# Patient Record
Sex: MALE | Race: BLACK OR AFRICAN AMERICAN | Employment: OTHER | URBAN - METROPOLITAN AREA
[De-identification: names, ages, dates, MRNs, and addresses within clinical notes are randomized per-mention and may not be internally consistent; named-entity substitution may affect disease eponyms.]

---

## 2019-04-23 ENCOUNTER — APPOINTMENT (OUTPATIENT)
Dept: GENERAL RADIOLOGY | Age: 59
End: 2019-04-23
Payer: COMMERCIAL

## 2019-04-23 ENCOUNTER — HOSPITAL ENCOUNTER (EMERGENCY)
Age: 59
Discharge: HOME | End: 2019-04-23
Payer: COMMERCIAL

## 2019-04-23 VITALS
TEMPERATURE: 99.1 F | HEART RATE: 76 BPM | SYSTOLIC BLOOD PRESSURE: 151 MMHG | OXYGEN SATURATION: 99 % | RESPIRATION RATE: 16 BRPM | DIASTOLIC BLOOD PRESSURE: 89 MMHG

## 2019-04-23 DIAGNOSIS — S93.402A SPRAIN OF LEFT ANKLE, UNSPECIFIED LIGAMENT, INITIAL ENCOUNTER: ICD-10-CM

## 2019-04-23 DIAGNOSIS — S92.415A CLOSED NONDISPLACED FRACTURE OF PROXIMAL PHALANX OF LEFT GREAT TOE, INITIAL ENCOUNTER: Primary | ICD-10-CM

## 2019-04-23 PROCEDURE — 99283 EMERGENCY DEPT VISIT LOW MDM: CPT

## 2019-04-23 PROCEDURE — 6370000000 HC RX 637 (ALT 250 FOR IP): Performed by: PHYSICIAN ASSISTANT

## 2019-04-23 PROCEDURE — 73610 X-RAY EXAM OF ANKLE: CPT

## 2019-04-23 PROCEDURE — 73630 X-RAY EXAM OF FOOT: CPT

## 2019-04-23 RX ORDER — OXYCODONE HYDROCHLORIDE AND ACETAMINOPHEN 5; 325 MG/1; MG/1
1 TABLET ORAL ONCE
Status: COMPLETED | OUTPATIENT
Start: 2019-04-23 | End: 2019-04-23

## 2019-04-23 RX ORDER — NAPROXEN 500 MG/1
500 TABLET ORAL 2 TIMES DAILY WITH MEALS
Qty: 20 TABLET | Refills: 0 | Status: SHIPPED | OUTPATIENT
Start: 2019-04-23 | End: 2020-06-04

## 2019-04-23 RX ORDER — HYDROCODONE BITARTRATE AND ACETAMINOPHEN 5; 325 MG/1; MG/1
1 TABLET ORAL EVERY 6 HOURS PRN
Qty: 10 TABLET | Refills: 0 | Status: SHIPPED | OUTPATIENT
Start: 2019-04-23 | End: 2019-04-26

## 2019-04-23 RX ADMIN — OXYCODONE AND ACETAMINOPHEN 1 TABLET: 5; 325 TABLET ORAL at 15:27

## 2019-04-23 SDOH — HEALTH STABILITY: MENTAL HEALTH: HOW OFTEN DO YOU HAVE A DRINK CONTAINING ALCOHOL?: NEVER

## 2019-04-23 ASSESSMENT — PAIN SCALES - GENERAL
PAINLEVEL_OUTOF10: 8
PAINLEVEL_OUTOF10: 8

## 2019-04-23 ASSESSMENT — ENCOUNTER SYMPTOMS
EYE REDNESS: 0
EYE DISCHARGE: 0
APNEA: 0
NAUSEA: 0
ABDOMINAL PAIN: 0
CHOKING: 0
FACIAL SWELLING: 0
SHORTNESS OF BREATH: 0
BACK PAIN: 0
VOMITING: 0

## 2019-04-23 ASSESSMENT — PAIN DESCRIPTION - PROGRESSION: CLINICAL_PROGRESSION: GRADUALLY WORSENING

## 2019-04-23 ASSESSMENT — PAIN DESCRIPTION - DESCRIPTORS: DESCRIPTORS: THROBBING

## 2019-04-23 ASSESSMENT — PAIN DESCRIPTION - LOCATION: LOCATION: TOE (COMMENT WHICH ONE)

## 2019-04-23 ASSESSMENT — PAIN DESCRIPTION - ORIENTATION: ORIENTATION: LEFT

## 2019-04-23 ASSESSMENT — PAIN DESCRIPTION - PAIN TYPE: TYPE: ACUTE PAIN

## 2019-04-23 ASSESSMENT — PAIN DESCRIPTION - FREQUENCY: FREQUENCY: CONTINUOUS

## 2019-04-23 ASSESSMENT — PAIN - FUNCTIONAL ASSESSMENT: PAIN_FUNCTIONAL_ASSESSMENT: PREVENTS OR INTERFERES WITH MANY ACTIVE NOT PASSIVE ACTIVITIES

## 2019-04-23 ASSESSMENT — PAIN DESCRIPTION - ONSET: ONSET: SUDDEN

## 2019-04-23 NOTE — LETTER
[facility] Emergency Department  [address] [place] [id number]  Phone: [phone]             April 23, 2019    Patient: Tamika Lloyd Sr. YOB: 1960   Date of Visit: 4/23/2019       To Whom It May Concern:    Tamika Lloyd was seen and treated in our emergency department on 4/23/2019. He may return to work on 4/29/19.       Sincerely,             Signature:__________________________________

## 2019-04-23 NOTE — ED PROVIDER NOTES
**EVALUATED BY ADVANCED PRACTICE PROVIDER**        [facility]  eMERGENCY dEPARTMENT eNCOUnter      Pt Name: Hector Graham Sr. XJI:[id number]  Birthdate 1960  Date of evaluation: 4/23/2019  Provider: Ronit Wynn PA-C      Chief Complaint:    Chief Complaint   Patient presents with    Foot Injury     pt reports he fell on the steps on sunday chasing the grandkids. denies head injury. left foot and toes are bruised and swollen. Nursing Notes, Past Medical Hx, Past Surgical Hx, Social Hx, Allergies, and Family Hx were all reviewed and agreed with or any disagreements were addressed in the HPI.    HPI:  (Location, Duration, Timing, Severity,Quality, Assoc Sx, Context, Modifying factors)  This is a  61 y.o. male who complain of injury to his left ankle and foot. Patient stated on Sunday he fell down steps and hit his left foot on the step. Discussed swelling noted around the great toe and the lateral side of the left foot and ankle. Denies numbness or tingling. Can barely put any weight on it has been walking on his heel. Denies knee pain, no hip pain, no other extremity injury. No loss of consciousness. Denies chest pain, no abdominal pain. No other complaints. PastMedical/Surgical History:  History reviewed. No pertinent past medical history. History reviewed. No pertinent surgical history. Medications:  Previous Medications    No medications on file         Review of Systems:  Review of Systems   Constitutional: Negative for chills and fever. HENT: Negative for congestion, facial swelling and sneezing. Eyes: Negative for discharge and redness. Respiratory: Negative for apnea, choking and shortness of breath. Cardiovascular: Negative for chest pain. Gastrointestinal: Negative for abdominal pain, nausea and vomiting. Genitourinary: Negative for dysuria. Musculoskeletal: Negative for back pain, neck pain and neck stiffness. Neurological: Negative for dizziness, tremors, seizures and headaches. All other systems reviewed and are negative. Positives and Pertinent negatives as per HPI. Except as noted above in the ROS, problem specific ROS was completed and is negative. Physical Exam:  Physical Exam   Constitutional: He is oriented to person, place, and time. He appears well-developed and well-nourished. HENT:   Head: Normocephalic and atraumatic. Nose: Nose normal.   Eyes: Right eye exhibits no discharge. Left eye exhibits no discharge. Neck: Normal range of motion. Neck supple. No spinous process tenderness and no muscular tenderness present. Cardiovascular: Normal rate, regular rhythm and normal heart sounds. Exam reveals no gallop and no friction rub. No murmur heard. Pulmonary/Chest: Effort normal and breath sounds normal. No respiratory distress. He has no wheezes. He has no rales. He exhibits no tenderness. Musculoskeletal:        Left ankle: He exhibits decreased range of motion and swelling. He exhibits no deformity. Tenderness. Lateral malleolus, medial malleolus and head of 5th metatarsal tenderness found. No proximal fibula tenderness found. Achilles tendon normal.        Feet:    Neurological: He is alert and oriented to person, place, and time. Skin: Skin is warm and dry. He is not diaphoretic. Psychiatric: He has a normal mood and affect. His behavior is normal.   Nursing note and vitals reviewed. MEDICAL DECISION MAKING    Vitals:    Vitals:    04/23/19 1259   BP: (!) 151/89   Pulse: 76   Resp: 16   Temp: 99.1 °F (37.3 °C)   SpO2: 99%       LABS:Labs Reviewed - No data to display     Remainder of labs reviewed and werenegative at this time or not returned at the time of this note.     RADIOLOGY:   Non-plain film images such as CT, Ultrasound and MRI are read by the radiologist. Bruno Haile PA-C have directly visualized the radiologic plain film image(s) with the below findings:        Interpretation per the Radiologist below, if available at the time of thisnote:    XR FOOT LEFT (MIN 3 VIEWS)   Final Result   On the AP view, there is a subtle lucency through the shaft of the proximal   phalanx of the great toe concerning for nondisplaced fracture. 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 There is associated soft tissue swelling. The degenerative changes involve   the joint somewhat more pronounced laterally. If there are clinical signs   and/or symptoms of infection, a septic joint with bony destruction is not   entirely excluded and should be considered. In that event, MRI could be   performed for further evaluation. XR ANKLE LEFT (MIN 3 VIEWS)   Final Result   On the AP view, there is a subtle lucency through the shaft of the proximal   phalanx of the great toe concerning for nondisplaced fracture. 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 There is associated soft tissue swelling. The degenerative changes involve   the joint somewhat more pronounced laterally. If there are clinical signs   and/or symptoms of infection, a septic joint with bony destruction is not   entirely excluded and should be considered. In that event, MRI could be   performed for further evaluation. No results found. MEDICAL DECISION MAKING / ED COURSE:      PROCEDURES:   Procedures    None    Patient was given:     Medications   oxyCODONE-acetaminophen (PERCOCET) 5-325 MG per tablet 1 tablet (has no administration in time range)         Emergency room course: Patient on exam shows mild tenderness along the lateral aspect of the fifth metatarsal particular clear the base. Mild tenderness to the ankle medial lateral side with no obvious deformities slight swelling noted. Swelling and tenderness noted over the great toe to clean the head of the first metatarsal extending through the toe.  Pedal pulse, 2+ capillary refill less than 2 seconds all digits. Plantar surface of foot show no tenderness Achilles so no tenderness. Pedal pulse good at 2+      X-ray of the left ankle left foot shows lucency through the shaft of the proximal phalanx of the great toe. Concern for nondisplaced fracture. At this time I did discuss patient discharge plan with him. I'll give him Percocet 5 mg by mouth here in emergency room. Put him on an anti-inflammatory and a few Norco for home. He is to ice elevate. Follow orthopedics and return if worsens. He understood discharge plan he'll be discharged stable condition. The patient tolerated their visit well. I evaluated the patient. The physician was available for consultation as needed. The patient and / or the family were informed of the results of anytests, a time was given to answer questions, a plan was proposed and they agreed with plan. CLINICAL IMPRESSION:  1. Closed nondisplaced fracture of proximal phalanx of left great toe, initial encounter    2. Sprain of left ankle, unspecified ligament, initial encounter        DISPOSITION  DISPOSITION Decision To Discharge 04/23/2019 03:14:41 PM          PATIENT REFERRED TO:  Anne Celis [address], [address]  [phone]    Call in 1 week        DISCHARGE MEDICATIONS:  New Prescriptions    HYDROCODONE-ACETAMINOPHEN (NORCO) 5-325 MG PER TABLET    Take 1 tablet by mouth every 6 hours as needed for Pain for up to 3 days.     NAPROXEN (NAPROSYN) 500 MG TABLET    Take 1 tablet by mouth 2 times daily (with meals)       DISCONTINUED MEDICATIONS:  Discontinued Medications    No medications on file              (Please note the MDM and HPI sections of this note were completed with a voice recognition program.  Efforts weremade to edit the dictations but occasionally words are mis-transcribed.)    Electronically signed, Jackson Goldberg, PA-C,          Jackson Goldberg, PA-C  04/29/19 0658

## 2020-06-04 ENCOUNTER — APPOINTMENT (OUTPATIENT)
Dept: GENERAL RADIOLOGY | Age: 60
DRG: 313 | End: 2020-06-04
Payer: COMMERCIAL

## 2020-06-04 ENCOUNTER — HOSPITAL ENCOUNTER (INPATIENT)
Age: 60
LOS: 1 days | Discharge: HOME | DRG: 313 | End: 2020-06-05
Attending: EMERGENCY MEDICINE | Admitting: FAMILY MEDICINE
Payer: COMMERCIAL

## 2020-06-04 ENCOUNTER — APPOINTMENT (OUTPATIENT)
Dept: CT IMAGING | Age: 60
DRG: 313 | End: 2020-06-04
Payer: COMMERCIAL

## 2020-06-04 PROBLEM — E04.9 GOITER: Status: ACTIVE | Noted: 2020-06-04

## 2020-06-04 LAB
ANION GAP SERPL CALCULATED.3IONS-SCNC: 13 MMOL/L (ref 3–16)
APTT: 33.4 SEC (ref 24.2–36.2)
BASOPHILS ABSOLUTE: 0 K/UL (ref 0–0.2)
BASOPHILS RELATIVE PERCENT: 0 %
BUN BLDV-MCNC: 7 MG/DL (ref 7–20)
CALCIUM SERPL-MCNC: 8.6 MG/DL (ref 8.3–10.6)
CHLORIDE BLD-SCNC: 104 MMOL/L (ref 99–110)
CO2: 22 MMOL/L (ref 21–32)
CREAT SERPL-MCNC: 0.8 MG/DL (ref 0.8–1.3)
EKG ATRIAL RATE: 67 BPM
EKG ATRIAL RATE: 74 BPM
EKG DIAGNOSIS: NORMAL
EKG DIAGNOSIS: NORMAL
EKG P AXIS: 71 DEGREES
EKG P AXIS: 74 DEGREES
EKG P-R INTERVAL: 132 MS
EKG P-R INTERVAL: 134 MS
EKG Q-T INTERVAL: 416 MS
EKG Q-T INTERVAL: 422 MS
EKG QRS DURATION: 82 MS
EKG QRS DURATION: 98 MS
EKG QTC CALCULATION (BAZETT): 445 MS
EKG QTC CALCULATION (BAZETT): 461 MS
EKG R AXIS: 23 DEGREES
EKG R AXIS: 5 DEGREES
EKG T AXIS: 62 DEGREES
EKG T AXIS: 76 DEGREES
EKG VENTRICULAR RATE: 67 BPM
EKG VENTRICULAR RATE: 74 BPM
EOSINOPHILS ABSOLUTE: 0.3 K/UL (ref 0–0.6)
EOSINOPHILS RELATIVE PERCENT: 10 %
GFR AFRICAN AMERICAN: >60
GFR NON-AFRICAN AMERICAN: >60
GLUCOSE BLD-MCNC: 112 MG/DL (ref 70–99)
HCT VFR BLD CALC: 39.3 % (ref 40.5–52.5)
HEMOGLOBIN: 13.5 G/DL (ref 13.5–17.5)
INR BLD: 0.95 (ref 0.86–1.14)
LYMPHOCYTES ABSOLUTE: 1.3 K/UL (ref 1–5.1)
LYMPHOCYTES RELATIVE PERCENT: 39 %
MACROCYTES: ABNORMAL
MAGNESIUM: 1.9 MG/DL (ref 1.8–2.4)
MCH RBC QN AUTO: 34.5 PG (ref 26–34)
MCHC RBC AUTO-ENTMCNC: 34.3 G/DL (ref 31–36)
MCV RBC AUTO: 100.4 FL (ref 80–100)
MONOCYTES ABSOLUTE: 0.3 K/UL (ref 0–1.3)
MONOCYTES RELATIVE PERCENT: 10 %
NEUTROPHILS ABSOLUTE: 1.4 K/UL (ref 1.7–7.7)
NEUTROPHILS RELATIVE PERCENT: 41 %
PDW BLD-RTO: 14.4 % (ref 12.4–15.4)
PLATELET # BLD: 244 K/UL (ref 135–450)
PLATELET SLIDE REVIEW: ADEQUATE
PMV BLD AUTO: 7.5 FL (ref 5–10.5)
POTASSIUM REFLEX MAGNESIUM: 3.5 MMOL/L (ref 3.5–5.1)
PRO-BNP: 9 PG/ML (ref 0–124)
PROTHROMBIN TIME: 11 SEC (ref 10–13.2)
RBC # BLD: 3.91 M/UL (ref 4.2–5.9)
SLIDE REVIEW: ABNORMAL
SODIUM BLD-SCNC: 139 MMOL/L (ref 136–145)
TOTAL CK: 500 U/L (ref 39–308)
TROPONIN: 0.02 NG/ML
TROPONIN: <0.01 NG/ML
TSH REFLEX: 0.78 UIU/ML (ref 0.27–4.2)
WBC # BLD: 3.3 K/UL (ref 4–11)

## 2020-06-04 PROCEDURE — 99255 IP/OBS CONSLTJ NEW/EST HI 80: CPT | Performed by: INTERNAL MEDICINE

## 2020-06-04 PROCEDURE — 93010 ELECTROCARDIOGRAM REPORT: CPT | Performed by: INTERNAL MEDICINE

## 2020-06-04 PROCEDURE — 6370000000 HC RX 637 (ALT 250 FOR IP): Performed by: INTERNAL MEDICINE

## 2020-06-04 PROCEDURE — 85610 PROTHROMBIN TIME: CPT

## 2020-06-04 PROCEDURE — 99253 IP/OBS CNSLTJ NEW/EST LOW 45: CPT | Performed by: SURGERY

## 2020-06-04 PROCEDURE — 80048 BASIC METABOLIC PNL TOTAL CA: CPT

## 2020-06-04 PROCEDURE — 71045 X-RAY EXAM CHEST 1 VIEW: CPT

## 2020-06-04 PROCEDURE — 2580000003 HC RX 258: Performed by: FAMILY MEDICINE

## 2020-06-04 PROCEDURE — 36415 COLL VENOUS BLD VENIPUNCTURE: CPT

## 2020-06-04 PROCEDURE — 85025 COMPLETE CBC W/AUTO DIFF WBC: CPT

## 2020-06-04 PROCEDURE — 85730 THROMBOPLASTIN TIME PARTIAL: CPT

## 2020-06-04 PROCEDURE — 93005 ELECTROCARDIOGRAM TRACING: CPT | Performed by: NURSE PRACTITIONER

## 2020-06-04 PROCEDURE — 82550 ASSAY OF CK (CPK): CPT

## 2020-06-04 PROCEDURE — 73590 X-RAY EXAM OF LOWER LEG: CPT

## 2020-06-04 PROCEDURE — 93971 EXTREMITY STUDY: CPT

## 2020-06-04 PROCEDURE — 93005 ELECTROCARDIOGRAM TRACING: CPT | Performed by: EMERGENCY MEDICINE

## 2020-06-04 PROCEDURE — 99285 EMERGENCY DEPT VISIT HI MDM: CPT

## 2020-06-04 PROCEDURE — 83880 ASSAY OF NATRIURETIC PEPTIDE: CPT

## 2020-06-04 PROCEDURE — 6370000000 HC RX 637 (ALT 250 FOR IP): Performed by: FAMILY MEDICINE

## 2020-06-04 PROCEDURE — 83735 ASSAY OF MAGNESIUM: CPT

## 2020-06-04 PROCEDURE — 84443 ASSAY THYROID STIM HORMONE: CPT

## 2020-06-04 PROCEDURE — 71250 CT THORAX DX C-: CPT

## 2020-06-04 PROCEDURE — 2060000000 HC ICU INTERMEDIATE R&B

## 2020-06-04 PROCEDURE — 84484 ASSAY OF TROPONIN QUANT: CPT

## 2020-06-04 PROCEDURE — 73610 X-RAY EXAM OF ANKLE: CPT

## 2020-06-04 RX ORDER — POLYETHYLENE GLYCOL 3350 17 G/17G
17 POWDER, FOR SOLUTION ORAL DAILY PRN
Status: DISCONTINUED | OUTPATIENT
Start: 2020-06-04 | End: 2020-06-05 | Stop reason: HOSPADM

## 2020-06-04 RX ORDER — AMLODIPINE BESYLATE 5 MG/1
5 TABLET ORAL DAILY
Status: DISCONTINUED | OUTPATIENT
Start: 2020-06-04 | End: 2020-06-05

## 2020-06-04 RX ORDER — ACETAMINOPHEN 325 MG/1
650 TABLET ORAL EVERY 6 HOURS PRN
Status: DISCONTINUED | OUTPATIENT
Start: 2020-06-04 | End: 2020-06-05 | Stop reason: HOSPADM

## 2020-06-04 RX ORDER — SODIUM CHLORIDE 0.9 % (FLUSH) 0.9 %
10 SYRINGE (ML) INJECTION PRN
Status: DISCONTINUED | OUTPATIENT
Start: 2020-06-04 | End: 2020-06-05 | Stop reason: HOSPADM

## 2020-06-04 RX ORDER — ACETAMINOPHEN 650 MG/1
650 SUPPOSITORY RECTAL EVERY 6 HOURS PRN
Status: DISCONTINUED | OUTPATIENT
Start: 2020-06-04 | End: 2020-06-05 | Stop reason: HOSPADM

## 2020-06-04 RX ORDER — SODIUM CHLORIDE 0.9 % (FLUSH) 0.9 %
10 SYRINGE (ML) INJECTION EVERY 12 HOURS SCHEDULED
Status: DISCONTINUED | OUTPATIENT
Start: 2020-06-04 | End: 2020-06-05 | Stop reason: HOSPADM

## 2020-06-04 RX ORDER — ONDANSETRON 2 MG/ML
4 INJECTION INTRAMUSCULAR; INTRAVENOUS EVERY 6 HOURS PRN
Status: DISCONTINUED | OUTPATIENT
Start: 2020-06-04 | End: 2020-06-05 | Stop reason: HOSPADM

## 2020-06-04 RX ORDER — PROMETHAZINE HYDROCHLORIDE 25 MG/1
12.5 TABLET ORAL EVERY 6 HOURS PRN
Status: DISCONTINUED | OUTPATIENT
Start: 2020-06-04 | End: 2020-06-05 | Stop reason: HOSPADM

## 2020-06-04 RX ADMIN — SODIUM CHLORIDE, PRESERVATIVE FREE 10 ML: 5 INJECTION INTRAVENOUS at 20:10

## 2020-06-04 RX ADMIN — ACETAMINOPHEN 650 MG: 325 TABLET ORAL at 16:46

## 2020-06-04 RX ADMIN — AMLODIPINE BESYLATE 5 MG: 5 TABLET ORAL at 16:46

## 2020-06-04 ASSESSMENT — PAIN DESCRIPTION - FREQUENCY
FREQUENCY: CONTINUOUS
FREQUENCY: CONTINUOUS

## 2020-06-04 ASSESSMENT — ENCOUNTER SYMPTOMS
CHEST TIGHTNESS: 0
COUGH: 0
ABDOMINAL PAIN: 0
DIARRHEA: 0
SHORTNESS OF BREATH: 1
VOMITING: 0
NAUSEA: 0
COLOR CHANGE: 0

## 2020-06-04 ASSESSMENT — PAIN DESCRIPTION - DESCRIPTORS
DESCRIPTORS: THROBBING
DESCRIPTORS: THROBBING

## 2020-06-04 ASSESSMENT — PAIN SCALES - GENERAL
PAINLEVEL_OUTOF10: 5
PAINLEVEL_OUTOF10: 8
PAINLEVEL_OUTOF10: 0
PAINLEVEL_OUTOF10: 8

## 2020-06-04 ASSESSMENT — PAIN - FUNCTIONAL ASSESSMENT
PAIN_FUNCTIONAL_ASSESSMENT: ACTIVITIES ARE NOT PREVENTED
PAIN_FUNCTIONAL_ASSESSMENT: ACTIVITIES ARE NOT PREVENTED

## 2020-06-04 ASSESSMENT — PAIN DESCRIPTION - ONSET
ONSET: PROGRESSIVE
ONSET: PROGRESSIVE

## 2020-06-04 ASSESSMENT — PAIN DESCRIPTION - PROGRESSION
CLINICAL_PROGRESSION: GRADUALLY WORSENING
CLINICAL_PROGRESSION: GRADUALLY WORSENING

## 2020-06-04 ASSESSMENT — PAIN DESCRIPTION - LOCATION
LOCATION: LEG
LOCATION: LEG

## 2020-06-04 ASSESSMENT — PAIN DESCRIPTION - ORIENTATION
ORIENTATION: LEFT
ORIENTATION: LEFT

## 2020-06-04 ASSESSMENT — HEART SCORE: ECG: 0

## 2020-06-04 ASSESSMENT — PAIN DESCRIPTION - PAIN TYPE
TYPE: ACUTE PAIN
TYPE: ACUTE PAIN

## 2020-06-04 NOTE — ED TRIAGE NOTES
Pt arrived to ED via private vehicle with complaints of left leg swelling. On initial assessment, pt states it started 2 days ago associated with left calf pain that is throbbing. Pt denies injury. VS noted and stable. Patient A&Ox4. Respirations easy and unlabored. Skin warm and dry and appropriate for ethnicity. No acute distress noted at this time.

## 2020-06-04 NOTE — ED PROVIDER NOTES
dysuria. Musculoskeletal:        Left lower extremity pain     Skin: Negative. Negative for color change, pallor, rash and wound. Allergic/Immunologic: Negative for immunocompromised state. Neurological: Negative. Psychiatric/Behavioral: Negative. All other systems reviewed and are negative. Positives and Pertinent negatives as per HPI. Except as noted above in the ROS, all other systems were reviewed and negative. PAST MEDICAL HISTORY   History reviewed. No pertinent past medical history. SURGICAL HISTORY   History reviewed. No pertinent surgical history. CURRENTMEDICATIONS       Previous Medications    No medications on file         ALLERGIES     Patient has no known allergies. FAMILYHISTORY     History reviewed. No pertinent family history. SOCIAL HISTORY       Social History     Tobacco Use    Smoking status: Current Every Day Smoker     Packs/day: 0.50     Types: Cigarettes    Smokeless tobacco: Never Used   Substance Use Topics    Alcohol use: Never     Frequency: Never    Drug use: Never       SCREENINGS             PHYSICAL EXAM    (up to 7 for level 4, 8 or more for level 5)     ED Triage Vitals [06/04/20 0916]   BP Temp Temp Source Pulse Resp SpO2 Height Weight   (!) 157/75 97.8 °F (36.6 °C) Oral 85 15 97 % 5' 7\" (1.702 m) 153 lb 14.1 oz (69.8 kg)       Physical Exam  Vitals signs and nursing note reviewed. Constitutional:       General: He is not in acute distress. Appearance: Normal appearance. He is well-developed. He is not ill-appearing, toxic-appearing or diaphoretic. Interventions: He is not intubated. HENT:      Head: Normocephalic and atraumatic. Eyes:      General: Lids are normal.         Right eye: No discharge. Left eye: No discharge. Conjunctiva/sclera: Conjunctivae normal.   Neck:      Musculoskeletal: Full passive range of motion without pain, normal range of motion and neck supple. Vascular: No JVD. resulting in tracheal   displacement to the right. 3. Severe coronary artery disease. XR TIBIA FIBULA LEFT (2 VIEWS)   Final Result   No acute osseous abnormality of the left tibia/fibula or left ankle. XR CHEST PORTABLE   Final Result   1. No acute cardiopulmonary disease. 2. Tracheal deviation to the right. This is nonspecific, although, a   superior mediastinal mass/lymphadenopathy or substernal goiter are   considerations. Further evaluation with chest CT is recommended. XR ANKLE LEFT (MIN 3 VIEWS)   Final Result   No acute osseous abnormality of the left tibia/fibula or left ankle. Xr Tibia Fibula Left (2 Views)    Result Date: 6/4/2020  EXAMINATION: 2 XRAY VIEWS OF THE LEFT TIBIA AND FIBULA; THREE XRAY VIEWS OF THE LEFT ANKLE 6/4/2020 9:27 am COMPARISON: 04/23/2019 HISTORY: ORDERING SYSTEM PROVIDED HISTORY: pain TECHNOLOGIST PROVIDED HISTORY: Reason for exam:->pain Reason for Exam: leg swelling Acuity: Acute Type of Exam: Initial FINDINGS: No acute fracture or dislocation is demonstrated. The ankle mortise appears intact. There is soft tissue swelling of the left ankle. No acute osseous abnormality of the left tibia/fibula or left ankle. Xr Ankle Left (min 3 Views)    Result Date: 6/4/2020  EXAMINATION: 2 XRAY VIEWS OF THE LEFT TIBIA AND FIBULA; THREE XRAY VIEWS OF THE LEFT ANKLE 6/4/2020 9:27 am COMPARISON: 04/23/2019 HISTORY: ORDERING SYSTEM PROVIDED HISTORY: pain TECHNOLOGIST PROVIDED HISTORY: Reason for exam:->pain Reason for Exam: leg swelling Acuity: Acute Type of Exam: Initial FINDINGS: No acute fracture or dislocation is demonstrated. The ankle mortise appears intact. There is soft tissue swelling of the left ankle. No acute osseous abnormality of the left tibia/fibula or left ankle.      Ct Chest Wo Contrast    Result Date: 6/4/2020  EXAMINATION: CT OF THE CHEST WITHOUT CONTRAST 6/4/2020 10:09 am TECHNIQUE: CT of the chest was performed without display    MDM: Patient seen and evaluated per myself in conjunction with ED attending Dr. Romi Hooks. See HPI and above for full presentation and physical exam.  Patient is a very pleasant 80-year-old male that presents the ED today with not only left lower extremity edema distal to the left knee but also chest pain and shortness of breath with exertion. See HPI and above for full presentation physical exam.  He is afebrile and hemodynamically stable nontoxic in appearance. No acute respiratory distress. Differential diagnoses include dependent edema, lymphedema, compartment syndrome, musculoskeletal injury, ligamental injury, bony fracture, DVT, PE, ACS, arrhythmia, right heart strain, other    Patient's white blood cell count 3.3. No anemia. No severe electrolyte derangements or NICOLE. No coagulopathy. No ST changes on EKG, officially interpreted per my attending. Troponin is slightly elevated 0.02. BNP 9. Chest x-ray is read by the radiologist as above. Given the tracheal shift I did order a CT of the chest without IV contrast.  This is showing the tracheal shift due to a large goiter, TSH added on:  0.78. Ultrasound of the left lower extremity does not show any evidence of DVT, the dental finding of left groin enlarged lymph nodes. Given the patient's elevated troponin with his dyspnea and chest pain on exertion I do believe he needs to be admitted for further evaluation and treatment. Therefore consulted the hospitalist who agreed to admit patient and write orders for admission. FINAL IMPRESSION      1. Chest pain, unspecified type    2. Shortness of breath    3. Left leg swelling          DISPOSITION/PLAN   DISPOSITION Admitted 06/04/2020 12:15:30 PM      PATIENT REFERREDTO:  No follow-up provider specified.     DISCHARGE MEDICATIONS:  New Prescriptions    No medications on file       DISCONTINUED MEDICATIONS:  Discontinued Medications    NAPROXEN (NAPROSYN) 500 MG TABLET    Take 1 tablet by mouth 2 times daily (with meals)              (Please note that portions of this note were completed with a voice recognition program.  Efforts were made to edit the dictations but occasionally words are mis-transcribed.)    JAMMIE Hampton CNP (electronically signed)            JAMMIE Hampton CNP  06/04/20 8006

## 2020-06-04 NOTE — ED PROVIDER NOTES
Attending Supervising Physicians Attestation Statement  I was present with the Mid Level Provider during the history and exam. I discussed the findings and plans with the Mid Level Provider and agree as documented in her note     61 yr old presents with exertional SOB. Also, endorses left ankle swelling. EKG shows NSR NAD no stemi    Vitals:    06/04/20 0916 06/04/20 1001 06/04/20 1203 06/04/20 1316   BP: (!) 157/75 (!) 141/74 (!) 163/96 (!) 162/83   Pulse: 85   81   Resp: 15   18   Temp: 97.8 °F (36.6 °C)   97.6 °F (36.4 °C)   TempSrc: Oral      SpO2: 97%  98% 98%   Weight: 153 lb 14.1 oz (69.8 kg)      Height: 5' 7\" (1.702 m)        61 yr old with leg swelling. No DVT on US. Trop elevated. Will admit for further inpatient evaluation. Impression  Elevated troponin  SOB    Total Critical Care time was 38 minutes, excluding separately reportable procedures. There was a high probability of clinically significant/life threatening deterioration in the patient's condition which required my urgent intervention.           Electronically signed by Jaquan Sahu MD on 6/4/20 at 1:34 PM EDT            Jaquan Sahu MD  06/05/20 2618

## 2020-06-04 NOTE — CARE COORDINATION
DISCHARGE PLAN: Pt plans to return to [facility] upon d/c. Pt will drive himself there at d/c.  ________________________________________________    Met w/pt to address barriers to dc. HOME: pt reported that he has been staying at [facility] [address]. Pt stated that he and his significant other had a falling and therefore he has been staying at the [facility]. DME/O2: Pt reported that he has no DME at home and does not feel that he will need any DME at the time of d/c. ACTIVE SERVICES: Pt stated that he was working full time at [employer] and was independent with all self care. Pt denied the need for any assistance at the time of d/c. TRANSPORTATION: Pt reported that he is an active  and plans to drive home at the time of d/c. PHARMACY: Pt reported that he was not taking any prescribed medications PTA as he has not been to a PCP in 2 years. Pt stated that is he has to have medications upon d/c, he would prefer that they are sent to a pharmacy on [address]. Pt stated that he does not have a preference, that it [facility] be [facility], [facility] or [facility]. PCP: Loni Chicas [phone]  Pt stated that he has not been to the doctor in at least two years. SW requested to schedule a f/u appointment for pt. Pt declined. DEMOGRAPHICS: verified address/phone number as correct-Currently staying at [facility]. INSURANCE:  [employer]    HD/PD: No    THERAPY RECS  Not ordered    Discharge planning team will remain available for needs. Please consult for any specifics not addressed in this note.     Ronan Verduzco  [phone]  Electronically signed by Nima Etienne on 6/4/2020 at 4:24 PM

## 2020-06-04 NOTE — ED NOTES
Pharmacy Medication Reconciliation Note     List of medications patient is currently taking is complete. Source of information:   1. patient via nurse    Notes regarding home medications:   1.  Denies taking any prescription, OTC or herbal medications    Yessi Lara PharmD, BCPS  6/4/2020  11:24 AM

## 2020-06-05 ENCOUNTER — APPOINTMENT (OUTPATIENT)
Dept: ULTRASOUND IMAGING | Age: 60
DRG: 313 | End: 2020-06-05
Payer: COMMERCIAL

## 2020-06-05 VITALS
DIASTOLIC BLOOD PRESSURE: 99 MMHG | HEART RATE: 70 BPM | RESPIRATION RATE: 14 BRPM | BODY MASS INDEX: 23.01 KG/M2 | HEIGHT: 67 IN | WEIGHT: 146.61 LBS | OXYGEN SATURATION: 98 % | TEMPERATURE: 98.5 F | SYSTOLIC BLOOD PRESSURE: 154 MMHG

## 2020-06-05 LAB
ANION GAP SERPL CALCULATED.3IONS-SCNC: 11 MMOL/L (ref 3–16)
BASOPHILS ABSOLUTE: 0 K/UL (ref 0–0.2)
BASOPHILS RELATIVE PERCENT: 0.9 %
BUN BLDV-MCNC: 9 MG/DL (ref 7–20)
CALCIUM SERPL-MCNC: 8.7 MG/DL (ref 8.3–10.6)
CHLORIDE BLD-SCNC: 101 MMOL/L (ref 99–110)
CO2: 25 MMOL/L (ref 21–32)
CREAT SERPL-MCNC: 0.8 MG/DL (ref 0.8–1.3)
EKG ATRIAL RATE: 63 BPM
EKG DIAGNOSIS: NORMAL
EKG P AXIS: 78 DEGREES
EKG P-R INTERVAL: 136 MS
EKG Q-T INTERVAL: 434 MS
EKG QRS DURATION: 96 MS
EKG QTC CALCULATION (BAZETT): 444 MS
EKG R AXIS: 20 DEGREES
EKG T AXIS: 62 DEGREES
EKG VENTRICULAR RATE: 63 BPM
EOSINOPHILS ABSOLUTE: 0.2 K/UL (ref 0–0.6)
EOSINOPHILS RELATIVE PERCENT: 5.3 %
GFR AFRICAN AMERICAN: >60
GFR NON-AFRICAN AMERICAN: >60
GLUCOSE BLD-MCNC: 97 MG/DL (ref 70–99)
HCT VFR BLD CALC: 41 % (ref 40.5–52.5)
HEMOGLOBIN: 13.8 G/DL (ref 13.5–17.5)
LACTIC ACID: 0.9 MMOL/L (ref 0.4–2)
LV EF: 58 %
LV EF: 67 %
LVEF MODALITY: NORMAL
LVEF MODALITY: NORMAL
LYMPHOCYTES ABSOLUTE: 1.4 K/UL (ref 1–5.1)
LYMPHOCYTES RELATIVE PERCENT: 39.4 %
MACROCYTES: ABNORMAL
MCH RBC QN AUTO: 33.9 PG (ref 26–34)
MCHC RBC AUTO-ENTMCNC: 33.7 G/DL (ref 31–36)
MCV RBC AUTO: 100.5 FL (ref 80–100)
MONOCYTES ABSOLUTE: 0.7 K/UL (ref 0–1.3)
MONOCYTES RELATIVE PERCENT: 19.2 %
NEUTROPHILS ABSOLUTE: 1.2 K/UL (ref 1.7–7.7)
NEUTROPHILS RELATIVE PERCENT: 35.2 %
PDW BLD-RTO: 14.3 % (ref 12.4–15.4)
PLATELET # BLD: 239 K/UL (ref 135–450)
PLATELET SLIDE REVIEW: ADEQUATE
PMV BLD AUTO: 8 FL (ref 5–10.5)
POTASSIUM REFLEX MAGNESIUM: 3.7 MMOL/L (ref 3.5–5.1)
RBC # BLD: 4.08 M/UL (ref 4.2–5.9)
SODIUM BLD-SCNC: 137 MMOL/L (ref 136–145)
THYROID PEROXIDASE (TPO) ABS: 13 IU/ML
TOTAL CK: 275 U/L (ref 39–308)
TROPONIN: <0.01 NG/ML
TROPONIN: <0.01 NG/ML
WBC # BLD: 3.5 K/UL (ref 4–11)

## 2020-06-05 PROCEDURE — 2580000003 HC RX 258: Performed by: FAMILY MEDICINE

## 2020-06-05 PROCEDURE — 99233 SBSQ HOSP IP/OBS HIGH 50: CPT | Performed by: INTERNAL MEDICINE

## 2020-06-05 PROCEDURE — 93306 TTE W/DOPPLER COMPLETE: CPT

## 2020-06-05 PROCEDURE — 78452 HT MUSCLE IMAGE SPECT MULT: CPT

## 2020-06-05 PROCEDURE — A9502 TC99M TETROFOSMIN: HCPCS | Performed by: INTERNAL MEDICINE

## 2020-06-05 PROCEDURE — 83605 ASSAY OF LACTIC ACID: CPT

## 2020-06-05 PROCEDURE — 36415 COLL VENOUS BLD VENIPUNCTURE: CPT

## 2020-06-05 PROCEDURE — 6370000000 HC RX 637 (ALT 250 FOR IP): Performed by: FAMILY MEDICINE

## 2020-06-05 PROCEDURE — APPNB30 APP NON BILLABLE TIME 0-30 MINS: Performed by: NURSE PRACTITIONER

## 2020-06-05 PROCEDURE — 6360000002 HC RX W HCPCS: Performed by: INTERNAL MEDICINE

## 2020-06-05 PROCEDURE — 3430000000 HC RX DIAGNOSTIC RADIOPHARMACEUTICAL: Performed by: INTERNAL MEDICINE

## 2020-06-05 PROCEDURE — 76536 US EXAM OF HEAD AND NECK: CPT

## 2020-06-05 PROCEDURE — 80048 BASIC METABOLIC PNL TOTAL CA: CPT

## 2020-06-05 PROCEDURE — 94760 N-INVAS EAR/PLS OXIMETRY 1: CPT

## 2020-06-05 PROCEDURE — 82550 ASSAY OF CK (CPK): CPT

## 2020-06-05 PROCEDURE — 99233 SBSQ HOSP IP/OBS HIGH 50: CPT | Performed by: SURGERY

## 2020-06-05 PROCEDURE — 86376 MICROSOMAL ANTIBODY EACH: CPT

## 2020-06-05 PROCEDURE — 93005 ELECTROCARDIOGRAM TRACING: CPT | Performed by: INTERNAL MEDICINE

## 2020-06-05 PROCEDURE — 6370000000 HC RX 637 (ALT 250 FOR IP): Performed by: INTERNAL MEDICINE

## 2020-06-05 PROCEDURE — 93010 ELECTROCARDIOGRAM REPORT: CPT | Performed by: INTERNAL MEDICINE

## 2020-06-05 PROCEDURE — 84484 ASSAY OF TROPONIN QUANT: CPT

## 2020-06-05 PROCEDURE — 85025 COMPLETE CBC W/AUTO DIFF WBC: CPT

## 2020-06-05 PROCEDURE — 93017 CV STRESS TEST TRACING ONLY: CPT

## 2020-06-05 PROCEDURE — APPSS30 APP SPLIT SHARED TIME 16-30 MINUTES: Performed by: NURSE PRACTITIONER

## 2020-06-05 RX ORDER — ATORVASTATIN CALCIUM 20 MG/1
20 TABLET, FILM COATED ORAL NIGHTLY
Qty: 30 TABLET | Refills: 3 | Status: SHIPPED | OUTPATIENT
Start: 2020-06-05 | End: 2021-06-18 | Stop reason: SDUPTHER

## 2020-06-05 RX ORDER — ASPIRIN 81 MG/1
81 TABLET, CHEWABLE ORAL DAILY
Qty: 30 TABLET | Refills: 3 | Status: ON HOLD | OUTPATIENT
Start: 2020-06-06 | End: 2022-08-04 | Stop reason: SDUPTHER

## 2020-06-05 RX ORDER — AMLODIPINE BESYLATE 10 MG/1
10 TABLET ORAL DAILY
Qty: 30 TABLET | Refills: 3 | Status: SHIPPED | OUTPATIENT
Start: 2020-06-06 | End: 2021-06-18

## 2020-06-05 RX ORDER — ASPIRIN 81 MG/1
81 TABLET, CHEWABLE ORAL DAILY
Status: DISCONTINUED | OUTPATIENT
Start: 2020-06-05 | End: 2020-06-05 | Stop reason: HOSPADM

## 2020-06-05 RX ORDER — ATORVASTATIN CALCIUM 20 MG/1
20 TABLET, FILM COATED ORAL NIGHTLY
Status: DISCONTINUED | OUTPATIENT
Start: 2020-06-05 | End: 2020-06-05 | Stop reason: HOSPADM

## 2020-06-05 RX ORDER — AMLODIPINE BESYLATE 10 MG/1
10 TABLET ORAL DAILY
Status: DISCONTINUED | OUTPATIENT
Start: 2020-06-06 | End: 2020-06-05 | Stop reason: HOSPADM

## 2020-06-05 RX ORDER — HYDROCHLOROTHIAZIDE 25 MG/1
12.5 TABLET ORAL DAILY
Qty: 30 TABLET | Refills: 0 | Status: SHIPPED | OUTPATIENT
Start: 2020-06-06 | End: 2020-07-27

## 2020-06-05 RX ORDER — HYDROCHLOROTHIAZIDE 25 MG/1
25 TABLET ORAL DAILY
Status: DISCONTINUED | OUTPATIENT
Start: 2020-06-05 | End: 2020-06-05 | Stop reason: HOSPADM

## 2020-06-05 RX ADMIN — SODIUM CHLORIDE, PRESERVATIVE FREE 10 ML: 5 INJECTION INTRAVENOUS at 09:23

## 2020-06-05 RX ADMIN — REGADENOSON 0.4 MG: 0.08 INJECTION, SOLUTION INTRAVENOUS at 11:43

## 2020-06-05 RX ADMIN — AMLODIPINE BESYLATE 5 MG: 5 TABLET ORAL at 09:23

## 2020-06-05 RX ADMIN — ASPIRIN 81 MG 81 MG: 81 TABLET ORAL at 14:57

## 2020-06-05 RX ADMIN — TETROFOSMIN 10 MILLICURIE: 1.38 INJECTION, POWDER, LYOPHILIZED, FOR SOLUTION INTRAVENOUS at 09:45

## 2020-06-05 RX ADMIN — TETROFOSMIN 30 MILLICURIE: 1.38 INJECTION, POWDER, LYOPHILIZED, FOR SOLUTION INTRAVENOUS at 11:41

## 2020-06-05 RX ADMIN — HYDROCHLOROTHIAZIDE 25 MG: 25 TABLET ORAL at 14:27

## 2020-06-05 ASSESSMENT — PAIN SCALES - GENERAL
PAINLEVEL_OUTOF10: 0
PAINLEVEL_OUTOF10: 0

## 2020-06-05 NOTE — CONSULTS
continues to rise, the patient will be considered for  coronary angiography tomorrow. If troponin remains flat, we will first  proceed with chemical stress test to rule out significant ischemic heart  disease. 2.  We will obtain an echocardiogram in the morning. 3.  We will start the patient on Norvasc 5 mg daily for his  hypertension. 4.  We will make further recommendation after reviewing his  above-mentioned test.    I appreciate the opportunity to participate in the care of this pleasant  male.         Kristy Steel MD    D: 06/04/2020 16:23:56       T: 06/04/2020 21:01:27     [email or web address]  Job#: [id number]     Doc#: [id number]    CC:

## 2020-06-05 NOTE — PROGRESS NOTES
4 Eyes Skin Assessment     The patient is being assess for  Admission    I agree that 2 RN's have performed a thorough Head to Toe Skin Assessment on the patient. ALL assessment sites listed below have been assessed. Areas assessed by both nurses: Alexandrea Hui and Shabbir Hdz  [x]   Head, Face, and Ears   [x]   Shoulders, Back, and Chest  [x]   Arms, Elbows, and Hands   [x]   Coccyx, Sacrum, and IschIum  [x]   Legs, Feet, and Heels        Does the Patient have Skin Breakdown?   No         Kurtis Prevention initiated:  No   Wound Care Orders initiated:  No      C nurse consulted for Pressure Injury (Stage 3,4, Unstageable, DTI, NWPT, and Complex wounds), New and Established Ostomies:  No      Nurse 1 eSignature: Electronically signed by Jacinto Knapp RN on 6/4/20 at 2:49 PM EDT    **SHARE this note so that the co-signing nurse is able to place an eSignature**    Nurse 2 eSignature: Electronically signed by Erik Golden RN on 6/4/20 at 2:50 PM EDT
Data- discharge order received, pt verbalized agreement to discharge, disposition to previous residence, no needs for HHC/DME. Action- discharge instructions prepared and given to pt, pt verbalized understanding. Medication information packet given r/t NEW and/or CHANGED prescriptions emphasizing name/purpose/side effects, pt verbalized understanding. Discharge instruction summary: Diet- cardiac, Activity- return to normal, Primary Care Physician as follows: No primary care provider on file. None f/u appointment within 1 week, immunizations reviewed and complete, prescription medications filled HDmessaging. Response- Pt belongings gathered, IV removed. Disposition is home (no HHC/DME needs), transported with self, taken to lobby via independently with no complications.
[facility]        PATIENT NAME: Judy Limon Sr.     TODAY'S DATE: 6/5/2020    CHIEF COMPLAINT:  Left ankle pain    SUBJECTIVE:    NO acute events overnight    Pt resting in bed, left leg  And ankle pain better. Denies fever or chills    REVIEW OF SYSTEMS:  CONSTITUTIONAL:  negative  HEENT:  negative  RESPIRATORY:  negative  CARDIOVASCULAR:  negative  GASTROINTESTINAL:  negative   GENITOURINARY:  negative  HEMATOLOGIC/LYMPHATIC:  negative  NEUROLOGICAL:  negative  SKIN: negative except left ankle bruising, pain and left calf tenderness       OBJECTIVE:  VITALS:  BP (!) 159/76   Pulse 66   Temp 98.3 °F (36.8 °C) (Oral)   Resp 18   Ht 5' 7\" (1.702 m)   Wt 146 lb 9.7 oz (66.5 kg)   SpO2 98%   BMI 22.96 kg/m²     INTAKE/OUTPUT:    I/O last 3 completed shifts: In: 360 [P.O.:360]  Out: -   No intake/output data recorded. Drain/tube Output:           CONSTITUTIONAL:  awake and alert  EYES:  sclera clear  ENT:  Normocephalic, when talking bottom lip and tongue deviate to right side. Smile symmetrical, tongue movement normal, no slurred speech   NECK:  supple, symmetrical, trachea midline  RESP: no increased WOB  ABD: -  Soft, NT  VASCULAR:  pedal pulses normal both DP's and PT's  SKIN: bruising left ankle and calf, mild tender to palp  WOUND: none      Data:  CBC:   Recent Labs     06/04/20  0958 06/05/20  0439   WBC 3.3* 3.5*   HGB 13.5 13.8   HCT 39.3* 41.0    239     BMP:    Recent Labs     06/04/20  0958 06/05/20  0439    137   K 3.5 3.7    101   CO2 22 25   BUN 7 9   CREATININE 0.8 0.8   GLUCOSE 112* 97     Hepatic: No results for input(s): AST, ALT, ALB, BILITOT, ALKPHOS in the last 72 hours. Mag:      Recent Labs     06/04/20  0958   MG 1.90      Phos:   No results for input(s): PHOS in the last 72 hours.    INR:   Recent Labs     06/04/20  0958   INR 0.95     Lipase and amylase: No results for input(s): LIPASE, AMYLASE in the last 72
Pt admitted to  5230. Admission and medications complete. Pt alert and oriented to room and call light. Pt able to ambulate independently. Bedside table and call light within reach. Pt in bed and in the lowest position.
chloride flush, acetaminophen **OR** acetaminophen, polyethylene glycol, promethazine **OR** ondansetron    Lab Data:  CBC:   Recent Labs     06/04/20  0958 06/05/20  0439   WBC 3.3* 3.5*   HGB 13.5 13.8    239     BMP:    Recent Labs     06/04/20  0958 06/05/20  0439    137   K 3.5 3.7   CO2 22 25   BUN 7 9   CREATININE 0.8 0.8     LIVR: No results for input(s): AST, ALT in the last 72 hours. INR:    Recent Labs     06/04/20  0958   INR 0.95     APTT:   Recent Labs     06/04/20 0958   APTT 33.4     BNP:  No results for input(s): BNP in the last 72 hours. Imaging:Left ventricular cavity size is normal.   Normal left ventricular wall thickness. Ejection fraction is visually estimated to be 55-60%. Mitral valve leaflets appear mildly thickened. Trivial mitral regurgitation. Trivial tricuspid regurgitation. Nuclear stress test(6/5/20)     1. Technically a satisfactory study.    2. Normal pharmacological stress portion of the study.    3. No evidence of Ischemia by Myocardial Perfusion Imaging.    4. Gated Study shows normal LV size and Systolic function; EF is 67 %.             Assessment/Plan:  1) Troponin elevation  - Mildy elevated  - Normal stress/myoview  - CT scan showed extensive CAD    2) CAD  - extensive CAD by CT chest but has no reversible ischemia bu nuclear stress test  - will need ASA, statin to prevent future cardiac events  -     3) HTN  - BP is better after he was started on norvasc. Will sign off. . will need to be seen in the oofice in 2-3 months.       Electronically signed by Zaida Moraes MD on 6/5/2020 at 2:14 PM

## 2020-06-09 NOTE — DISCHARGE SUMMARY
MYOCARDIAL SPECT REST EXERCISE OR RX      Study location: [facility]      Indications: Chest pain and Dyspnea upon exertion.                     Hospital Status: Inpatient.     Height: 67 inches Weight: 146 pounds     Risk Factors      The patient risk factors include:physical activity, Current/Recent(w/in 1   year) tobacco use and (pack years: 10).     Conclusions      Summary   Pharmacological Stress/MPI Results:      1.  Technically a satisfactory study.   2. Normal pharmacological stress portion of the study.   3. No evidence of Ischemia by Myocardial Perfusion Imaging.   4. Gated Study shows normal LV size and Systolic function; EF is 67 %.     Stress Protocols      Resting ECG   Normal sinus rhythm with normal ST segment.      Resting HR:62 bpm             Resting BP:158/89 mmHg      Pre-stress physical exam: Rest Lexiscan Stress Test:   Findings on the brief pre-stress cardiopulmonary exam:   unremarkable heart and lungs.   Prior to test initiation, patient states he is asymptomatic.   Pre-Test SpO2 = 99% RA.     Stress Protocol:Pharmacologic - Lexiscan's     Peak HR:117 bpm                  HR response: Normal   Peak BP:181/84 mmHg              BP response: Normal   Predicted HR: 160 bpm            HR/BP product:30534   % of predicted HR: 73   Test duration: 1 min and 40 sec   Reason for termination:Completed      ECG Findings   No ischemic ST changes.      Symptoms   Developed symptoms likely related to Corazon Darden was stress induced shortness of breath,   lightheadedness and mild belly cramps/nausea.   Symptoms resolved with rest.   Post-Test SpO2 = 479 % RA.      Complications   Procedure complication was none.      Stress Interpretation   Negative pharmacological stress portion of the study.     Procedure Medications    - Lexiscan I.V. 0.4 mg.     Imaging Protocols      - One Day      Rest                       Stress      Isotope:Myoview            Isotope: Junction! Yes       ! Yes            !None      !  +------------------------+----------+---------------+----------+  ! GSV Thigh               ! Yes       ! Yes            !None      !  +------------------------+----------+---------------+----------+  ! Common Femoral          ! Yes       ! Yes            !None      !  +------------------------+----------+---------------+----------+  ! Prox Femoral            ! Yes       ! Yes            !None      !  +------------------------+----------+---------------+----------+  ! Mid Femoral             ! Yes       ! Yes            !None      !  +------------------------+----------+---------------+----------+  ! Dist Femoral            ! Yes       ! Yes            !None      !  +------------------------+----------+---------------+----------+  ! Deep Femoral            ! Yes       ! Yes            !None      !  +------------------------+----------+---------------+----------+  ! Popliteal               ! Yes       ! Yes            !None      !  +------------------------+----------+---------------+----------+  ! GSV Below Knee          ! Yes       ! Yes            !None      !  +------------------------+----------+---------------+----------+  ! Gastroc                 ! Yes       ! Yes            !None      !  +------------------------+----------+---------------+----------+  ! Soleal                  ! Yes       ! Yes            !None      !  +------------------------+----------+---------------+----------+  ! PTV                     ! Yes       ! Yes            !None      !  +------------------------+----------+---------------+----------+  ! ATV                     ! Yes       ! Yes            !None      !  +------------------------+----------+---------------+----------+  ! Peroneal                ! Yes       ! Yes            !None      !  +------------------------+----------+---------------+----------+  ! GSV Calf                ! Yes       ! Yes            !None      !  +------------------------+----------+---------------+----------+  ! SSV                     ! Yes       ! Yes            !None      !  +------------------------+----------+---------------+----------+    Left Doppler Measurements  +--------------+------+------+------------+  ! Location      !Signal!Reflux! Reflux (sec)! +--------------+------+------+------------+  ! Common Femoral!Phasic! No    !            !  +--------------+------+------+------------+  ! Popliteal     !Phasic! No    !            !  +--------------+------+------+------------+     CT CHEST WO CONTRAST [[id number]] Collected: 06/04/20 1019     Order Status: Completed Updated: 06/04/20 1300     Narrative:       EXAMINATION:  CT OF THE CHEST WITHOUT CONTRAST 6/4/2020 10:09 am    TECHNIQUE:  CT of the chest was performed without the administration of intravenous  contrast. Multiplanar reformatted images are provided for review. Dose  modulation, iterative reconstruction, and/or weight based adjustment of the  mA/kV was utilized to reduce the radiation dose to as low as reasonably  achievable. COMPARISON:  Chest x-ray of the same date    HISTORY:  ORDERING SYSTEM PROVIDED HISTORY: tracheal deviation  TECHNOLOGIST PROVIDED HISTORY:  Reason for exam:->tracheal deviation  Reason for Exam: tracheal diviation  Acuity: Acute  Type of Exam: Initial    Evaluate abnormal radiographic finding seen on recent chest x-ray.  Follow-up  study. FINDINGS:  Mediastinum: The heart is normal in size.  There is no pericardial effusion. Severe coronary artery calcifications are present.  The thoracic aorta is  atherosclerotic, but nonaneurysmal.  No mediastinal or hilar lymphadenopathy  is demonstrated. 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     Lungs/pleura: No focal airspace consolidation, pleural effusion or  pneumothorax is demonstrated.  There is emphysema at the right lung apex.  No  suspicious or

## 2020-06-22 ENCOUNTER — OFFICE VISIT (OUTPATIENT)
Dept: PRIMARY CARE CLINIC | Age: 60
End: 2020-06-22
Payer: COMMERCIAL

## 2020-06-22 VITALS — TEMPERATURE: 98.3 F | OXYGEN SATURATION: 98 % | HEART RATE: 90 BPM

## 2020-06-22 PROCEDURE — 99211 OFF/OP EST MAY X REQ PHY/QHP: CPT | Performed by: INTERNAL MEDICINE

## 2020-06-24 LAB
SARS-COV-2: NOT DETECTED
SOURCE: NORMAL

## 2020-07-11 ENCOUNTER — APPOINTMENT (OUTPATIENT)
Dept: GENERAL RADIOLOGY | Age: 60
End: 2020-07-11
Payer: COMMERCIAL

## 2020-07-11 ENCOUNTER — HOSPITAL ENCOUNTER (EMERGENCY)
Age: 60
Discharge: HOME | End: 2020-07-11
Attending: EMERGENCY MEDICINE
Payer: COMMERCIAL

## 2020-07-11 VITALS
BODY MASS INDEX: 24.15 KG/M2 | OXYGEN SATURATION: 99 % | TEMPERATURE: 98.6 F | SYSTOLIC BLOOD PRESSURE: 133 MMHG | HEIGHT: 67 IN | WEIGHT: 153.88 LBS | DIASTOLIC BLOOD PRESSURE: 74 MMHG | HEART RATE: 73 BPM | RESPIRATION RATE: 16 BRPM

## 2020-07-11 LAB
A/G RATIO: 1.4 (ref 1.1–2.2)
ALBUMIN SERPL-MCNC: 3.9 G/DL (ref 3.4–5)
ALP BLD-CCNC: 100 U/L (ref 40–129)
ALT SERPL-CCNC: 40 U/L (ref 10–40)
ANION GAP SERPL CALCULATED.3IONS-SCNC: 16 MMOL/L (ref 3–16)
AST SERPL-CCNC: 50 U/L (ref 15–37)
BASOPHILS ABSOLUTE: 0.1 K/UL (ref 0–0.2)
BASOPHILS RELATIVE PERCENT: 1.5 %
BILIRUB SERPL-MCNC: 0.4 MG/DL (ref 0–1)
BUN BLDV-MCNC: 7 MG/DL (ref 7–20)
CALCIUM SERPL-MCNC: 8.3 MG/DL (ref 8.3–10.6)
CHLORIDE BLD-SCNC: 102 MMOL/L (ref 99–110)
CO2: 21 MMOL/L (ref 21–32)
CREAT SERPL-MCNC: 0.8 MG/DL (ref 0.8–1.3)
EOSINOPHILS ABSOLUTE: 0.1 K/UL (ref 0–0.6)
EOSINOPHILS RELATIVE PERCENT: 2.8 %
GFR AFRICAN AMERICAN: >60
GFR NON-AFRICAN AMERICAN: >60
GLOBULIN: 2.7 G/DL
GLUCOSE BLD-MCNC: 112 MG/DL (ref 70–99)
HCT VFR BLD CALC: 36.6 % (ref 40.5–52.5)
HEMOGLOBIN: 12.6 G/DL (ref 13.5–17.5)
LYMPHOCYTES ABSOLUTE: 1 K/UL (ref 1–5.1)
LYMPHOCYTES RELATIVE PERCENT: 28.6 %
MCH RBC QN AUTO: 34.1 PG (ref 26–34)
MCHC RBC AUTO-ENTMCNC: 34.5 G/DL (ref 31–36)
MCV RBC AUTO: 99 FL (ref 80–100)
MONOCYTES ABSOLUTE: 0.5 K/UL (ref 0–1.3)
MONOCYTES RELATIVE PERCENT: 13.2 %
NEUTROPHILS ABSOLUTE: 2 K/UL (ref 1.7–7.7)
NEUTROPHILS RELATIVE PERCENT: 53.9 %
PDW BLD-RTO: 13.9 % (ref 12.4–15.4)
PLATELET # BLD: 265 K/UL (ref 135–450)
PMV BLD AUTO: 6.9 FL (ref 5–10.5)
POTASSIUM SERPL-SCNC: 3.8 MMOL/L (ref 3.5–5.1)
RBC # BLD: 3.69 M/UL (ref 4.2–5.9)
SODIUM BLD-SCNC: 139 MMOL/L (ref 136–145)
TOTAL PROTEIN: 6.6 G/DL (ref 6.4–8.2)
TROPONIN: <0.01 NG/ML
WBC # BLD: 3.6 K/UL (ref 4–11)

## 2020-07-11 PROCEDURE — 6370000000 HC RX 637 (ALT 250 FOR IP): Performed by: NURSE PRACTITIONER

## 2020-07-11 PROCEDURE — 80053 COMPREHEN METABOLIC PANEL: CPT

## 2020-07-11 PROCEDURE — 99285 EMERGENCY DEPT VISIT HI MDM: CPT

## 2020-07-11 PROCEDURE — 71046 X-RAY EXAM CHEST 2 VIEWS: CPT

## 2020-07-11 PROCEDURE — 93005 ELECTROCARDIOGRAM TRACING: CPT | Performed by: NURSE PRACTITIONER

## 2020-07-11 PROCEDURE — 85025 COMPLETE CBC W/AUTO DIFF WBC: CPT

## 2020-07-11 PROCEDURE — 93005 ELECTROCARDIOGRAM TRACING: CPT

## 2020-07-11 PROCEDURE — 84484 ASSAY OF TROPONIN QUANT: CPT

## 2020-07-11 RX ORDER — ASPIRIN 81 MG/1
81 TABLET, CHEWABLE ORAL ONCE
Status: COMPLETED | OUTPATIENT
Start: 2020-07-11 | End: 2020-07-11

## 2020-07-11 RX ADMIN — ASPIRIN 81 MG 81 MG: 81 TABLET ORAL at 11:19

## 2020-07-11 ASSESSMENT — PAIN DESCRIPTION - ONSET
ONSET: ON-GOING
ONSET: GRADUAL
ONSET: ON-GOING

## 2020-07-11 ASSESSMENT — PAIN - FUNCTIONAL ASSESSMENT
PAIN_FUNCTIONAL_ASSESSMENT: 0-10
PAIN_FUNCTIONAL_ASSESSMENT: ACTIVITIES ARE NOT PREVENTED
PAIN_FUNCTIONAL_ASSESSMENT: ACTIVITIES ARE NOT PREVENTED

## 2020-07-11 ASSESSMENT — PAIN SCALES - GENERAL
PAINLEVEL_OUTOF10: 7
PAINLEVEL_OUTOF10: 3
PAINLEVEL_OUTOF10: 3

## 2020-07-11 ASSESSMENT — PAIN DESCRIPTION - LOCATION
LOCATION: CHEST

## 2020-07-11 ASSESSMENT — PAIN DESCRIPTION - PROGRESSION
CLINICAL_PROGRESSION: NOT CHANGED
CLINICAL_PROGRESSION: GRADUALLY IMPROVING
CLINICAL_PROGRESSION: GRADUALLY IMPROVING

## 2020-07-11 ASSESSMENT — PAIN DESCRIPTION - FREQUENCY
FREQUENCY: CONTINUOUS

## 2020-07-11 ASSESSMENT — PAIN DESCRIPTION - ORIENTATION
ORIENTATION: LEFT;MID
ORIENTATION: MID;LEFT

## 2020-07-11 ASSESSMENT — PAIN DESCRIPTION - PAIN TYPE
TYPE: ACUTE PAIN

## 2020-07-11 ASSESSMENT — PAIN DESCRIPTION - DESCRIPTORS
DESCRIPTORS: ACHING

## 2020-07-11 ASSESSMENT — HEART SCORE
ECG: 1
ECG: 0

## 2020-07-11 NOTE — ED PROVIDER NOTES
[address]  [address] [address]  Dept: [phone]  Loc: [address] [place] [address] COMPLAINT    Chief Complaint   Patient presents with    Chest Pain     pt states chest pain for a few days. HPI    Yonis Short Sr. is a 61 y.o. male who presents to the emergency department with left-sided chest pain. He states that he was admitted to the hospital in early June for the same chest pain. He had an echocardiogram and a stress test during this admission. He states about 2 weeks ago he returned to work and he was lifting 60 pound boxes at work when the left-sided chest pain came on. He states the chest pain went away after he sat down to rest but then returned with exertion. He states over the last 2 to 3 days he has now developed a constant left-sided chest pain along with shortness of breath. He had a stress test and echocardiogram.  At the time of discharge he was started on hypertension medication and a statin but he has not followed up with any physicians since discharge. And that the chest pain had resolved but it returned after he went to work. He states that he has shortness of breath now over the last few days that has developed. He states the chest pain is sharp and rates it as 6/10 and is been constant. It does not radiate. He denies body aches, fever, chills, lightheadedness, dizziness, abdominal pain, nausea or vomiting. He is 1/2 pack/day cigarette smoker. REVIEW OF SYSTEMS    Cardiac: see HPI, no syncope  Respiratory: + shortness of breath, denies cough, no hemoptysis  GI: No vomiting or diarrhea  : No dysuria or hematuria  General: No fever or chills  All other systems reviewed and are negative unless noted in the HPI. PAST MEDICAL & SURGICAL HISTORY    History reviewed. No pertinent past medical history. History reviewed.  No pertinent surgical history. CURRENT MEDICATIONS  (may include discharge medications prescribed in the ED)  Current Outpatient Rx   Medication Sig Dispense Refill    aspirin 81 MG chewable tablet Take 1 tablet by mouth daily 30 tablet 3    atorvastatin (LIPITOR) 20 MG tablet Take 1 tablet by mouth nightly 30 tablet 3    amLODIPine (NORVASC) 10 MG tablet Take 1 tablet by mouth daily 30 tablet 3    hydroCHLOROthiazide (HYDRODIURIL) 25 MG tablet Take 0.5 tablets by mouth daily 30 tablet 0       ALLERGIES    No Known Allergies    SOCIAL & FAMILY HISTORY    Social History     Socioeconomic History    Marital status: Single     Spouse name: None    Number of children: None    Years of education: None    Highest education level: None   Occupational History    None   Social Needs    Financial resource strain: None    Food insecurity     Worry: None     Inability: None    Transportation needs     Medical: None     Non-medical: None   Tobacco Use    Smoking status: Current Every Day Smoker     Packs/day: 0.50     Types: Cigarettes    Smokeless tobacco: Never Used   Substance and Sexual Activity    Alcohol use: Never     Frequency: Never    Drug use: Never    Sexual activity: None   Lifestyle    Physical activity     Days per week: None     Minutes per session: None    Stress: None   Relationships    Social connections     Talks on phone: None     Gets together: None     Attends Christian service: None     Active member of club or organization: None     Attends meetings of clubs or organizations: None     Relationship status: None    Intimate partner violence     Fear of current or ex partner: None     Emotionally abused: None     Physically abused: None     Forced sexual activity: None   Other Topics Concern    None   Social History Narrative    None     History reviewed. No pertinent family history.     PHYSICAL EXAM    VITAL SIGNS: /74   Pulse 73   Temp 98.6 °F (37 °C) (Oral)   Resp 16   Ht 5' 7\" (1.702 m) Wt 153 lb 14.1 oz (69.8 kg)   SpO2 99%   BMI 24.10 kg/m²    Constitutional:  Well developed, well nourished, no acute distress   HENT:  Atraumatic, moist mucus membranes. Edentulous. Neck: supple, no JVD   Respiratory: Breath sounds clear throughout auscultation. Respirations are even and unlabored. No cough noted. On room air. No distress. Cardiovascular: Regular rhythm and rate, S1-S2. No murmur  Vascular: Radial and DP pulses 2+ and equal bilaterally. No peripheral edema. GI:  Soft, nontender, normal bowel sounds  Musculoskeletal:  no lower extremity edema, no lower extremity asymmetry, no calf tenderness, no thigh tenderness, no acute deformities  Integument:  Skin warm and dry, no petechiae   Neurologic:  Alert & oriented x4, poor historian, no slurred speech  Psych: Pleasant affect, no hallucinations    EKG      12 lead EKG reviewed by myself and interpreted by my attending physician Dr. Orlando Obrien. Ventricular rate 79 bpm, NC interval 134 ms, QRS duration 98 ms, QTc 458 ms  There is no ST elevation or depression. No abnormal T wave inversion noted. Interpretation is a normal sinus rhythm. Today's tracing was compared to the one on June 5, 2020 with no significant changes noted.     RADIOLOGY/PROCEDURES    XR CHEST STANDARD (2 VW)   Final Result   No acute findings           Labs Reviewed   CBC WITH AUTO DIFFERENTIAL - Abnormal; Notable for the following components:       Result Value    WBC 3.6 (*)     RBC 3.69 (*)     Hemoglobin 12.6 (*)     Hematocrit 36.6 (*)     MCH 34.1 (*)     All other components within normal limits    Narrative:     Performed at:  [address] [address]   Phone [phone]   COMPREHENSIVE METABOLIC PANEL - Abnormal; Notable for the following components:    Glucose 112 (*)     AST 50 (*)     All other components within normal limits    Narrative:     Performed at:  [facility]  [address] [place], [place] [address]   Phone [phone]   TROPONIN    Narrative:     Performed at:  [facility]  [address] [address]   Phone [phone]       ED COURSE & MEDICAL DECISION MAKING    Pertinent Labs & Imaging studies reviewed and interpreted. (See chart for details)    See chart for details of medications given during the ED stay. Vitals:    07/11/20 1316 07/11/20 1331 07/11/20 1346 07/11/20 1417   BP: 131/78 130/71 (!) 143/71 133/74   Pulse: 71 70 69 73   Resp: 18 21 12 16   Temp:       TempSrc:       SpO2: 98% 97% 97% 99%   Weight:       Height:           Medications   aspirin chewable tablet 81 mg (81 mg Oral Given 7/11/20 1119)     Patient was seen and evaluated by myself my attending physician Dr. April Stevens. Differential diagnosis includes but is not limited to ACS, PE, pneumonia, anxiety, thoracic aortic dissection, other. He is nontoxic in appearance and hemodynamically stable. Constant sharp left-sided chest pain that is nonradiating for 2 weeks. He did develop some shortness of breath over the last few days. He is 1/2 pack/day cigarette smoker. The patient's twelve-lead EKG is a normal sinus rhythm. Troponin is less than 0.01. White blood cell count is 3.6 with a hemoglobin of 12.6 and a hematocrit of 36.6 with a normal MCV and an elevated MCH of 34.1. Electrolytes are unremarkable. Kidney function is good. Two-view chest x-ray is interpreted by radiology and reviewed by myself show a negative chest film. His heart score is 3. On June 5, 2020 he had an echocardiogram with an EF of 55 to 60%. He has trivial mitral regurgitation and tricuspid regurgitation. On June 4, 2020 he had a normal pharmacological stress test that showed no evidence of ischemia.     Chart review reveals a CT of the chest without contrast done on June 4, 2020 that shows no acute abnormality in the chest.  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 And severe coronary artery disease. 6/5/2020 Echo Summary   Left ventricular cavity size is normal.   Normal left ventricular wall thickness. Ejection fraction is visually estimated to be 55-60%. Mitral valve leaflets appear mildly thickened. Trivial mitral regurgitation. Trivial tricuspid regurgitation. 6/4/2020 NM Cardiac Stress Test  Summary     Pharmacological Stress/MPI Results:          1. Technically a satisfactory study.     2. Normal pharmacological stress portion of the study.     3. No evidence of Ischemia by Myocardial Perfusion Imaging.     4. Gated Study shows normal LV size and Systolic function; EF is 67 %. Dr. Jesusita Jaeger spoke with cardiologist Dr. Clay Santamaria regarding the patient and the fact that he had severe coronary artery disease that was noted on a recent CT of the chest without contrast.  A second EKG was obtained and was unremarkable. Please see Dr. Byron cifuentes's note for interpretation of other repeat EKG. At this time I am going to discharge the patient with primary care physician and cardiology to follow-up with. Patient was given strict instructions to call the cardiologist on Monday who can then schedule for a cardiac cath. He is to be off work and avoid all exertional situations as the patient sounds like he is demonstrating angina with exertion. Smoking cessation was also reviewed. He was instructed to return to emergency department for worsening symptoms. Patient verbalized understanding of the discharge instructions. FINAL IMPRESSION    1. Chest pain, unspecified type    2.  Encounter for smoking cessation counseling        PLAN  Discharge with strict PCP    (Please note that this note was completed with a voice recognition program.  Every attempt was made to edit the dictations, but inevitably there remain words that are mis-transcribed.)        Vinie Rinne Skip  - CNP  07/11/20 9872

## 2020-07-11 NOTE — ED PROVIDER NOTES
I independently evaluated and obtained a history and physical on Tank Van. .    All diagnostic, treatment, and disposition assistants were made to myself in conjunction the advanced practice provider. For further details of this patient's emergency department encounter, please see the advanced practice provider's documentation. History: This patient presents emergency department complaint of chest discomfort. He was seen and evaluated in our hospital in June of this year. At that time he had a troponin of 0.02. Subsequent troponins were normal.  He underwent a CT of the chest without contrast that showed severe coronary artery disease. Given that he ruled out for an MI and underwent a myocardial perfusion scan that was read as normal.  The patient comes in today stating anytime he works at work he gets very short of breath. He also complains of a ache in his chest has been constant for the last 2 days. Not ripping or tearing pain. Not pleuritic pain. Physician Exam: Pleasant male no acute distress. His cardiac exam is unremarkable. His lungs are clear to auscultation bilaterally. No wheezing rales or rhonchi. The patient has had constant, unremitting chest pain for greater than 24 hours. He has a negative troponin. He has an EKG x2 without any acute changes on it. I spoke with Dr. Penelope Beck of cardiology. The patient is currently reporting continued chest discomfort with work but at rest he is not having this shortness of breath or fatigue. Given that his troponin is negative we feel that he is safe for discharge home, but will need an outpatient cardiac cath. He is to call cardiology on Monday and they will work him in urgently for cardiac cath. Return for any worsening or change of his chest discomfort. He will be placed on a work note and advised not to exert himself until cleared by cardiology.       The Ekg interpreted by me shows  normal sinus rhythm with a rate of 79  Axis is   76  QTc is  458 msec  Intervals and Durations are unremarkable.       ST Segments: nonspecific changes  No significant change from prior EKG dated June 5, 2020    ECG #2 - Unchanged       Randi Tello MD  07/11/20 [address], [place]  07/11/20 2925

## 2020-07-12 LAB
EKG ATRIAL RATE: 70 BPM
EKG ATRIAL RATE: 79 BPM
EKG DIAGNOSIS: NORMAL
EKG DIAGNOSIS: NORMAL
EKG P AXIS: 73 DEGREES
EKG P AXIS: 76 DEGREES
EKG P-R INTERVAL: 134 MS
EKG P-R INTERVAL: 138 MS
EKG Q-T INTERVAL: 400 MS
EKG Q-T INTERVAL: 420 MS
EKG QRS DURATION: 102 MS
EKG QRS DURATION: 98 MS
EKG QTC CALCULATION (BAZETT): 453 MS
EKG QTC CALCULATION (BAZETT): 458 MS
EKG R AXIS: 17 DEGREES
EKG R AXIS: 31 DEGREES
EKG T AXIS: 56 DEGREES
EKG T AXIS: 56 DEGREES
EKG VENTRICULAR RATE: 70 BPM
EKG VENTRICULAR RATE: 79 BPM

## 2020-07-12 PROCEDURE — 93010 ELECTROCARDIOGRAM REPORT: CPT | Performed by: INTERNAL MEDICINE

## 2020-07-27 ENCOUNTER — OFFICE VISIT (OUTPATIENT)
Dept: CARDIOLOGY CLINIC | Age: 60
End: 2020-07-27
Payer: COMMERCIAL

## 2020-07-27 VITALS
HEART RATE: 72 BPM | SYSTOLIC BLOOD PRESSURE: 128 MMHG | WEIGHT: 151 LBS | DIASTOLIC BLOOD PRESSURE: 76 MMHG | OXYGEN SATURATION: 98 % | BODY MASS INDEX: 23.7 KG/M2 | TEMPERATURE: 98.1 F | HEIGHT: 67 IN

## 2020-07-27 PROCEDURE — 99215 OFFICE O/P EST HI 40 MIN: CPT | Performed by: INTERNAL MEDICINE

## 2020-07-27 PROCEDURE — 93000 ELECTROCARDIOGRAM COMPLETE: CPT | Performed by: INTERNAL MEDICINE

## 2020-07-27 NOTE — PROGRESS NOTES
[facility]  Cardiology Progress Note    Wendy Damon Sr.  1960 July 27, 2020    Referring Physician: [facility]  Reason for Referral: Chest pain     CC: \"I still have shortness of breath and chest pain with all activity. \"     HPI:  The patient is 61 y.o. male with a past medical history significant for HTN, smoking addiction who presents after presenting to the ED on 7/11/20 with recurrent CP. He had also been seen 6/4/20 for an overnight stay for CP, SOB with echo, Lexiscan stress and CT chest wo contrast upon my further evaluation during this hospitalization. Today, he reports that he has shortness of breath with all activity especially moderate-heavy exertion. He wakes at night wheezing and short of breath. This has been ongoing for at least the last 1 month. He also complaints of stabbing chest pain, mid-sternal and radiates through to his back. Occasional hot flashes, denies any cold sweats. 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 He is a current smoker 1 pack every 2 days. Sometimes 1.5 ppd. He is on norvasc for history of hypertension. He is now on statin and antiplatelet therapy. He reports compliance with medical therapy and feels he is tolerating. He denies any abnormal bruising or bleeding. He also reports that his left lower extremity turned purple but then returned to baseline. He denies any cramping BLE. He notes some numbness of the left hand. Patient denies any symptoms of  nausea, vomiting, near syncope, syncope, heart racing, palpitations, dizziness, lightheaded, wheezing, diaphoresis, BLE edema, bilateral lower extremities pain, cramping or fatigue. Past Medical History:   Diagnosis Date    Hypertension      History reviewed. No pertinent surgical history.   Family History   Problem Relation Age of Onset    Diabetes Mother     Liver Disease Father      Social History     Tobacco Use    Smoking status: Current Every Day Smoker     Packs/day: 0.50     Types: Cigarettes    Smokeless tobacco: Never Used   Substance Use Topics    Alcohol use: Yes     Frequency: Never     Comment: every once in awhile    Drug use: Yes     Types: Marijuana     Comment: occ       No Known Allergies  Current Outpatient Medications   Medication Sig Dispense Refill    aspirin 81 MG chewable tablet Take 1 tablet by mouth daily 30 tablet 3    atorvastatin (LIPITOR) 20 MG tablet Take 1 tablet by mouth nightly 30 tablet 3    amLODIPine (NORVASC) 10 MG tablet Take 1 tablet by mouth daily 30 tablet 3     No current facility-administered medications for this visit. Review of Systems:  · Constitutional: no unanticipated weight loss. There's been no change in energy level, sleep pattern, or activity level. No fevers, chills. · Eyes: No visual changes or diplopia. No scleral icterus. · ENT: No Headaches, hearing loss or vertigo. No mouth sores or sore throat. · Cardiovascular: as reviewed in HPI  · Respiratory: No cough or wheezing, no sputum production. No hematemesis. · Gastrointestinal: No abdominal pain, appetite loss, blood in stools. No change in bowel or bladder habits. · Genitourinary: No dysuria, trouble voiding, or hematuria. · Musculoskeletal:  No gait disturbance, no joint complaints. · Integumentary: No rash or pruritis. · Neurological: No headache, diplopia, change in muscle strength, numbness or tingling. · Psychiatric: No anxiety or depression. · Endocrine: No temperature intolerance. No excessive thirst, fluid intake, or urination. No tremor. · Hematologic/Lymphatic: No abnormal bruising or bleeding, blood clots or swollen lymph nodes. · Allergic/Immunologic: No nasal congestion or hives.     Physical Exam:   /76   Pulse 76   Temp 98.1 °F (36.7 °C)   Ht 5' 7\" (1.702 m)   Wt 151 lb (68.5 kg) Comment: with shoes  SpO2 98%   BMI 23.65 kg/m²   Wt Readings from Last 3 Encounters:   07/27/20 151 lb (68.5 kg)   07/11/20 153 lb 14.1 oz (69.8 kg)   06/05/20 146 lb 9.7 oz (66.5 kg)     Constitutional: He is oriented to person, place, and time. He appears well-developed and well-nourished. In no acute distress. Head: Normocephalic and atraumatic. Pupils equal and round. Neck: Neck supple. No JVP or carotid bruit appreciated. No mass and no thyromegaly present. No lymphadenopathy present. Cardiovascular: Normal rate. Normal heart sounds. Exam reveals no gallop and no friction rub. No murmur heard. Pulmonary/Chest: Effort normal and breath sounds normal. No respiratory distress. He has no wheezes, rhonchi or rales. Abdominal: Soft, non-tender. Bowel sounds are normal. He exhibits no organomegaly, mass or bruit. Extremities: No edema, cyanosis, or clubbing. Pulses are 2+ radial/dorsalis pedis/2+ posterior tibial/carotid bilaterally. Neurological: No gross cranial nerve deficit. Coordination normal.   Skin: Skin is warm and dry. There is no rash or diaphoresis. Psychiatric: He has a normal mood and affect. His speech is normal and behavior is normal.     Lab Review:    No results found for: TRIG, HDL, LDLCALC, LDLDIRECT, LABVLDL      Lab Results   Component Value Date     07/11/2020     Lab Results   Component Value Date    HGB 12.6 07/11/2020    HCT 36.6 07/11/2020     Lab Results   Component Value Date     07/11/2020      Lab Results   Component Value Date    K 3.8 07/11/2020    K 3.7 06/05/2020     Lab Results   Component Value Date    BUN 7 07/11/2020    CREATININE 0.8 07/11/2020       EKG Interpretation: 7/27/20 SR     Image Review:     CT chest wo contrast: 6/4/20  . No acute abnormality in the chest.    2. 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 3. Severe coronary artery disease       Echo: 6/4/20  Summary   Left ventricular cavity size is normal.   Normal left ventricular wall thickness. Ejection fraction is visually estimated to be 55-60%. Mitral valve leaflets appear mildly thickened. Trivial mitral regurgitation. Trivial tricuspid regurgitation. Stress study: 6/4/20  Summary     Pharmacological Stress/MPI Results:          1. Technically a satisfactory study.     2. Normal pharmacological stress portion of the study.     3. No evidence of Ischemia by Myocardial Perfusion Imaging.     4. Gated Study shows normal LV size and Systolic function; EF is 67 %. Assessment/Plan:     Coronary Calcification   Shortness of breath with exertion with stabbing chest pain. He is known to me as I evaluated him during his recent admission back in June. At that time, he had an abnormal severe coronary artery calcification per his CT Chest followed by [facility] Stress Test and Echocardiogram which were within acceptable limits. labs again reviewed. Today, he continues to complain of SOBE and chest pain brought on with any type of exertion, worsens with moderate-heavy exertion at work. He also has nocturnal SOB with wheezing. Even though his stress test showed no reversible ischemia, with his worsening symptoms of exertional shortness of breath and extensive coronary calcification seen on the prior cardiac CT I am concerned about significant underlying atherosclerotic heart disease. medical therapy at this time includes aspirini, lipitor and norvasc. I would like to proceed with a left and right heart cath to further assess for ischemic heart disease and   No iodine allergy, no medications need held prior. I had a detail discussion with the patient and the family members regarding the risk and benefit of the procedures. I explained to them the details of the procedure. I explained to them that the procedure will be performed using moderate sedation and local anaesthesia using lidocaine. I explained any risk of bleeding, perforation of the vessel, stroke, myocardial infarction or death.  The patient and the family members understood the risk and benefits and the details of procedure and would like to go ahead with the procedure. The patient gave informed consent. Hypertension, essential  Controlled on medical therapy. Smoking addiction  Smoking 1-1.5 ppd or every 2 days. I have stressed the importance of working towards smoking cessation and spent 2 minutes discussing. LLE discoloration   Isolated event per patient with leg turning purple. Distal pulses 2+ palpable today and LLE wnl. We will see him back in follow up post procedure   Complexity of medical decision making-very high    Thank you very much for allowing me to participate in the care of your patient. Please do not hesitate to contact me if you have any questions. Sincerely,  Iam Jones MD      [facility] [address] [place] [address]  Ph: [phone]  Fax: [phone]    This note was scribed in the presence of Dr. Romel Hernández MD by Krystal Alaniz RN. Physician Attestation:  The scribes documentation has been prepared under my direction and personally reviewed by me in its entirety. I confirm that the note above accurately reflects all work, treatment, procedures, and medical decision making performed by me.

## 2020-07-27 NOTE — PATIENT INSTRUCTIONS
heart.  · Find out if a heart defect is present and how severe it is. · Find out how well the heart valves work. How is the test done? · You will get medicine to help you relax. · A thin tube called a catheter is put into a blood vessel in the groin or the arm. The doctor moves the catheter through the blood vessel into your heart. · You will get a shot to numb the skin where the catheter goes in. · Dye may be injected into your heart. Your doctor can watch on special monitors as the dye moves in your heart. The dye helps your doctor see blood flow in your heart. · If a heart defect is found, cardiac catheterization sometimes is used to correct it during the test.  · You will stay in a room for at least a few hours to make sure the catheter site starts to heal. You may have a bandage or a compression device on your groin or arm to prevent bleeding. · If the catheter was placed in your groin, you may lie in bed for a few hours. If the catheter was put in your arm, you will need to keep your arm still for at least 1 hour. How long does it take? The test will take about 30 minutes. If a problem is found and the doctor treats it, the test can take a few hours longer. What happens after the test?  · You may or may not need to stay in the hospital overnight. You will get more instructions for what to do at home. · Drink plenty of fluids for several hours after the test.  Follow-up care is a key part of your treatment and safety. Be sure to make and go to all appointments, and call your doctor if you are having problems. It's also a good idea to know your test results and keep a list of the medicines you take. Where can you learn more? Go to https://ZenPayrollbuddySummize.Cognii. org and sign in to your A[employer]on account.  Enter W306 in the [employer] box to learn more about \"Left Heart Catheterization: About This Test.\"     If you do not have an account, please click on the \"Sign Up Now\" before your procedure. Make sure that you understand exactly what your doctor wants you to do. These medicines increase the risk of bleeding. · Make sure your doctor and the hospital have a copy of your advance directive. If you don't have one, you may want to prepare one. It lets others know your health care wishes. It's a good thing to have before any type of surgery or procedure. How is the test done? · The test is done in a hospital, often in a cardiac catheterization laboratory (\"cath lab\"). You lie on a table under a large X-ray machine. · You will get medicine through an IV in one of your veins to help you relax. · You will be awake during the procedure, but you may not be able to remember much about it. The doctor will inject some medicine to numb the skin where the catheter will be put in. You will feel a small needle stick, like having a blood test. You may feel some pressure when the doctor puts in the catheter. · The doctor may look at X-ray pictures on a monitor (like a TV screen) to move the catheter to your heart and lungs. · The catheter will be removed. A nurse may press on a bandage on the opening to prevent bleeding. · After the test, you will stay in a room for at least a few hours to make sure the catheter site starts to heal. You may have a bandage or a compression device on the catheter site to prevent bleeding. · If the catheter was placed in your neck or arm, you may sit up in your bed. If the catheter was placed in your groin, you may lie in bed for a few hours. How long does the test take? The procedure will probably take about 1 to 2 hours. What happens after the test?  You may be able to go home later the same day. Or you might need to stay in the hospital overnight. Follow-up care is a key part of your treatment and safety. Be sure to make and go to all appointments, and call your doctor if you are having problems.  It's also a good idea to keep a list of the medicines you take. Ask your doctor when you can expect to have your test results. Where can you learn more? Go to https://chpepiceweb.Ca[employer]ic Graphics. org and sign in to your PsychologyOnline account. Enter H902 in the [facility] box to learn more about \"Right Heart Catheterization: About This Test.\"     If you do not have an account, please click on the \"Sign Up Now\" link. Current as of: December 16, 2019               Content Version: 12.5  © 2006-2020 [employer]. Care instructions adapted under license by [facility] ([facility]). If you have questions about a medical condition or this instruction, always ask your healthcare professional. [address] any warranty or liability for your use of this information. Patient Education        Learning About Percutaneous Coronary Intervention  What is percutaneous coronary intervention? Percutaneous coronary intervention (PCI) is the name for procedures to open a blocked coronary artery. The two most common are coronary angioplasty and coronary stent placement. A PCI is a way to open a blocked coronary artery before, during, or after a heart attack. It gets blood flowing to your heart. And it can help prevent heart problems by widening an artery that has been narrowed by fatty buildup (plaque). This also may be called balloon angioplasty. Before a PCI, a doctor does a test to find blocked arteries. The test is called cardiac catheterization. The doctor puts a tiny tube called a catheter into an artery in your groin or arm. The doctor moves the catheter through the artery to your heart. Then he or she puts a dye into the catheter. This makes your heart's arteries show up on a screen so the doctor can see any blockages. The test also can measure the pressure inside your heart's chambers. If you have a blocked artery, the doctor may do an angioplasty or a coronary stent procedure.  In an angioplasty, the doctor uses a catheter with a tiny balloon at the tip. He or she puts it into the blocked area and inflates it. The balloon presses the plaque against the walls of the artery. This makes more room for blood to flow. In most cases, the doctor then puts a stent in the artery. A stent is a small, expandable tube that presses against the walls of the artery. The stent is left in the artery to keep the artery open. This helps blood flow. It also may keep small pieces of plaque from breaking off and causing a heart attack. How is PCI done? PCI is done in a cardiac catheterization laboratory (\"cath lab\"). It is done by a heart specialist called a cardiologist. The whole procedure may take 1½ to 3 hours. You lie on a table under a large X-ray machine. You will get medicine through an IV in one of your veins. It helps you relax and not feel pain. You will be awake during the procedure. But you may not be able to remember much about it. The doctor will inject some medicine into your arm or groin to numb the skin. You will feel a small needle stick. It's like having a blood test. You may feel some pressure when the doctor puts in the catheter. But you will not feel pain. The doctor will look at X-ray pictures on a monitor (like a TV set) to move the catheter to your heart. You may feel warm or flushed for a short time when the doctor injects dye into your artery. The doctor then will inflate a tiny balloon at the end of the catheter. The balloon is inflated for a brief time. Then it is deflated and removed. The doctor also may use the catheter to put a stent in the artery. What can you expect after PCI? The catheter will be removed. A nurse or doctor may press on a bandage on the opening. Then a bandage or a compression device may be placed on your groin or wrist at the catheter insertion site. This prevents bleeding.  After the test, you will be taken to a room where the catheter site and your heart rate, blood pressure, and temperature will be checked several times. If the catheter was put in your groin, you will need to lie still and keep your leg straight for several hours. If the catheter was put in your arm, you may be able to sit up and get out of bed right away. But you will need to keep your arm still for at least one hour. You may stay 1 night in the hospital. When you go home, you will get instructions from your doctor to help you recover well and prevent problems. Make sure to drink plenty of fluids (unless your doctor tells you not to) for several hours after the test. This will help flush the dye out of your body. Your doctor will prescribe blood-thinning medicines. You will likely take aspirin plus another blood thinner. It is very important that you take these medicines exactly as directed. They help keep the coronary artery open and reduce your risk of a heart attack. If you have this procedure, you will still need to make lifestyle changes like eating healthy, being active, and not smoking. This will give you the best chance for a longer, healthier life. Follow-up care is a key part of your treatment and safety. Be sure to make and go to all appointments, and call your doctor if you are having problems. It's also a good idea to know your test results and keep a list of the medicines you take. Where can you learn more? Go to https://GoNabit.[employer]. org and sign in to your [employer] account. Enter O032 in the [facility] box to learn more about \"Learning About Percutaneous Coronary Intervention. \"     If you do not have an account, please click on the \"Sign Up Now\" link. Current as of: December 16, 2019               Content Version: 12.5  © 9447-3520 Healthwise, Incorporated. Care instructions adapted under license by [facility] ([facility]).  If you have questions about a medical condition or this instruction, always ask your healthcare professional. [address] any warranty or liability for your use of this information. Patient Education        Angina: Care Instructions  Your Care Instructions     You have a problem called angina. Angina happens when there is not enough blood flow to your heart muscle. Angina is a sign of coronary artery disease (CAD). CAD occurs when blood vessels that supply the heart become narrowed. Having CAD increases your risk of a heart attack. Chest pain or pressure is the most common symptom of angina. But some people have other symptoms, like:  · Pain, pressure, or a strange feeling in the back, neck, jaw, or upper belly, or in one or both shoulders or arms. · Shortness of breath. · Nausea or vomiting. · Lightheadedness or sudden weakness. · Fast or irregular heartbeat. Women are somewhat more likely than men to have angina symptoms like shortness of breath, nausea, and back or jaw pain. Angina can be dangerous. That's why it is important to pay attention to your symptoms. Know what is typical for you, learn how to control your symptoms, and understand when you need to get treatment. A change in your usual pattern of symptoms is an emergency. It may mean that you are having a heart attack. The doctor has checked you carefully, but problems can develop later. If you notice any problems or new symptoms, get medical treatment right away. Follow-up care is a key part of your treatment and safety. Be sure to make and go to all appointments, and call your doctor if you are having problems. It's also a good idea to know your test results and keep a list of the medicines you take. How can you care for yourself at home? Medicines  · If your doctor has given you nitroglycerin for angina symptoms, keep it with you at all times. If you have symptoms, sit down and rest, and take the first dose of nitroglycerin as directed. If your symptoms get worse or are not getting better within 5 minutes, call 911 right away. Stay on the phone.  The emergency  will give you further instructions. · If your doctor advises it, take 1 low-dose aspirin a day to prevent heart attack. · Be safe with medicines. Take your medicines exactly as prescribed. Call your doctor if you think you are having a problem with your medicine. You will get more details on the specific medicines your doctor prescribes. Lifestyle changes  · Do not smoke. If you need help quitting, talk to your doctor about stop-smoking programs and medicines. These can increase your chances of quitting for good. · Eat a heart-healthy diet that is low in saturated fat and salt, and is high in fiber. Talk to your doctor or a dietitian about healthy eating. · Stay at a healthy weight. Or lose weight if you need to. Activity  · Talk to your doctor about a level of activity that is safe for you. · If an activity causes angina symptoms, stop and rest.  When should you call for help? [id number] anytime you think you may need emergency care. For example, call if:  · You passed out (lost consciousness). · You have symptoms of a heart attack. These may include:  ? Chest pain or pressure, or a strange feeling in the chest.  ? Sweating. ? Shortness of breath. ? Nausea or vomiting. ? Pain, pressure, or a strange feeling in the back, neck, jaw, or upper belly or in one or both shoulders or arms. ? Lightheadedness or sudden weakness. ? A fast or irregular heartbeat. After you call 911, the  may tell you to chew 1 adult-strength or 2 to 4 low-dose aspirin. Wait for an ambulance. Do not try to drive yourself. · You have angina symptoms that do not go away with rest or are not getting better within 5 minutes after you take a dose of nitroglycerin. Call your doctor now if:  · Your angina symptoms seem worse but still follow your typical pattern. You can predict when symptoms will happen, but they may come on sooner, feel worse, or last longer. · You feel dizzy or lightheaded, or you feel like you may faint.   Watch closely for changes in your health, and be sure to contact your doctor if you have any problems. Where can you learn more? Go to https://chpepiceweb.[employer]. org and sign in to your [employer]al account. Enter H129 in the [employer] box to learn more about \"Angina: Care Instructions. \"     If you do not have an account, please click on the \"Sign Up Now\" link. Current as of: December 16, 2019               Content Version: 12.5  © 0462-8805 Healthwise, Incorporated. Care instructions adapted under license by [facility] ([facility]). If you have questions about a medical condition or this instruction, always ask your healthcare professional. [address] any warranty or liability for your use of this information.

## 2020-07-28 ENCOUNTER — TELEPHONE (OUTPATIENT)
Dept: CARDIOLOGY CLINIC | Age: 60
End: 2020-07-28

## 2020-08-03 ENCOUNTER — OFFICE VISIT (OUTPATIENT)
Dept: PRIMARY CARE CLINIC | Age: 60
End: 2020-08-03

## 2020-08-03 DIAGNOSIS — I10 ESSENTIAL HYPERTENSION: ICD-10-CM

## 2020-08-03 DIAGNOSIS — R06.09 DOE (DYSPNEA ON EXERTION): ICD-10-CM

## 2020-08-03 DIAGNOSIS — R07.9 CHEST PAIN, UNSPECIFIED TYPE: ICD-10-CM

## 2020-08-03 LAB
ANION GAP SERPL CALCULATED.3IONS-SCNC: 16 MMOL/L (ref 3–16)
BUN BLDV-MCNC: 6 MG/DL (ref 7–20)
CALCIUM SERPL-MCNC: 9.2 MG/DL (ref 8.3–10.6)
CHLORIDE BLD-SCNC: 103 MMOL/L (ref 99–110)
CO2: 19 MMOL/L (ref 21–32)
CREAT SERPL-MCNC: 0.8 MG/DL (ref 0.8–1.3)
GFR AFRICAN AMERICAN: >60
GFR NON-AFRICAN AMERICAN: >60
GLUCOSE BLD-MCNC: 95 MG/DL (ref 70–99)
HCT VFR BLD CALC: 40.6 % (ref 40.5–52.5)
HEMOGLOBIN: 13.8 G/DL (ref 13.5–17.5)
MCH RBC QN AUTO: 33.9 PG (ref 26–34)
MCHC RBC AUTO-ENTMCNC: 33.9 G/DL (ref 31–36)
MCV RBC AUTO: 99.9 FL (ref 80–100)
PDW BLD-RTO: 14.2 % (ref 12.4–15.4)
PLATELET # BLD: 291 K/UL (ref 135–450)
PMV BLD AUTO: 8.3 FL (ref 5–10.5)
POTASSIUM SERPL-SCNC: 4 MMOL/L (ref 3.5–5.1)
RBC # BLD: 4.06 M/UL (ref 4.2–5.9)
SODIUM BLD-SCNC: 138 MMOL/L (ref 136–145)
WBC # BLD: 5 K/UL (ref 4–11)

## 2020-08-03 NOTE — PROGRESS NOTES
Patient presented to [facility] drive up clinic for preop testing. Patient was swabbed and given information advising them to remain isolated until procedure date.

## 2020-08-04 LAB — SARS-COV-2, NAA: NOT DETECTED

## 2020-08-05 NOTE — RESULT ENCOUNTER NOTE
Your test for COVID-19, also known as novel coronavirus, came back negative. No virus was detected from the sample collected. Testing is not 100%. Until your symptoms are fully resolved, you may still be contagious. We recommend that you remain isolated for 7 days minimum or 72 hours after your symptoms have completely resolved, whichever is longer. If you were exposed to a known positive COVID-19 patient, then you must remain isolated for 14 days. If you were tested for a pre-op, then you remain in isolated until your procedure. Continually monitor symptoms. Contact a medical provider if symptoms are worsening. If you have any additional questions, contact your PCP.     For additional information, please visit the Centers for Disease Control and Prevention [email or web address]

## 2020-08-07 ENCOUNTER — HOSPITAL ENCOUNTER (OUTPATIENT)
Dept: CARDIAC CATH/INVASIVE PROCEDURES | Age: 60
Discharge: HOME | End: 2020-08-07
Payer: COMMERCIAL

## 2020-08-07 VITALS
HEART RATE: 78 BPM | SYSTOLIC BLOOD PRESSURE: 121 MMHG | DIASTOLIC BLOOD PRESSURE: 72 MMHG | OXYGEN SATURATION: 96 % | HEIGHT: 67 IN | WEIGHT: 148 LBS | TEMPERATURE: 98.6 F | BODY MASS INDEX: 23.23 KG/M2 | RESPIRATION RATE: 10 BRPM

## 2020-08-07 PROCEDURE — C1894 INTRO/SHEATH, NON-LASER: HCPCS

## 2020-08-07 PROCEDURE — C1887 CATHETER, GUIDING: HCPCS

## 2020-08-07 PROCEDURE — 2709999900 HC NON-CHARGEABLE SUPPLY

## 2020-08-07 PROCEDURE — 93458 L HRT ARTERY/VENTRICLE ANGIO: CPT

## 2020-08-07 PROCEDURE — 99152 MOD SED SAME PHYS/QHP 5/>YRS: CPT

## 2020-08-07 PROCEDURE — C1769 GUIDE WIRE: HCPCS

## 2020-08-07 PROCEDURE — 2580000003 HC RX 258

## 2020-08-07 PROCEDURE — 6360000004 HC RX CONTRAST MEDICATION: Performed by: INTERNAL MEDICINE

## 2020-08-07 PROCEDURE — 2500000003 HC RX 250 WO HCPCS

## 2020-08-07 PROCEDURE — 6360000002 HC RX W HCPCS

## 2020-08-07 PROCEDURE — 93458 L HRT ARTERY/VENTRICLE ANGIO: CPT | Performed by: INTERNAL MEDICINE

## 2020-08-07 RX ORDER — SODIUM CHLORIDE 0.9 % (FLUSH) 0.9 %
10 SYRINGE (ML) INJECTION PRN
Status: DISCONTINUED | OUTPATIENT
Start: 2020-08-07 | End: 2020-08-07 | Stop reason: SDUPTHER

## 2020-08-07 RX ORDER — ACETAMINOPHEN 325 MG/1
650 TABLET ORAL EVERY 4 HOURS PRN
Status: DISCONTINUED | OUTPATIENT
Start: 2020-08-07 | End: 2020-08-08 | Stop reason: HOSPADM

## 2020-08-07 RX ORDER — SODIUM CHLORIDE 9 MG/ML
INJECTION, SOLUTION INTRAVENOUS CONTINUOUS
Status: DISCONTINUED | OUTPATIENT
Start: 2020-08-07 | End: 2020-08-08 | Stop reason: HOSPADM

## 2020-08-07 RX ORDER — SODIUM CHLORIDE 0.9 % (FLUSH) 0.9 %
10 SYRINGE (ML) INJECTION EVERY 12 HOURS SCHEDULED
Status: DISCONTINUED | OUTPATIENT
Start: 2020-08-07 | End: 2020-08-07 | Stop reason: SDUPTHER

## 2020-08-07 RX ORDER — SODIUM CHLORIDE 0.9 % (FLUSH) 0.9 %
10 SYRINGE (ML) INJECTION EVERY 12 HOURS SCHEDULED
Status: DISCONTINUED | OUTPATIENT
Start: 2020-08-07 | End: 2020-08-08 | Stop reason: HOSPADM

## 2020-08-07 RX ORDER — ONDANSETRON 2 MG/ML
4 INJECTION INTRAMUSCULAR; INTRAVENOUS EVERY 6 HOURS PRN
Status: DISCONTINUED | OUTPATIENT
Start: 2020-08-07 | End: 2020-08-08 | Stop reason: HOSPADM

## 2020-08-07 RX ORDER — ASPIRIN 325 MG
325 TABLET ORAL ONCE
Status: DISCONTINUED | OUTPATIENT
Start: 2020-08-07 | End: 2020-08-08 | Stop reason: HOSPADM

## 2020-08-07 RX ORDER — SODIUM CHLORIDE 0.9 % (FLUSH) 0.9 %
10 SYRINGE (ML) INJECTION PRN
Status: DISCONTINUED | OUTPATIENT
Start: 2020-08-07 | End: 2020-08-08 | Stop reason: HOSPADM

## 2020-08-07 RX ADMIN — IOPAMIDOL 68 ML: 755 INJECTION, SOLUTION INTRAVENOUS at 13:51

## 2020-08-07 NOTE — H&P
Substance Use Topics    Alcohol use: Yes       Frequency: Never       Comment: every once in awhile    Drug use: Yes       Types: Marijuana       Comment: occ        No Known Allergies  Current Facility-Administered Medications          Current Outpatient Medications   Medication Sig Dispense Refill    aspirin 81 MG chewable tablet Take 1 tablet by mouth daily 30 tablet 3    atorvastatin (LIPITOR) 20 MG tablet Take 1 tablet by mouth nightly 30 tablet 3    amLODIPine (NORVASC) 10 MG tablet Take 1 tablet by mouth daily 30 tablet 3      No current facility-administered medications for this visit.            Review of Systems:  · Constitutional: no unanticipated weight loss. There's been no change in energy level, sleep pattern, or activity level. No fevers, chills. · Eyes: No visual changes or diplopia. No scleral icterus. · ENT: No Headaches, hearing loss or vertigo. No mouth sores or sore throat. · Cardiovascular: as reviewed in HPI  · Respiratory: No cough or wheezing, no sputum production. No hematemesis. · Gastrointestinal: No abdominal pain, appetite loss, blood in stools. No change in bowel or bladder habits. · Genitourinary: No dysuria, trouble voiding, or hematuria. · Musculoskeletal:  No gait disturbance, no joint complaints. · Integumentary: No rash or pruritis. · Neurological: No headache, diplopia, change in muscle strength, numbness or tingling. · Psychiatric: No anxiety or depression. · Endocrine: No temperature intolerance. No excessive thirst, fluid intake, or urination. No tremor. · Hematologic/Lymphatic: No abnormal bruising or bleeding, blood clots or swollen lymph nodes.   · Allergic/Immunologic: No nasal congestion or hives.     Physical Exam:   /76   Pulse 76   Temp 98.1 °F (36.7 °C)   Ht 5' 7\" (1.702 m)   Wt 151 lb (68.5 kg) Comment: with shoes  SpO2 98%   BMI 23.65 kg/m²       Wt Readings from Last 3 Encounters:   07/27/20 151 lb (68.5 kg)   07/11/20 153 lb 14.1 oz (69.8 kg)   06/05/20 146 lb 9.7 oz (66.5 kg)     Constitutional: He is oriented to person, place, and time. He appears well-developed and well-nourished. In no acute distress. Head: Normocephalic and atraumatic. Pupils equal and round. Neck: Neck supple. No JVP or carotid bruit appreciated. No mass and no thyromegaly present. No lymphadenopathy present. Cardiovascular: Normal rate. Normal heart sounds. Exam reveals no gallop and no friction rub. No murmur heard. Pulmonary/Chest: Effort normal and breath sounds normal. No respiratory distress. He has no wheezes, rhonchi or rales. Abdominal: Soft, non-tender. Bowel sounds are normal. He exhibits no organomegaly, mass or bruit. Extremities: No edema, cyanosis, or clubbing. Pulses are 2+ radial/dorsalis pedis/2+ posterior tibial/carotid bilaterally. Neurological: No gross cranial nerve deficit. Coordination normal.   Skin: Skin is warm and dry. There is no rash or diaphoresis. Psychiatric: He has a normal mood and affect. His speech is normal and behavior is normal.      Lab Review:    No results found for: TRIG, HDL, LDLCALC, LDLDIRECT, LABVLDL            Lab Results   Component Value Date      07/11/2020           Lab Results   Component Value Date     HGB 12.6 07/11/2020     HCT 36.6 07/11/2020           Lab Results   Component Value Date      07/11/2020            Lab Results   Component Value Date     K 3.8 07/11/2020     K 3.7 06/05/2020           Lab Results   Component Value Date     BUN 7 07/11/2020     CREATININE 0.8 07/11/2020        EKG Interpretation: 7/27/20 SR      Image Review:      CT chest wo contrast: 6/4/20  . No acute abnormality in the chest.    2. 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     3. Severe coronary artery disease         Echo: 6/4/20  Summary   Left ventricular cavity size is normal.   Normal left ventricular wall thickness.   Ejection fraction is visually estimated to be 55-60%.  Mitral valve leaflets appear mildly thickened.   Trivial mitral regurgitation.   Trivial tricuspid regurgitation.     Stress study: 6/4/20  Summary     Pharmacological Stress/MPI Results:          1. Technically a satisfactory study.     2. Normal pharmacological stress portion of the study.     3. No evidence of Ischemia by Myocardial Perfusion Imaging.     4. Gated Study shows normal LV size and Systolic function; EF is 67 %.          Assessment/Plan:      Coronary Calcification   Shortness of breath with exertion with stabbing chest pain. He is known to me as I evaluated him during his recent admission back in June. At that time, he had an abnormal severe coronary artery calcification per his CT Chest followed by [facility] Stress Test and Echocardiogram which were within acceptable limits. labs again reviewed. Today, he continues to complain of SOBE and chest pain brought on with any type of exertion, worsens with moderate-heavy exertion at work. He also has nocturnal SOB with wheezing. Even though his stress test showed no reversible ischemia, with his worsening symptoms of exertional shortness of breath and extensive coronary calcification seen on the prior cardiac CT I am concerned about significant underlying atherosclerotic heart disease. medical therapy at this time includes aspirini, lipitor and norvasc. I would like to proceed with a left and right heart cath to further assess for ischemic heart disease and   No iodine allergy, no medications need held prior. I had a detail discussion with the patient and the family members regarding the risk and benefit of the procedures. I explained to them the details of the procedure. I explained to them that the procedure will be performed using moderate sedation and local anaesthesia using lidocaine. I explained any risk of bleeding, perforation of the vessel, stroke, myocardial infarction or death.  The patient and the family members understood the risk and benefits and the details of procedure and would like to go ahead with the procedure. The patient gave informed consent.      Hypertension, essential  Controlled on medical therapy. Smoking addiction  Smoking 1-1.5 ppd or every 2 days. I have stressed the importance of working towards smoking cessation and spent 2 minutes discussing.      LLE discoloration   Isolated event per patient with leg turning purple. Distal pulses 2+ palpable today and LLE wnl.      We will see him back in follow up post procedure   Complexity of medical decision making-very high     Thank you very much for allowing me to participate in the care of your patient.  Please do not hesitate to contact me if you have any questions.     Sincerely,  Carlton Hull MD        [address] [place] [address]  Ph: [phone]  Fax: [phone]     This note was scribed in the presence of Dr. Kameron Curiel

## 2020-08-07 NOTE — PRE SEDATION
Brief Pre-Op Note/Sedation Assessment      Lane Arnold Sr.  1960  Room/bed info not found      [id number]  1:20 PM    Planned Procedure: Cardiac Catheterization Procedure    Post Procedure Plan: Return to same level of care    Consent: I have discussed with the patient and/or the patient representative the indication, alternatives, and the possible risks and/or complications of the planned procedure and the anesthesia methods. The patient and/or patient representative appear to understand and agree to proceed. Chief Complaint: Chest Pain/Pressure      Indications for Cath Procedure: Worsening Angina  Anginal Classification within 2 weeks:  CCS III - Symptoms with everyday living activities, i.e., moderate limitation  NYHA Heart Failure Class within 2 weeks: Class III - Symptoms of HF on less-than-ordinary exertion, Newly Diagnosed? Yes, Heart Failure Type: Diastolic  Is Cath Lab Visit Valve-related?: No  Surgical Risk: Intermediate  Functional Type: Unknown    Anti- Anginal Meds within 2 weeks:   Yes: Ca Channel Blockers, Aspirin and Statin (Any)    Stress or Imaging Studies Performed:  Stress Test with SPECT Result: Negative Risk/Extent of Ischemia:  Intermediate     Vital Signs:  /72   Pulse 78   Temp 98.6 °F (37 °C) (Oral)   Resp 10   Ht 5' 7\" (1.702 m)   Wt 148 lb (67.1 kg)   SpO2 96%   BMI 23.18 kg/m²     Allergies:  No Known Allergies    Past Medical History:  Past Medical History:   Diagnosis Date    Hypertension          Surgical History:  History reviewed. No pertinent surgical history.       Medications:  Current Outpatient Medications   Medication Sig Dispense Refill    aspirin 81 MG chewable tablet Take 1 tablet by mouth daily 30 tablet 3    atorvastatin (LIPITOR) 20 MG tablet Take 1 tablet by mouth nightly 30 tablet 3    amLODIPine (NORVASC) 10 MG tablet Take 1 tablet by mouth daily 30 tablet 3     Current Facility-Administered Medications   Medication Dose Route Frequency Provider Last Rate Last Dose    0.9 % sodium chloride infusion   Intravenous Continuous Sonny Gomez MD        sodium chloride flush 0.9 % injection 10 mL  10 mL Intravenous 2 times per day Sonny Gomez MD        sodium chloride flush 0.9 % injection 10 mL  10 mL Intravenous PRN Sonny Gomez MD        aspirin tablet 325 mg  325 mg Oral Once Jayy Roberts MD               Pre-Sedation:    Pre-Sedation Documentation and Exam:  I have personally completed a history, physical exam & review of systems for this patient (see notes). Prior History of Anesthesia Complications:   none    Modified Mallampati:  I (soft palate, uvula, fauces, tonsillar pillars visible)    ASA Classification:  Class 3 - A patient with severe systemic disease that limits activity but is not incapacitating      Sydni Scale: Activity:  2 - Able to move 4 extremities voluntarily on command  Respiration:  2 - Able to breathe deeply and cough freely  Circulation:  2 - BP+/- 20mmHg of normal  Consciousness:  2 - Fully awake  Oxygen Saturation (color):  2 - Able to maintain oxygen saturation >92% on room air    Sedation/Anesthesia Plan:  Guard the patient's safety and welfare. Minimize physical discomfort and pain. Minimize negative psychological responses to treatment by providing sedation and analgesia and maximize the potential amnesia. Patient to meet pre-procedure discharge plan.     Medication Planned:  midazolam intravenously and fentanyl intravenously    Patient is an appropriate candidate for plan of sedation: yes      Electronically signed by Lakeshia Tam MD on 8/7/2020 at 1:20 PM

## 2020-08-07 NOTE — LETTER
[facility] Cath Lab  [address] [address]  Phone: [phone]             August 7, 2020    Patient: Omega Womack Sr. YOB: 1960   Date of Visit: 8/7/2020       To Whom It May Concern:    Omega Womack was seen and treated in our facility  beginning 8/7/2020 until August 7th, 2020. He may return to work on Tuesday August 11th, 2020. For any questions please call [facility] at [phone].        Sincerely,       Rm Germain RN         Signature:__________________________________

## 2020-08-07 NOTE — PROCEDURES
[address] [place] [address]                            CARDIAC CATHETERIZATION    PATIENT NAME: Kendall Eduardo                    :        1960  MED REC NO:   [id number]                          ROOM:  ACCOUNT NO:   [de-identified]                           ADMIT DATE: 2020  PROVIDER:     Marisela Ivy MD    DATE OF PROCEDURE:  2020    PROCEDURE NOTE:  The patient was explained benefits and risks of the  procedure. Informed consent was obtained. After performing Joseph test,  the patient's right radial artery was punctured using Seldinger  technique. A 6-Angolan sheath was placed in the right radial artery. The patient was given intraarterial heparin, verapamil and  nitroglycerin. Coronary angiography was performed with the help of  5-Angolan Tiger and 5-Angolan 3DRC catheters. Multiple views of the left  and the right coronary arteries were obtained before and after the  ventriculogram.  No gradient across the aortic valve noted. The patient  received fluid bolus due to low EDP during the procedure. Estimated blood loss is less than 50 mL. The patient received 2 mg of  Versed and 100 mcg of fentanyl during the procedure. HEMODYNAMIC DATA:  Please see the computerized printout. CORONARY ANGIOGRAPHY FINDINGS:  1. Left main trunk: It arises from the left sinus of Valsalva. It has  moderate to heavy calcification. The left main trunk divides into the  left anterior descending artery and left circumflex artery. 2.  Left anterior descending artery: It is a moderate-sized artery. It  descends into the interventricular sulcus and wraps around the apex of  the heart. Left anterior descending artery also had evidence of  moderate to heavy calcification in the proximal and the mid portion of  the artery but there are no significant stenosis noted.   The diagonal  branch of the LAD appears to have a 30% blockage. The remainder of the  left anterior descending artery is free of atherosclerosis. 3.  Left circumflex artery: It arises from the left main trunk. It is  a moderate-sized artery. It gives rise to moderate-sized obtuse  marginal branches. Left circumflex artery also has a mild calcification  in the proximal portion of the artery but there are no hemodynamically  significant stenoses noted. 4.  Right coronary artery: It arises from the right sinus of Valsalva. It is a moderate-sized artery. It appears to be a codominant artery. It was not selectively cannulated but appears widely patent except maybe  30% stenosis in the distal portion of the posterior descending artery  branch. 5.  Left ventriculogram reveals a normal left ventricular systolic  function with estimated EF of 55% to 60%. IMPRESSION:  1. Moderate to heavy calcification of the left main trunk which is  widely patent. 2.  Mild to moderate calcification of the proximal and the mid LAD with  patent LAD and 30% diagonal stenosis. 3.  Patent circumflex artery and its branches. 4.  A 30% stenosis of the distal right coronary artery with evidence of  mild calcification. 5.  Preserved left ventricular systolic function with normal left heart  hemodynamics. In view of the above findings, we will consider the patient's pain to be  noncardiac in origin and look for other etiologies for the patient's  chest pain but continue with risk factor modification.         Romana Bias, MD    D: 08/07/2020 14:17:27       T: 08/07/2020 16:46:07     [email or web address]  Job#: [id number]     Doc#: [id number]    CC:  Henry Quezada MD       Primary Care Physician

## 2021-06-18 ENCOUNTER — OFFICE VISIT (OUTPATIENT)
Dept: FAMILY MEDICINE CLINIC | Age: 61
End: 2021-06-18
Payer: COMMERCIAL

## 2021-06-18 VITALS
HEART RATE: 87 BPM | HEIGHT: 67 IN | DIASTOLIC BLOOD PRESSURE: 60 MMHG | OXYGEN SATURATION: 97 % | WEIGHT: 149 LBS | SYSTOLIC BLOOD PRESSURE: 110 MMHG | BODY MASS INDEX: 23.39 KG/M2

## 2021-06-18 DIAGNOSIS — E04.9 THYROID GOITER: ICD-10-CM

## 2021-06-18 DIAGNOSIS — R06.02 SHORTNESS OF BREATH: ICD-10-CM

## 2021-06-18 DIAGNOSIS — I10 ESSENTIAL HYPERTENSION: ICD-10-CM

## 2021-06-18 DIAGNOSIS — Z72.0 TOBACCO ABUSE: ICD-10-CM

## 2021-06-18 DIAGNOSIS — Z11.59 NEED FOR HEPATITIS C SCREENING TEST: ICD-10-CM

## 2021-06-18 DIAGNOSIS — F10.10 ALCOHOL ABUSE: ICD-10-CM

## 2021-06-18 DIAGNOSIS — I25.10 CORONARY ARTERY DISEASE INVOLVING NATIVE CORONARY ARTERY OF NATIVE HEART WITHOUT ANGINA PECTORIS: Primary | ICD-10-CM

## 2021-06-18 DIAGNOSIS — I25.10 CORONARY ARTERY DISEASE INVOLVING NATIVE CORONARY ARTERY OF NATIVE HEART WITHOUT ANGINA PECTORIS: ICD-10-CM

## 2021-06-18 LAB
A/G RATIO: 1.8 (ref 1.1–2.2)
ALBUMIN SERPL-MCNC: 4.6 G/DL (ref 3.4–5)
ALP BLD-CCNC: 134 U/L (ref 40–129)
ALT SERPL-CCNC: 95 U/L (ref 10–40)
ANION GAP SERPL CALCULATED.3IONS-SCNC: 15 MMOL/L (ref 3–16)
AST SERPL-CCNC: 186 U/L (ref 15–37)
BASOPHILS ABSOLUTE: 0 K/UL (ref 0–0.2)
BASOPHILS RELATIVE PERCENT: 1 %
BILIRUB SERPL-MCNC: 0.8 MG/DL (ref 0–1)
BUN BLDV-MCNC: 8 MG/DL (ref 7–20)
CALCIUM SERPL-MCNC: 9.3 MG/DL (ref 8.3–10.6)
CHLORIDE BLD-SCNC: 101 MMOL/L (ref 99–110)
CHOLESTEROL, FASTING: 179 MG/DL (ref 0–199)
CO2: 22 MMOL/L (ref 21–32)
CREAT SERPL-MCNC: 0.7 MG/DL (ref 0.8–1.3)
EOSINOPHILS ABSOLUTE: 0.3 K/UL (ref 0–0.6)
EOSINOPHILS RELATIVE PERCENT: 7.4 %
FOLATE: 4.17 NG/ML (ref 4.78–24.2)
GFR AFRICAN AMERICAN: >60
GFR NON-AFRICAN AMERICAN: >60
GLOBULIN: 2.6 G/DL
GLUCOSE FASTING: 84 MG/DL (ref 70–99)
HCT VFR BLD CALC: 39.4 % (ref 40.5–52.5)
HDLC SERPL-MCNC: 89 MG/DL (ref 40–60)
HEMOGLOBIN: 13.5 G/DL (ref 13.5–17.5)
HEPATITIS C ANTIBODY INTERPRETATION: NORMAL
LDL CHOLESTEROL CALCULATED: 74 MG/DL
LYMPHOCYTES ABSOLUTE: 2 K/UL (ref 1–5.1)
LYMPHOCYTES RELATIVE PERCENT: 50.6 %
MCH RBC QN AUTO: 35.2 PG (ref 26–34)
MCHC RBC AUTO-ENTMCNC: 34.3 G/DL (ref 31–36)
MCV RBC AUTO: 102.8 FL (ref 80–100)
MONOCYTES ABSOLUTE: 0.5 K/UL (ref 0–1.3)
MONOCYTES RELATIVE PERCENT: 13.5 %
NEUTROPHILS ABSOLUTE: 1.1 K/UL (ref 1.7–7.7)
NEUTROPHILS RELATIVE PERCENT: 27.5 %
PDW BLD-RTO: 14.4 % (ref 12.4–15.4)
PLATELET # BLD: 233 K/UL (ref 135–450)
PMV BLD AUTO: 8.4 FL (ref 5–10.5)
POTASSIUM SERPL-SCNC: 4.2 MMOL/L (ref 3.5–5.1)
RBC # BLD: 3.84 M/UL (ref 4.2–5.9)
SODIUM BLD-SCNC: 138 MMOL/L (ref 136–145)
TOTAL PROTEIN: 7.2 G/DL (ref 6.4–8.2)
TRIGLYCERIDE, FASTING: 78 MG/DL (ref 0–150)
TSH REFLEX: 0.74 UIU/ML (ref 0.27–4.2)
VITAMIN B-12: 941 PG/ML (ref 211–911)
VLDLC SERPL CALC-MCNC: 16 MG/DL
WBC # BLD: 3.9 K/UL (ref 4–11)

## 2021-06-18 PROCEDURE — 99204 OFFICE O/P NEW MOD 45 MIN: CPT | Performed by: NURSE PRACTITIONER

## 2021-06-18 RX ORDER — NICOTINE 21 MG/24HR
1 PATCH, TRANSDERMAL 24 HOURS TRANSDERMAL EVERY 24 HOURS
Qty: 30 PATCH | Refills: 1 | Status: ON HOLD | OUTPATIENT
Start: 2021-06-18 | End: 2022-08-02

## 2021-06-18 RX ORDER — ATORVASTATIN CALCIUM 20 MG/1
20 TABLET, FILM COATED ORAL NIGHTLY
Qty: 30 TABLET | Refills: 3 | Status: SHIPPED | OUTPATIENT
Start: 2021-06-18 | End: 2021-10-28 | Stop reason: SDUPTHER

## 2021-06-18 ASSESSMENT — ENCOUNTER SYMPTOMS
BLOOD IN STOOL: 0
DIARRHEA: 0
COUGH: 1
SHORTNESS OF BREATH: 1
ABDOMINAL PAIN: 0
CONSTIPATION: 0
NAUSEA: 0
VOMITING: 0

## 2021-06-18 ASSESSMENT — PATIENT HEALTH QUESTIONNAIRE - PHQ9
2. FEELING DOWN, DEPRESSED OR HOPELESS: 1
SUM OF ALL RESPONSES TO PHQ9 QUESTIONS 1 & 2: 2
1. LITTLE INTEREST OR PLEASURE IN DOING THINGS: 1
SUM OF ALL RESPONSES TO PHQ QUESTIONS 1-9: 2

## 2021-06-18 NOTE — PROGRESS NOTES
2021    Lilibeth Abbasi Sr. (:  1960) is a 64 y.o. male, here for evaluation of the following medical concerns:  Chief Complaint   Patient presents with    New Patient     get established       HPI  Patient is here for a new patient visit. Reports that he does not have a previous PCP. Here with friend Yyaa Vo from [place] 6 years ago. HTN- prescribed amlodipine in the past, but ran out of medication. Patient originally tells me that he has stent in his heart. Charted reviewed and heart cath from 20 showed non obstructive CAD. Has not followed up with cardiologist Dr. Kathy Corona since. Was prescribed atorvastatin and ASA. He ran out of the atorvastatin but continues on ASA therapy. Positive for shortness of breath. Started around when he was in the hospital last year. Not able to walk long distances without getting the shortness of breath. Will get stabbing chest pains with walking long distances. He reports that this is chronic since last year. He denies worsening symptoms     Smoking 1/2 ppd for 50 years. Never tried to quit. Thyroid goiter- had u/s a year ago and never had f/u. He reports that he has been on disability for \"his heart\". Prior to disability worked in [employer]. 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 Insomnia- tries to go to bed at 930-10 PM. Will fall asleep at midnight and will wake up at 330 and will be up to 10 AM. Then will sleep for a couple of hours. Drinking 6 beers per day and 1 pint of vodka per day. Has one son who lives in [address] [place] [address]   Constitutional: Positive for fatigue. Negative for activity change and fever. Respiratory: Positive for cough and shortness of breath. Denies worsening from baseline    Cardiovascular: Positive for chest pain. Negative for leg swelling.         Denies worsening from baseline    Gastrointestinal: Negative for abdominal pain, blood in stool, constipation, diarrhea, nausea and vomiting. Neurological: Negative for dizziness and headaches. Psychiatric/Behavioral: Positive for sleep disturbance. Negative for dysphoric mood. The patient is not nervous/anxious. Prior to Visit Medications    Medication Sig Taking? Authorizing Provider   aspirin 81 MG chewable tablet Take 1 tablet by mouth daily Yes Chris Alegre MD   atorvastatin (LIPITOR) 20 MG tablet Take 1 tablet by mouth nightly  Patient not taking: Reported on 6/18/2021  Chris Alegre MD        No Known Allergies    Past Medical History:   Diagnosis Date    CAD (coronary artery disease)     Hypertension        No past surgical history on file. Social History     Socioeconomic History    Marital status: Single     Spouse name: Not on file    Number of children: Not on file    Years of education: Not on file    Highest education level: Not on file   Occupational History    Not on file   Tobacco Use    Smoking status: Current Every Day Smoker     Packs/day: 0.50     Types: Cigarettes    Smokeless tobacco: Never Used   Vaping Use    Vaping Use: Never used   Substance and Sexual Activity    Alcohol use: Yes     Comment: every once in awhile    Drug use: Yes     Types: Marijuana     Comment: occ    Sexual activity: Yes     Partners: Female   Other Topics Concern    Not on file   Social History Narrative    Not on file     Social Determinants of Health     Financial Resource Strain:     Difficulty of Paying Living Expenses:    Food Insecurity:     Worried About Running Out of Food in the Last Year:     Ran Out of Food in the Last Year:    Transportation Needs:     Lack of Transportation (Medical):      Lack of Transportation (Non-Medical):    Physical Activity:     Days of Exercise per Week:     Minutes of Exercise per Session:    Stress:     Feeling of Stress :    Social Connections:     Frequency of Communication with Friends and Family:     Frequency of Social Gatherings with Friends and Family:     Attends Tenriism Services:     Active Member of Clubs or Organizations:     Attends Club or Organization Meetings:     Marital Status:    Intimate Partner Violence:     Fear of Current or Ex-Partner:     Emotionally Abused:     Physically Abused:     Sexually Abused:         Family History   Problem Relation Age of Onset    Diabetes Mother     Liver Disease Father        Vitals:    06/18/21 1043   BP: 110/60   Pulse: 87   SpO2: 97%   Weight: 149 lb (67.6 kg)   Height: 5' 7\" (1.702 m)     Estimated body mass index is 23.34 kg/m² as calculated from the following:    Height as of this encounter: 5' 7\" (1.702 m). Weight as of this encounter: 149 lb (67.6 kg). Physical Exam  Vitals and nursing note reviewed. Constitutional:       General: He is not in acute distress. Appearance: He is well-developed. HENT:      Head: Normocephalic and atraumatic. Right Ear: Tympanic membrane, ear canal and external ear normal.      Left Ear: Tympanic membrane, ear canal and external ear normal.      Mouth/Throat:      Mouth: Mucous membranes are moist.      Dentition: Has dentures. Pharynx: Oropharynx is clear. Uvula midline. Neck:      Thyroid: Thyromegaly present. Cardiovascular:      Rate and Rhythm: Normal rate and regular rhythm. Heart sounds: Normal heart sounds. No murmur heard. No friction rub. No gallop. Pulmonary:      Effort: Pulmonary effort is normal. No respiratory distress. Breath sounds: Normal breath sounds. Abdominal:      Palpations: Abdomen is soft. Tenderness: There is no abdominal tenderness. Musculoskeletal:      Cervical back: Neck supple. Lymphadenopathy:      Cervical: No cervical adenopathy. Skin:     General: Skin is warm and dry. Neurological:      Mental Status: He is alert and oriented to person, place, and time. Psychiatric:         Behavior: Behavior normal.         Thought Content:  Thought content normal. Judgment: Judgment normal.         ASSESSMENT/PLAN:  1. Coronary artery disease involving native coronary artery of native heart without angina pectoris  Nonobstructive CAD noted on heart cath 8/2020. Has not had f/u since. Continues with chest pain and shortness of breath, but denies worsening symptoms. Will restart on atorvastatin. Continue ASA. Advised to have f/u with cardiologist.   - Sushant Mar MD, Cardiology, [facility]  - atorvastatin (LIPITOR) 20 MG tablet; Take 1 tablet by mouth nightly  Dispense: 30 tablet; Refill: 3  - Comprehensive Metabolic Panel, Fasting; Future  - Lipid, Fasting; Future    2. Essential hypertension  Controlled. Currently off amlodipine.   - Comprehensive Metabolic Panel, Fasting; Future  - Lipid, Fasting; Future    3. Shortness of breath  Chronic issue. Developed over a year ago. Denies worsening symptoms. Chest xray normal 7/2020.   - Full PFT Study With Bronchodilator  - CBC Auto Differential; Future    4. Thyroid goiter  -TSH  - US THYROID; Future  Previous u/s was recommended to have FNA-- he never completed. Will repeat u/s and refer to endocrinologist after results reviewed. 5. Tobacco abuse  Discussed the importance of smoking cessation. - nicotine (NICODERM CQ) 14 MG/24HR; Place 1 patch onto the skin every 24 hours  Dispense: 30 patch; Refill: 1  Advised to have pneumonia vaccine. 6. Alcohol abuse  Drinking 6 beers and 1 pint of vodka per day. Advised to decrease use. He is not interested in decreasing. Declines referral to rehab center. Advised counseling but he declined. - Vitamin B12 & Folate; Future  Discussed this is likely the issue for his sleeping problem. 7. Need for hepatitis C screening test  - Hepatitis C Antibody; Future    Patient reports that he had a colonoscopy that was normal in the past 3 years. He will get me the name of his GI so report can be obtained.      Return in about 4 weeks (around 7/16/2021), or if symptoms worsen or fail to improve, for chronic conditions. An  electronic signature was used to authenticate this note. --JAMMIE Haney CNP on 6/18/2021 at 10:54 AM    Total time spent on today's encounter was at least 50 minutes. This time included reviewing prior notes, reviewing prior testing when available, reviewing history obtained by medical assistant, performing history and physical exam, reviewing testing with patient when appropriate, counseling the patient, ordering medications and tests when appropriate, documentation in the electronic health record, and coordination of care.

## 2021-06-22 DIAGNOSIS — R74.8 ELEVATED LIVER ENZYMES: Primary | ICD-10-CM

## 2021-06-22 DIAGNOSIS — F10.10 ALCOHOL ABUSE: ICD-10-CM

## 2021-06-22 DIAGNOSIS — E53.8 FOLATE DEFICIENCY: ICD-10-CM

## 2021-06-22 RX ORDER — FOLIC ACID 1 MG/1
1 TABLET ORAL DAILY
Qty: 90 TABLET | Refills: 1 | Status: SHIPPED | OUTPATIENT
Start: 2021-06-22 | End: 2021-10-28 | Stop reason: SDUPTHER

## 2021-07-20 NOTE — PROGRESS NOTES
[facility]  Cardiology Progress Note    Eitan Gains Sr.  1960 July 21, 2021    CC: \"My SOB is getting worse. \"     HPI:  The patient is 64 y.o. male with a past medical history significant for HTN, smoking addiction who presents after presenting to the ED on 7/11/20 with recurrent CP. He had also been seen 6/4/20 for an overnight stay for CP, SOB with echo, Lexiscan stress and CT chest wo contrast upon my further evaluation during this hospitalization. 6/2020 normal stress and echo. 8/2020 L/RHC revealing normal pressures, non-obstructive CAD. Today, he reports worsening chronic GÓMEZ. Sharp pain in his back. He continues to smoke and drink daily. Patient denies exertional chest pain/pressure, PND, orthopnea, palpitations, lightheadedness, weight changes, changes in LE edema, and syncope. He takes medications off/on. He states PCP to perform lung tests. Past Medical History:   Diagnosis Date    CAD (coronary artery disease)     Hyperlipidemia     Hypertension      No past surgical history on file.   Family History   Problem Relation Age of Onset    Diabetes Mother     Liver Disease Father     Liver Disease Brother      Social History     Tobacco Use    Smoking status: Current Every Day Smoker     Packs/day: 0.50     Types: Cigarettes    Smokeless tobacco: Never Used   Vaping Use    Vaping Use: Never used   Substance Use Topics    Alcohol use: Yes     Comment: 6 beers and 1 pint of vodka per day    Drug use: Yes     Types: Marijuana     Comment: occ       No Known Allergies  Current Outpatient Medications   Medication Sig Dispense Refill    folic acid (FOLVITE) 1 MG tablet Take 1 tablet by mouth daily 90 tablet 1    atorvastatin (LIPITOR) 20 MG tablet Take 1 tablet by mouth nightly 30 tablet 3    nicotine (NICODERM CQ) 14 MG/24HR Place 1 patch onto the skin every 24 hours 30 patch 1    aspirin 81 MG chewable tablet Take 1 tablet by mouth daily 30 tablet 3     No current facility-administered medications for this visit. Review of Systems:  · Constitutional: no unanticipated weight loss. There's been no change in energy level, sleep pattern, or activity level. No fevers, chills. · Eyes: No visual changes or diplopia. No scleral icterus. · ENT: No Headaches, hearing loss or vertigo. No mouth sores or sore throat. · Cardiovascular: as reviewed in HPI  · Respiratory: No cough or wheezing, no sputum production. No hematemesis. · Gastrointestinal: No abdominal pain, appetite loss, blood in stools. No change in bowel or bladder habits. · Genitourinary: No dysuria, trouble voiding, or hematuria. · Musculoskeletal:  No gait disturbance, no joint complaints. · Integumentary: No rash or pruritis. · Neurological: No headache, diplopia, change in muscle strength, numbness or tingling. · Psychiatric: No anxiety or depression. · Endocrine: No temperature intolerance. No excessive thirst, fluid intake, or urination. No tremor. · Hematologic/Lymphatic: No abnormal bruising or bleeding, blood clots or swollen lymph nodes. · Allergic/Immunologic: No nasal congestion or hives. Physical Exam:   /78   Pulse 72   Ht 5' 7\" (1.702 m)   Wt 142 lb (64.4 kg) Comment: with shoes  SpO2 96%   BMI 22.24 kg/m²   Wt Readings from Last 3 Encounters:   07/21/21 142 lb (64.4 kg)   06/18/21 149 lb (67.6 kg)   08/07/20 148 lb (67.1 kg)     Constitutional: He is oriented to person, place, and time. He appears well-developed and well-nourished. In no acute distress. Head: Normocephalic and atraumatic. Pupils equal and round. Neck: Neck supple. No JVP or carotid bruit appreciated. No mass and no thyromegaly present. No lymphadenopathy present. Cardiovascular: Normal rate. Normal heart sounds. Exam reveals no gallop and no friction rub. No murmur heard. Pulmonary/Chest: Effort normal and breath sounds normal. No respiratory distress. He has  expiratory wheezes, no rhonchi or rales. Abdominal: Soft, non-tender. Bowel sounds are normal. He exhibits no organomegaly, mass or bruit. Extremities: No edema, cyanosis, or clubbing. Pulses are 2+ radial/dorsalis pedis/2+ posterior tibial/carotid bilaterally. Neurological: No gross cranial nerve deficit. Coordination normal.   Skin: Skin is warm and dry. There is no rash or diaphoresis. Psychiatric: He has a normal mood and affect. His speech is normal and behavior is normal.     Lab Review:      Lab Results   Component Value Date    HDL 89 06/18/2021    LDLCALC 74 06/18/2021    LABVLDL 16 06/18/2021         Lab Results   Component Value Date     06/18/2021     Lab Results   Component Value Date    HGB 13.5 06/18/2021    HCT 39.4 06/18/2021     Lab Results   Component Value Date     06/18/2021      Lab Results   Component Value Date    K 4.2 06/18/2021    K 3.7 06/05/2020     Lab Results   Component Value Date    BUN 8 06/18/2021    CREATININE 0.7 06/18/2021       EKG Interpretation: 7/27/20 SR      7/21/21: Sinus  Rhythm with -Poor R-wave progression -nonspecific -consider old anterior infarct. Image Review:     CT chest wo contrast: 6/4/20  . No acute abnormality in the chest.    2. 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 3. Severe coronary artery disease       Echo: 6/4/20  Summary   Left ventricular cavity size is normal.   Normal left ventricular wall thickness. Ejection fraction is visually estimated to be 55-60%. Mitral valve leaflets appear mildly thickened. Trivial mitral regurgitation. Trivial tricuspid regurgitation. Stress study: 6/4/20  Summary     Pharmacological Stress/MPI Results:          1. Technically a satisfactory study.     2. Normal pharmacological stress portion of the study.     3. No evidence of Ischemia by Myocardial Perfusion Imaging.     4. Gated Study shows normal LV size and Systolic function; EF is 67 %. L/RHC:8/7/20  IMPRESSION:  1. Moderate to heavy calcification of the left main trunk which is widely patent. 2.  Mild to moderate calcification of the proximal and the mid LAD with patent LAD and 30% diagonal stenosis. 3.  Patent circumflex artery and its branches. 4.  A 30% stenosis of the distal right coronary artery with evidence of mild calcification. 5.  Preserved left ventricular systolic function with normal left heart hemodynamics.     In view of the above findings, we will consider the patient's pain to be noncardiac in origin and look for other etiologies for the patient's chest pain but continue with risk factor modification. Assessment/Plan:     GÓMEZ, SOB, chronic  Patient presents after 11 months. He reports continue GÓMEZ, SOB that he feels is worsening. We reviewed his echo, stress 6/2020, R/LC 8/2020 at length. His physical exam reveals expiratory wheezes. Lung workup underway per PCP. He continues smoking daily. New script for Nicoderm. Labs stable 6/18/21. I would like to repeat an echocardiogram to assess structural heart disease. Coronary Calcification   -Hx of abnormal severe coronary artery calcification per his CT Chest followed by Lexiscan -Myoview Stress Test and Echocardiogram which were within acceptable limits.   -He had c/o SOBE and chest pain brought on with any type of exertion, worsens with moderate-heavy exertion at work. He also has nocturnal SOB with wheezing.    -His stress test showed no reversible ischemia, with his worsening symptoms of exertional shortness of breath and extensive coronary calcification seen on the prior cardiac CT, we proceeded  with a left and right heart cath revealing non obstructive CAD and preserved left ventricular systolic function with normal left hemodynamics. -his chest pain will be considered non cardiac in origin and referred to his PCP. -we will continue risk factor modifications.    Today, he denies any new cardiac sounding complaints. He continues with chronic GÓMEZ that he feels has worsened, expiratory wheezes upon physical exam.   Lung workup underway per PCP. Continue statin and anticoagulation therapy. Hypertension, essential  Controlled on medical therapy. BMP stable 6/18/21. Smoking addiction  Smoking 1-1.5 ppd or every 2 days. I have have again stressed the importance of working towards smoking cessation and spent 2 minutes discussing. ETOH use  Continues daily and encouraged working towards reducing consumption and/or cessation. We will plan to call with echo results and f/u in 6 months. Complexity of medical decision making    Thank you very much for allowing me to participate in the care of your patient. Please do not hesitate to contact me if you have any questions. Sincerely,  Hany Butler MD      [facility], [address] [place] [address]  Ph: [phone]  Fax: [phone]    This note was scribed in the presence of Dr. Carmen Zarate MD by Michelle Aguayo RN.

## 2021-07-21 ENCOUNTER — OFFICE VISIT (OUTPATIENT)
Dept: CARDIOLOGY CLINIC | Age: 61
End: 2021-07-21
Payer: COMMERCIAL

## 2021-07-21 VITALS
BODY MASS INDEX: 22.29 KG/M2 | SYSTOLIC BLOOD PRESSURE: 136 MMHG | WEIGHT: 142 LBS | HEIGHT: 67 IN | HEART RATE: 72 BPM | DIASTOLIC BLOOD PRESSURE: 78 MMHG | OXYGEN SATURATION: 96 %

## 2021-07-21 DIAGNOSIS — I10 ESSENTIAL HYPERTENSION: ICD-10-CM

## 2021-07-21 DIAGNOSIS — I25.10 CORONARY ARTERY DISEASE INVOLVING NATIVE CORONARY ARTERY OF NATIVE HEART WITHOUT ANGINA PECTORIS: Primary | ICD-10-CM

## 2021-07-21 DIAGNOSIS — F17.200 SMOKING ADDICTION: ICD-10-CM

## 2021-07-21 DIAGNOSIS — R06.09 DOE (DYSPNEA ON EXERTION): ICD-10-CM

## 2021-07-21 PROCEDURE — 99214 OFFICE O/P EST MOD 30 MIN: CPT | Performed by: INTERNAL MEDICINE

## 2021-07-21 PROCEDURE — 93000 ELECTROCARDIOGRAM COMPLETE: CPT | Performed by: INTERNAL MEDICINE

## 2021-07-21 NOTE — PATIENT INSTRUCTIONS
Patient Education        Transthoracic Echocardiogram: About This Test  What is it? An echocardiogram (also called an echo) uses sound waves to make an image of your heart. A device called a transducer sends sound waves that echo off your heart and back to the transducer. These echoes are turned into moving pictures of your heart that can be seen on a video screen. In a transthoracic echocardiogram (TTE), the transducer is moved across your chest or belly. A TTE is the most common type of echocardiogram.  Why is this test done? This test is done to check your heart health. It's used for many reasons. For example, it may be done to:  · Check a heart murmur. · Look for the cause of shortness of breath or unexplained chest pain or pressure. · Check how well your heart is pumping blood. · Check to see how well your heart valves are working. · Look for blood clots inside your heart. · Measure the size and shape of the heart's chambers. · Measure the blood pressure and speed of blood flow through the heart. How is the test done? · You may be asked to remove your clothes above your waist. You may be given a cloth or paper covering to use during the test.  · You will lie on your back or on your left side on a bed or table. · You may receive medicine through a vein (intravenously, or IV). The IV can be used to give you a contrast material. This helps your doctor get good views of your heart. · Small pads or patches (electrodes) will be placed on the skin of your chest to record your heart rate during the test.  · A small amount of gel will be rubbed on the side of your chest to help  the sound waves. · The transducer will be pressed firmly against your chest and moved slowly back and forth. It is usually moved to different areas on your chest or belly to get specific views of your heart.   · You will be asked to do several things, such as hold very still, breathe in and out very slowly, hold your breath, or lie on your left side. · When the test is over, the gel is wiped off and the electrodes are removed. What are the risks of the test?  There are no known risks from having this test.  · No electricity passes through your body during the test. There is no danger of getting an electrical shock. · You do not receive any radiation. What happens after the test?  · You will probably be able to go home right away. It depends on the reason for the test.  · You can go back to your usual activities right away. Follow-up care is a key part of your treatment and safety. Be sure to make and go to all appointments, and call your doctor if you are having problems. It's also a good idea to keep a list of the medicines you take. Ask your doctor when you can expect to have your test results. Current as of: August 31, 2020               Content Version: 12.9  © 2006-2021 [employer]. Care instructions adapted under license by [facility] ([facility]). If you have questions about a medical condition or this instruction, always ask your healthcare professional. [address] any warranty or liability for your use of this information. Patient Education        Heart-Healthy Diet: Care Instructions  Your Care Instructions     A heart-healthy diet has lots of vegetables, fruits, nuts, beans, and whole grains, and is low in salt. It limits foods that are high in saturated fat, such as meats, cheeses, and fried foods. It may be hard to change your diet, but even small changes can lower your risk of heart attack and heart disease. Follow-up care is a key part of your treatment and safety. Be sure to make and go to all appointments, and call your doctor if you are having problems. It's also a good idea to know your test results and keep a list of the medicines you take. How can you care for yourself at home?   Watch your portions  · Use food labels to learn what the recommended servings are for the foods you eat. · Eat only the number of calories you need to stay at a healthy weight. If you need to lose weight, eat fewer calories than your body burns (through exercise and other physical activity). Eat more fruits and vegetables  · Eat a variety of fruit and vegetables every day. Dark green, deep orange, red, or yellow fruits and vegetables are especially good for you. Examples include spinach, carrots, peaches, and berries. · Keep carrots, celery, and other veggies handy for snacks. Buy fruit that is in season and store it where you can see it so that you will be tempted to eat it. · Cook dishes that have a lot of veggies in them, such as stir-fries and soups. Limit saturated fat  · Read food labels, and try to avoid saturated fats. They increase your risk of heart disease. · Use olive or canola oil when you cook. · Bake, broil, grill, or steam foods instead of frying them. · Choose lean meats instead of high-fat meats such as hot dogs and sausages. Cut off all visible fat when you prepare meat. · Eat fish, skinless poultry, and meat alternatives such as soy products instead of high-fat meats. Soy products, such as tofu, may be especially good for your heart. · Choose low-fat or fat-free milk and dairy products. Eat foods high in fiber  · Eat a variety of grain products every day. Include whole-grain foods that have lots of fiber and nutrients. Examples of whole-grain foods include oats, whole wheat bread, and brown rice. · Buy whole-grain breads and cereals, instead of white bread or pastries. Limit salt and sodium  · Limit how much salt and sodium you eat to help lower your blood pressure. · Taste food before you salt it. Add only a little salt when you think you need it. With time, your taste buds will adjust to less salt. · Eat fewer snack items, fast foods, and other high-salt, processed foods. Check food labels for the amount of sodium in packaged foods.   · Choose low-sodium versions of canned goods (such as soups, vegetables, and beans). Limit sugar  · Limit drinks and foods with added sugar. These include candy, desserts, and soda pop. Limit alcohol  · Limit alcohol to no more than 2 drinks a day for men and 1 drink a day for women. Too much alcohol can cause health problems. When should you call for help? Watch closely for changes in your health, and be sure to contact your doctor if:    · You would like help planning heart-healthy meals. Where can you learn more? Go to https://WooMebuddyeb.[employer]. org and sign in to your [employer] account. Enter V137 in the [employer] box to learn more about \"Heart-Healthy Diet: Care Instructions. \"     If you do not have an account, please click on the \"Sign Up Now\" link. Current as of: December 17, 2020               Content Version: 12.9  © 2006-2021 [employer]. Care instructions adapted under license by [facility] If you have questions about a medical condition or this instruction, always ask your healthcare professional. [place] [address] any warranty or liability for your use of this information. Patient Education        Learning About Coronary Artery Disease (CAD)  What is coronary artery disease? Coronary artery disease is a condition that occurs when plaque builds up in the arteries that bring oxygen-rich blood to your heart. Plaque is a fatty substance made of cholesterol, calcium, and other substances in the blood. This process is called hardening of the arteries, or atherosclerosis. What happens when you have coronary artery disease? · Plaque may narrow the coronary arteries. Narrowed arteries cause poor blood flow. This can lead to angina symptoms such as chest pain or discomfort. If blood flow is completely blocked, you could have a heart attack. · You can slow and reduce the risk of future problems by making changes in your lifestyle.  These include quitting smoking and eating heart-healthy foods. · Treatment, along with changes in your lifestyle, can help you live a longer and healthier life. How can you prevent coronary artery disease? · Do not smoke. It may be the best thing you can do to prevent coronary artery disease. If you need help quitting, talk to your doctor about stop-smoking programs and medicines. These can increase your chances of quitting for good. · Be active. Try to do moderate activity at least 2½ hours a week. Or try to do vigorous activity at least 1¼ hours a week. You may want to walk or try other activities, such as running, swimming, cycling, or playing tennis or team sports. · Eat heart-healthy foods. Eat more fruits and vegetables and less food that contains saturated and trans fats. Limit alcohol, sodium, and sweets. · Stay at a healthy weight. Lose weight if you need to. · Manage other health problems such as diabetes, high blood pressure, and high cholesterol. How is coronary artery disease treated? · Your doctor will suggest that you make lifestyle changes. For example, your doctor may ask you to eat healthy foods, quit smoking, lose extra weight, and be more active. · You will take medicines that help prevent a heart attack. · Your doctor may suggest a procedure to open narrowed or blocked arteries. This is called angioplasty. Or your doctor may suggest using healthy blood vessels to create detours around narrowed or blocked arteries. This is called bypass surgery. Follow-up care is a key part of your treatment and safety. Be sure to make and go to all appointments, and call your doctor if you are having problems. It's also a good idea to know your test results and keep a list of the medicines you take. Where can you learn more? Go to https://marj.[employer]. org and sign in to your [employer] account. Enter [phone] in the Doctor kinetic box to learn more about \"Learning About Coronary Artery Disease (CAD). \"     If you do not have an account, please click on the \"Sign Up Now\" link. Current as of: August 31, 2020               Content Version: 12.9  © 2006-2021 [employer]. Care instructions adapted under license by [facility] ([facility]). If you have questions about a medical condition or this instruction, always ask your healthcare professional. [address] any warranty or liability for your use of this information.

## 2021-07-23 ENCOUNTER — OFFICE VISIT (OUTPATIENT)
Dept: FAMILY MEDICINE CLINIC | Age: 61
End: 2021-07-23
Payer: COMMERCIAL

## 2021-07-23 VITALS
HEIGHT: 67 IN | OXYGEN SATURATION: 98 % | HEART RATE: 69 BPM | SYSTOLIC BLOOD PRESSURE: 138 MMHG | RESPIRATION RATE: 18 BRPM | DIASTOLIC BLOOD PRESSURE: 82 MMHG | BODY MASS INDEX: 22.05 KG/M2 | WEIGHT: 140.5 LBS

## 2021-07-23 DIAGNOSIS — R74.8 ELEVATED LIVER ENZYMES: ICD-10-CM

## 2021-07-23 DIAGNOSIS — E04.9 THYROID GOITER: ICD-10-CM

## 2021-07-23 DIAGNOSIS — I25.10 CORONARY ARTERY DISEASE INVOLVING NATIVE CORONARY ARTERY OF NATIVE HEART WITHOUT ANGINA PECTORIS: ICD-10-CM

## 2021-07-23 DIAGNOSIS — F10.10 ALCOHOL ABUSE: ICD-10-CM

## 2021-07-23 DIAGNOSIS — R06.02 SHORTNESS OF BREATH: ICD-10-CM

## 2021-07-23 DIAGNOSIS — Z72.0 TOBACCO ABUSE: ICD-10-CM

## 2021-07-23 DIAGNOSIS — Z11.4 SCREENING FOR HIV (HUMAN IMMUNODEFICIENCY VIRUS): ICD-10-CM

## 2021-07-23 DIAGNOSIS — E53.8 FOLATE DEFICIENCY: ICD-10-CM

## 2021-07-23 LAB
ALBUMIN SERPL-MCNC: 4.8 G/DL (ref 3.4–5)
ALP BLD-CCNC: 115 U/L (ref 40–129)
ALT SERPL-CCNC: 127 U/L (ref 10–40)
AST SERPL-CCNC: 221 U/L (ref 15–37)
BILIRUB SERPL-MCNC: 1.1 MG/DL (ref 0–1)
BILIRUBIN DIRECT: 0.3 MG/DL (ref 0–0.3)
BILIRUBIN, INDIRECT: 0.8 MG/DL (ref 0–1)
TOTAL PROTEIN: 7.4 G/DL (ref 6.4–8.2)

## 2021-07-23 PROCEDURE — 99214 OFFICE O/P EST MOD 30 MIN: CPT | Performed by: NURSE PRACTITIONER

## 2021-07-23 ASSESSMENT — ENCOUNTER SYMPTOMS
NAUSEA: 0
CONSTIPATION: 0
SHORTNESS OF BREATH: 1
VOMITING: 0
CHEST TIGHTNESS: 0
DIARRHEA: 0
COUGH: 0

## 2021-07-23 NOTE — PROGRESS NOTES
7/23/2021    This is a 64 y.o. male   Chief Complaint   Patient presents with    Follow-up     chronic conditions. Patient still complaining of SOB with exertion. He has cut down on his smoking. He is unable to get the nicotine patches due to cost. He is scheduled for an Echocardiogram  in August   .    HPI  CAD- seen cardiologist Dr. Ju Tamez (7/21/21) and ordered echocardiogram   History of abnormal severe coronary artery calcification per CT chest followed by lexiscan and echocardiogram which were within acceptable limits. Had left and right heart cath (8/2020) reveling non obstructive CAD and preserved left ventricular systolic function. Has been taking his ASA therapy. Never picked up his atorvastatin from his pharmacy. Hypertension:  Home blood pressure monitoring: No.  He is not adherent to a low sodium diet. Patient denies chest pain, headache, lightheadedness, peripheral edema and palpitations. Has been on amlodipine in the past, but currently off due to normal BP. Hyperlipidemia:  Reports that he has not been taking his atorvastatin as he did not  from the pharmacy. No results found for: LABA1C  Lab Results   Component Value Date    CREATININE 0.7 (L) 06/18/2021     Lab Results   Component Value Date    ALT 95 (H) 06/18/2021     (H) 06/18/2021     Lab Results   Component Value Date    HDL 89 (H) 06/18/2021    LDLCALC 74 06/18/2021        Elevated liver enzymes- Ordered u/s after last visit which he has yet to schedule. Reports that he was drinking 6 beers per day and 1 pint of vodka per day. He reports that he is now down to 2 beers per day and 1/2 pint of vodka per week. Has not been taking tylenol. Folate deficiency- has not yet picked up his folic acid prescription from his pharmacy. Ordered PFT last visit due to his shortness of breath which he has yet to schedule. Reports that he continues with shortness of breath with any walking. Denies wheezing.    Reports that he was smoking 1 ppd. Now he is down to 1/2 ppd. He did not obtain the nicoderm patches due to cost.   Chest xray normal 7/2020    Ordered thyroid u/s last visit for thyroid goiter which he has yet to schedule. TSH normal 6/18/21          Patient Active Problem List   Diagnosis    Thyroid goiter    Chest pain    Shortness of breath    Essential hypertension    Elevated liver enzymes    Alcohol abuse    Folate deficiency    Coronary artery disease involving native coronary artery of native heart without angina pectoris          Current Outpatient Medications   Medication Sig Dispense Refill    aspirin 81 MG chewable tablet Take 1 tablet by mouth daily 30 tablet 3    folic acid (FOLVITE) 1 MG tablet Take 1 tablet by mouth daily (Patient not taking: Reported on 7/23/2021) 90 tablet 1    atorvastatin (LIPITOR) 20 MG tablet Take 1 tablet by mouth nightly (Patient not taking: Reported on 7/23/2021) 30 tablet 3    nicotine (NICODERM CQ) 14 MG/24HR Place 1 patch onto the skin every 24 hours (Patient not taking: Reported on 7/23/2021) 30 patch 1     No current facility-administered medications for this visit. No Known Allergies    Review of Systems   Constitutional: Negative for activity change. Respiratory: Positive for shortness of breath. Negative for cough and chest tightness. Cardiovascular: Negative for chest pain and palpitations. Gastrointestinal: Negative for constipation, diarrhea, nausea and vomiting. Neurological: Negative for dizziness, syncope, weakness and headaches. Psychiatric/Behavioral: Negative for confusion. Vitals:    07/23/21 0858   BP: 138/82   Site: Right Upper Arm   Position: Sitting   Cuff Size: Medium Adult   Pulse: 69   Resp: 18   SpO2: 98%   Weight: 140 lb 8 oz (63.7 kg)   Height: 5' 7\" (1.702 m)       Body mass index is 22.01 kg/m².      Wt Readings from Last 3 Encounters:   07/23/21 140 lb 8 oz (63.7 kg)   07/21/21 142 lb (64.4 kg)   06/18/21 149 lb (67.6 kg) BP Readings from Last 3 Encounters:   07/23/21 138/82   07/21/21 136/78   06/18/21 110/60       Physical Exam  Vitals and nursing note reviewed. Constitutional:       General: He is not in acute distress. Appearance: He is well-developed. HENT:      Head: Normocephalic and atraumatic. Eyes:      Extraocular Movements: Extraocular movements intact. Conjunctiva/sclera: Conjunctivae normal.   Neck:      Thyroid: Thyromegaly present. Cardiovascular:      Rate and Rhythm: Normal rate and regular rhythm. Heart sounds: Normal heart sounds. No murmur heard. No friction rub. No gallop. Pulmonary:      Effort: Pulmonary effort is normal. No respiratory distress. Breath sounds: Normal breath sounds. Musculoskeletal:      Cervical back: Neck supple. Right lower leg: No edema. Left lower leg: No edema. Skin:     General: Skin is warm and dry. Neurological:      Mental Status: He is alert and oriented to person, place, and time. Psychiatric:         Behavior: Behavior normal.         Thought Content: Thought content normal.         Judgment: Judgment normal.         Assessmentand Plan  Peng Higginbotham was seen today for follow-up. Diagnoses and all orders for this visit:    Coronary artery disease involving native coronary artery of native heart without angina pectoris  Left and right heart cath from 8/2020 showed non obstructive CAD. Advised to take ASA 81 mg daily and atorvastatin 20 mg daily  Advised smoking cessation. Continue f/u with cardiologist Dr. Bland [place] of breath  Still needs to complete PFT that was previously ordered. Chest xray was previously normal.     Has echocardiogram schedule 8/5/21    Elevated liver enzymes  -     Hepatic Function Panel; Future  Advised to have abd u/s completed and GI evaluation that was previously ordered.    Should continue to work on decreasing alcohol intake  Advised to avoid tylenol    Alcohol abuse  Advised to continue to decrease alcohol intake    Thyroid goiter  Advised to complete thyroid u/s that was previously been ordered    Folate deficiency  Advised to start folic acid 1 mg daily as previously been ordered     Tobacco abuse  Smoking cessation discussed and needed. Reports that nicoderm was too expensive. He is working on decreasing     Screening for HIV (human immunodeficiency virus)  -     HIV-1 AND HIV-2 ANTIBODIES; Future    Return in about 4 weeks (around 8/20/2021), or if symptoms worsen or fail to improve, for follow up from testing .

## 2021-07-24 LAB
HIV AG/AB: NORMAL
HIV ANTIGEN: NORMAL
HIV-1 ANTIBODY: NORMAL
HIV-2 AB: NORMAL

## 2021-08-03 ENCOUNTER — HOSPITAL ENCOUNTER (OUTPATIENT)
Dept: ULTRASOUND IMAGING | Age: 61
Discharge: HOME | End: 2021-08-03
Payer: COMMERCIAL

## 2021-08-03 ENCOUNTER — HOSPITAL ENCOUNTER (OUTPATIENT)
Dept: PULMONOLOGY | Age: 61
Discharge: HOME | End: 2021-08-03
Payer: COMMERCIAL

## 2021-08-03 DIAGNOSIS — E04.9 THYROID GOITER: ICD-10-CM

## 2021-08-03 DIAGNOSIS — F10.10 ALCOHOL ABUSE: ICD-10-CM

## 2021-08-03 DIAGNOSIS — R74.8 ELEVATED LIVER ENZYMES: ICD-10-CM

## 2021-08-03 LAB
DLCO %PRED: 80 %
DLCO PRED: NORMAL
DLCO/VA %PRED: 92 %
DLCO/VA PRED: NORMAL
DLCO/VA: NORMAL
DLCO: NORMAL
EXPIRATORY TIME-POST: NORMAL
EXPIRATORY TIME: NORMAL
FEF 25-75% %CHNG: NORMAL
FEF 25-75% %PRED-POST: NORMAL
FEF 25-75% %PRED-PRE: NORMAL
FEF 25-75% PRED: NORMAL
FEF 25-75%-POST: NORMAL
FEF 25-75%-PRE: NORMAL
FEV1 %PRED-POST: 116 %
FEV1 %PRED-PRE: 104 %
FEV1 PRED: NORMAL
FEV1-POST: NORMAL
FEV1-PRE: NORMAL
FEV1/FVC %PRED-POST: NORMAL
FEV1/FVC %PRED-PRE: NORMAL
FEV1/FVC PRED: NORMAL
FEV1/FVC-POST: 85 %
FEV1/FVC-PRE: 86 %
FVC %PRED-POST: NORMAL
FVC %PRED-PRE: NORMAL
FVC PRED: NORMAL
FVC-POST: NORMAL
FVC-PRE: NORMAL
GAW %PRED: NORMAL
GAW PRED: NORMAL
GAW: NORMAL
IC %PRED: NORMAL
IC PRED: NORMAL
IC: NORMAL
MEP: NORMAL
MIP: NORMAL
MVV %PRED-PRE: NORMAL
MVV PRED: NORMAL
MVV-PRE: NORMAL
PEF %PRED-POST: NORMAL
PEF %PRED-PRE: NORMAL
PEF PRED: NORMAL
PEF%CHNG: NORMAL
PEF-POST: NORMAL
PEF-PRE: NORMAL
RAW %PRED: NORMAL
RAW PRED: NORMAL
RAW: NORMAL
RV %PRED: NORMAL
RV PRED: NORMAL
RV: NORMAL
SVC %PRED: NORMAL
SVC PRED: NORMAL
SVC: NORMAL
TLC %PRED: 87 %
TLC PRED: NORMAL
TLC: NORMAL
VA %PRED: 86 %
VA PRED: NORMAL
VA: NORMAL
VTG %PRED: NORMAL
VTG PRED: NORMAL
VTG: NORMAL

## 2021-08-03 PROCEDURE — 76536 US EXAM OF HEAD AND NECK: CPT

## 2021-08-03 PROCEDURE — 94060 EVALUATION OF WHEEZING: CPT | Performed by: INTERNAL MEDICINE

## 2021-08-03 PROCEDURE — 94060 EVALUATION OF WHEEZING: CPT

## 2021-08-03 PROCEDURE — 94664 DEMO&/EVAL PT USE INHALER: CPT

## 2021-08-03 PROCEDURE — 94726 PLETHYSMOGRAPHY LUNG VOLUMES: CPT

## 2021-08-03 PROCEDURE — 76700 US EXAM ABDOM COMPLETE: CPT

## 2021-08-03 PROCEDURE — 94640 AIRWAY INHALATION TREATMENT: CPT

## 2021-08-03 PROCEDURE — 94729 DIFFUSING CAPACITY: CPT | Performed by: INTERNAL MEDICINE

## 2021-08-03 PROCEDURE — 6370000000 HC RX 637 (ALT 250 FOR IP): Performed by: INTERNAL MEDICINE

## 2021-08-03 PROCEDURE — 94729 DIFFUSING CAPACITY: CPT

## 2021-08-03 PROCEDURE — 94726 PLETHYSMOGRAPHY LUNG VOLUMES: CPT | Performed by: INTERNAL MEDICINE

## 2021-08-03 RX ORDER — ALBUTEROL SULFATE 90 UG/1
4 AEROSOL, METERED RESPIRATORY (INHALATION) ONCE
Status: COMPLETED | OUTPATIENT
Start: 2021-08-03 | End: 2021-08-03

## 2021-08-03 RX ADMIN — ALBUTEROL SULFATE 4 PUFF: 90 AEROSOL, METERED RESPIRATORY (INHALATION) at 10:40

## 2021-08-03 ASSESSMENT — PULMONARY FUNCTION TESTS
FEV1/FVC_POST: 85
FEV1/FVC_PRE: 86
FEV1_PERCENT_PREDICTED_POST: 116
FEV1_PERCENT_PREDICTED_PRE: 104

## 2021-08-03 NOTE — PROCEDURES
Reason for PFT:  SOB    Spirometry  1. Spirometry is normal.    Lung Volumes  2. Lung volumes are normal.    Bronchodilator  1. There is a response to bronchodilator. Flow-Volume Loop  3. The flow-volume loop is normal.    Diffusing Capacity  4. Diffusing capacity is normal.      Overall Interpretation  No obstruction is present    No restriction is present    There is a bronchodilator response present    Diffusion capacity is normal    FEV1 is 2.75 L at 104% predicted. FVC is 3.21 L at 95% predicted    FEV1/FVC ratio is 86    Normal study with bronchodilator response. May suggest underlying asthma. OBSTRUCTION % Predicted FEV1   MILD >70%   MODERATE 60-69%   MODERATELY-SEVERE 50-59%   SEVERE 35-49%   VERY SEVERE <35%         RESTRICTION % Predicted TLC   MILD Less than LLN but > than 70%   MODERATE 60-69%   MODERATELY-SEVERE <60%                 DIFFUSION CAPACITY DL,CO % Pred   MILD >60% AND < LLN   MODERATE 40-60%   SEVERE <40%       PFT data will be scanned into the media tab in epic.     [facility]

## 2021-08-20 ENCOUNTER — OFFICE VISIT (OUTPATIENT)
Dept: FAMILY MEDICINE CLINIC | Age: 61
End: 2021-08-20
Payer: COMMERCIAL

## 2021-08-20 VITALS
WEIGHT: 141 LBS | SYSTOLIC BLOOD PRESSURE: 138 MMHG | HEART RATE: 94 BPM | DIASTOLIC BLOOD PRESSURE: 80 MMHG | HEIGHT: 67 IN | BODY MASS INDEX: 22.13 KG/M2 | RESPIRATION RATE: 18 BRPM | OXYGEN SATURATION: 98 %

## 2021-08-20 DIAGNOSIS — F10.10 ALCOHOL ABUSE: ICD-10-CM

## 2021-08-20 DIAGNOSIS — Z87.891 PERSONAL HISTORY OF TOBACCO USE: ICD-10-CM

## 2021-08-20 DIAGNOSIS — E04.1 THYROID NODULE: ICD-10-CM

## 2021-08-20 DIAGNOSIS — I25.10 CORONARY ARTERY DISEASE INVOLVING NATIVE CORONARY ARTERY OF NATIVE HEART WITHOUT ANGINA PECTORIS: ICD-10-CM

## 2021-08-20 DIAGNOSIS — E53.8 FOLATE DEFICIENCY: ICD-10-CM

## 2021-08-20 DIAGNOSIS — R74.8 ELEVATED LIVER ENZYMES: ICD-10-CM

## 2021-08-20 DIAGNOSIS — R06.02 SHORTNESS OF BREATH: ICD-10-CM

## 2021-08-20 PROCEDURE — G8427 DOCREV CUR MEDS BY ELIG CLIN: HCPCS | Performed by: NURSE PRACTITIONER

## 2021-08-20 PROCEDURE — G8420 CALC BMI NORM PARAMETERS: HCPCS | Performed by: NURSE PRACTITIONER

## 2021-08-20 PROCEDURE — G0296 VISIT TO DETERM LDCT ELIG: HCPCS | Performed by: NURSE PRACTITIONER

## 2021-08-20 PROCEDURE — 3017F COLORECTAL CA SCREEN DOC REV: CPT | Performed by: NURSE PRACTITIONER

## 2021-08-20 PROCEDURE — 4004F PT TOBACCO SCREEN RCVD TLK: CPT | Performed by: NURSE PRACTITIONER

## 2021-08-20 PROCEDURE — 99214 OFFICE O/P EST MOD 30 MIN: CPT | Performed by: NURSE PRACTITIONER

## 2021-08-20 RX ORDER — FLUTICASONE PROPIONATE 110 UG/1
2 AEROSOL, METERED RESPIRATORY (INHALATION) 2 TIMES DAILY
Qty: 1 INHALER | Refills: 1 | Status: SHIPPED
Start: 2021-08-20 | End: 2021-09-27 | Stop reason: ALTCHOICE

## 2021-08-20 ASSESSMENT — ENCOUNTER SYMPTOMS
CONSTIPATION: 0
VOMITING: 0
NAUSEA: 0
COUGH: 0
CHEST TIGHTNESS: 0
SHORTNESS OF BREATH: 1
DIARRHEA: 0

## 2021-08-20 NOTE — PROGRESS NOTES
8/20/2021    This is a 64 y.o. male   Chief Complaint   Patient presents with    Other     Patient here to follow up after PFT test. He still has SOB. He has never picked up any of his medications. Patient has appointment with GI on 8/31/21   . HPI  CAD- seen cardiologist Dr. Chaka Taylor (7/21/21) and ordered echocardiogram. Missed his scheduled appt and therefore did not complete. History of abnormal severe coronary artery calcification per CT chest followed by lexiscan and echocardiogram which were within acceptable limits. Had left and right heart cath (8/2020) reveling non obstructive CAD and preserved left ventricular systolic function. Has been taking his ASA therapy. Never picked up his atorvastatin from his pharmacy. Hypertension:  Home blood pressure monitoring: No.  He is not adherent to a low sodium diet. Patient denies chest pain, headache, lightheadedness, peripheral edema and palpitations. Has been on amlodipine in the past, but currently off due to normal BP. Hyperlipidemia:  Reports that he has not been taking his atorvastatin as he did not  from the pharmacy. No results found for: LABA1C  Lab Results   Component Value Date    CREATININE 0.7 (L) 06/18/2021     Lab Results   Component Value Date     (H) 07/23/2021     (H) 07/23/2021     Lab Results   Component Value Date    HDL 89 (H) 06/18/2021    LDLCALC 74 06/18/2021        Elevated liver enzymes- Ordered abd u/s that showed fatty liver. Reports that he was drinking 6 beers per day and 1 pint of vodka per day. He reports that he is now down to 1/2 pint of vodka per week and 1 beer every 3 days. Has not been taking tylenol. Has appt with TIGIST Stanley on 8/31/21. Folate deficiency- has not yet picked up his folic acid prescription from his pharmacy. Ordered PFT last visit due to his shortness of breath which showed normal study with bronchodilator response--may suggest underlying asthma.  Reports kg)   07/23/21 140 lb 8 oz (63.7 kg)   07/21/21 142 lb (64.4 kg)       BP Readings from Last 3 Encounters:   08/20/21 138/80   07/23/21 138/82   07/21/21 136/78       Physical Exam  Vitals and nursing note reviewed. Constitutional:       General: He is not in acute distress. Appearance: He is well-developed. HENT:      Head: Normocephalic and atraumatic. Eyes:      Extraocular Movements: Extraocular movements intact. Conjunctiva/sclera: Conjunctivae normal.   Neck:      Thyroid: Thyromegaly present. Cardiovascular:      Rate and Rhythm: Normal rate and regular rhythm. Heart sounds: Normal heart sounds. No murmur heard. No friction rub. No gallop. Pulmonary:      Effort: Pulmonary effort is normal. No respiratory distress. Breath sounds: Normal breath sounds. Musculoskeletal:      Cervical back: Neck supple. Right lower leg: No edema. Left lower leg: No edema. Skin:     General: Skin is warm and dry. Neurological:      Mental Status: He is alert and oriented to person, place, and time. Psychiatric:         Behavior: Behavior normal.         Thought Content: Thought content normal.         Judgment: Judgment normal.         Assessmentand Plan  Sima Lopez was seen today for follow-up. Diagnoses and all orders for this visit:    Coronary artery disease involving native coronary artery of native heart without angina pectoris  Left and right heart cath from 8/2020 showed non obstructive CAD. Advised to take ASA 81 mg daily and atorvastatin 20 mg daily  Advised smoking cessation. Continue f/u with cardiologist Dr. Marcelo Syed of breath  PFT was suggestive of possible asthma. Will do trail of flovent 110 mcg 2 puffs BID. Advised to rinse mouth out after use. Needs to reschedule echocardiogram     Elevated liver enzymes  -     Hepatic Function Panel; Future  Liver u/s showed fatty liver.     Should continue to work on decreasing alcohol intake  Advised to avoid tylenol  Should keep GI evaluation with Dr. Shantell Garcia on 8/31/21    Alcohol abuse  Advised to continue to decrease alcohol intake    Thyroid nodule   Advised to have evaluation with endocrinologist Dr. Andrea Parsons and to have biopsy     Folate deficiency  Advised to start folic acid 1 mg daily as previously been ordered     Tobacco abuse  Smoking cessation discussed and needed. Reports that nicoderm was too expensive. He is working on decreasing     -     AR VISIT TO DISCUSS LUNG CA SCREEN W LDCT  -     CT Lung Screen (Annual); Future    Advised to obtain pneumovax at his pharmacy    Return in about 4 weeks (around 9/17/2021), or if symptoms worsen or fail to improve, for chronic conditions. Low Dose CT (LDCT) Lung Screening criteria met   Age 50-69   Pack year smoking >30   Still smoking or less than 15 year since quit   No sign or symptoms of lung cancer   > 11 months since last LDCT     Risks and benefits of lung cancer screening with LDCT scans discussed:    Significance of positive screen - False-positive LDCT results often occur. 95% of all positive results do not lead to a diagnosis of cancer. Usually further imaging can resolve most false-positive results; however, some patients may require invasive procedures. Over diagnosis risk - 10% to 12% of screen-detected lung cancer cases are over diagnosed--that is, the cancer would not have been detected in the patient's lifetime without the screening. Need for follow up screens annually to continue lung cancer screening effectiveness     Risks associated with radiation from annual LDCT- Radiation exposure is about the same as for a mammogram, which is about 1/3 of the annual background radiation exposure from everyday life. Starting screening at age 54 is not likely to increase cancer risk from radiation exposure.     Patients with comorbidities resulting in life expectancy of < 10 years, or that would preclude treatment of an abnormality identified on CT, should not be screened due to lack of benefit.     To obtain maximal benefit from this screening, smoking cessation and long-term abstinence from smoking is critical

## 2021-09-02 ENCOUNTER — HOSPITAL ENCOUNTER (OUTPATIENT)
Dept: NON INVASIVE DIAGNOSTICS | Age: 61
Discharge: HOME | End: 2021-09-02
Payer: COMMERCIAL

## 2021-09-02 DIAGNOSIS — R06.09 DOE (DYSPNEA ON EXERTION): ICD-10-CM

## 2021-09-02 LAB
LV EF: 60 %
LVEF MODALITY: NORMAL

## 2021-09-02 PROCEDURE — 93306 TTE W/DOPPLER COMPLETE: CPT

## 2021-09-07 ENCOUNTER — HOSPITAL ENCOUNTER (OUTPATIENT)
Dept: CT IMAGING | Age: 61
Discharge: HOME | End: 2021-09-07
Payer: COMMERCIAL

## 2021-09-07 DIAGNOSIS — Z87.891 PERSONAL HISTORY OF TOBACCO USE: ICD-10-CM

## 2021-09-07 PROBLEM — J43.8 OTHER EMPHYSEMA (HCC): Status: ACTIVE | Noted: 2021-09-07

## 2021-09-07 PROCEDURE — 71271 CT THORAX LUNG CANCER SCR C-: CPT

## 2021-09-26 ENCOUNTER — APPOINTMENT (OUTPATIENT)
Dept: CT IMAGING | Age: 61
End: 2021-09-26
Payer: COMMERCIAL

## 2021-09-26 ENCOUNTER — HOSPITAL ENCOUNTER (EMERGENCY)
Age: 61
Discharge: HOME | End: 2021-09-26
Attending: STUDENT IN AN ORGANIZED HEALTH CARE EDUCATION/TRAINING PROGRAM
Payer: COMMERCIAL

## 2021-09-26 VITALS
DIASTOLIC BLOOD PRESSURE: 94 MMHG | OXYGEN SATURATION: 100 % | SYSTOLIC BLOOD PRESSURE: 188 MMHG | HEIGHT: 66 IN | TEMPERATURE: 98.1 F | WEIGHT: 142.19 LBS | HEART RATE: 74 BPM | RESPIRATION RATE: 17 BRPM | BODY MASS INDEX: 22.85 KG/M2

## 2021-09-26 DIAGNOSIS — G89.29 CHRONIC LEFT-SIDED LOW BACK PAIN WITHOUT SCIATICA: Primary | ICD-10-CM

## 2021-09-26 DIAGNOSIS — M79.10 MUSCULAR PAIN: ICD-10-CM

## 2021-09-26 DIAGNOSIS — M54.50 CHRONIC LEFT-SIDED LOW BACK PAIN WITHOUT SCIATICA: Primary | ICD-10-CM

## 2021-09-26 LAB
A/G RATIO: 1.8 (ref 1.1–2.2)
ALBUMIN SERPL-MCNC: 4.5 G/DL (ref 3.4–5)
ALP BLD-CCNC: 134 U/L (ref 40–129)
ALT SERPL-CCNC: 64 U/L (ref 10–40)
ANION GAP SERPL CALCULATED.3IONS-SCNC: 17 MMOL/L (ref 3–16)
AST SERPL-CCNC: 127 U/L (ref 15–37)
BASOPHILS ABSOLUTE: 0 K/UL (ref 0–0.2)
BASOPHILS RELATIVE PERCENT: 1.2 %
BILIRUB SERPL-MCNC: 1.4 MG/DL (ref 0–1)
BUN BLDV-MCNC: 5 MG/DL (ref 7–20)
CALCIUM SERPL-MCNC: 9 MG/DL (ref 8.3–10.6)
CHLORIDE BLD-SCNC: 99 MMOL/L (ref 99–110)
CO2: 23 MMOL/L (ref 21–32)
CREAT SERPL-MCNC: <0.5 MG/DL (ref 0.8–1.3)
EOSINOPHILS ABSOLUTE: 0 K/UL (ref 0–0.6)
EOSINOPHILS RELATIVE PERCENT: 0.1 %
GFR AFRICAN AMERICAN: >60
GFR NON-AFRICAN AMERICAN: >60
GLOBULIN: 2.5 G/DL
GLUCOSE BLD-MCNC: 134 MG/DL (ref 70–99)
HCT VFR BLD CALC: 40.2 % (ref 40.5–52.5)
HEMOGLOBIN: 13.9 G/DL (ref 13.5–17.5)
LYMPHOCYTES ABSOLUTE: 0.5 K/UL (ref 1–5.1)
LYMPHOCYTES RELATIVE PERCENT: 16.9 %
MAGNESIUM: 1.5 MG/DL (ref 1.8–2.4)
MCH RBC QN AUTO: 35.2 PG (ref 26–34)
MCHC RBC AUTO-ENTMCNC: 34.6 G/DL (ref 31–36)
MCV RBC AUTO: 101.7 FL (ref 80–100)
MONOCYTES ABSOLUTE: 0.2 K/UL (ref 0–1.3)
MONOCYTES RELATIVE PERCENT: 8.1 %
NEUTROPHILS ABSOLUTE: 2.1 K/UL (ref 1.7–7.7)
NEUTROPHILS RELATIVE PERCENT: 73.7 %
PDW BLD-RTO: 14.7 % (ref 12.4–15.4)
PLATELET # BLD: 150 K/UL (ref 135–450)
PLATELET SLIDE REVIEW: ADEQUATE
PMV BLD AUTO: 8.2 FL (ref 5–10.5)
POTASSIUM REFLEX MAGNESIUM: 3.5 MMOL/L (ref 3.5–5.1)
PRO-BNP: 33 PG/ML (ref 0–124)
RBC # BLD: 3.96 M/UL (ref 4.2–5.9)
SLIDE REVIEW: ABNORMAL
SODIUM BLD-SCNC: 139 MMOL/L (ref 136–145)
TOTAL PROTEIN: 7 G/DL (ref 6.4–8.2)
TROPONIN: <0.01 NG/ML
WBC # BLD: 2.9 K/UL (ref 4–11)

## 2021-09-26 PROCEDURE — 84484 ASSAY OF TROPONIN QUANT: CPT

## 2021-09-26 PROCEDURE — 80053 COMPREHEN METABOLIC PANEL: CPT

## 2021-09-26 PROCEDURE — 6370000000 HC RX 637 (ALT 250 FOR IP): Performed by: STUDENT IN AN ORGANIZED HEALTH CARE EDUCATION/TRAINING PROGRAM

## 2021-09-26 PROCEDURE — 85025 COMPLETE CBC W/AUTO DIFF WBC: CPT

## 2021-09-26 PROCEDURE — 83735 ASSAY OF MAGNESIUM: CPT

## 2021-09-26 PROCEDURE — 83880 ASSAY OF NATRIURETIC PEPTIDE: CPT

## 2021-09-26 PROCEDURE — 93005 ELECTROCARDIOGRAM TRACING: CPT | Performed by: STUDENT IN AN ORGANIZED HEALTH CARE EDUCATION/TRAINING PROGRAM

## 2021-09-26 PROCEDURE — 6360000004 HC RX CONTRAST MEDICATION: Performed by: STUDENT IN AN ORGANIZED HEALTH CARE EDUCATION/TRAINING PROGRAM

## 2021-09-26 PROCEDURE — 71260 CT THORAX DX C+: CPT

## 2021-09-26 PROCEDURE — 99284 EMERGENCY DEPT VISIT MOD MDM: CPT

## 2021-09-26 RX ORDER — OXYCODONE HYDROCHLORIDE AND ACETAMINOPHEN 5; 325 MG/1; MG/1
1 TABLET ORAL ONCE
Status: COMPLETED | OUTPATIENT
Start: 2021-09-26 | End: 2021-09-26

## 2021-09-26 RX ORDER — CYCLOBENZAPRINE HCL 10 MG
10 TABLET ORAL 3 TIMES DAILY PRN
Qty: 21 TABLET | Refills: 0 | Status: SHIPPED | OUTPATIENT
Start: 2021-09-26 | End: 2021-10-06

## 2021-09-26 RX ADMIN — IOPAMIDOL 75 ML: 755 INJECTION, SOLUTION INTRAVENOUS at 11:48

## 2021-09-26 RX ADMIN — OXYCODONE HYDROCHLORIDE AND ACETAMINOPHEN 1 TABLET: 5; 325 TABLET ORAL at 12:11

## 2021-09-26 ASSESSMENT — PAIN SCALES - GENERAL
PAINLEVEL_OUTOF10: 10
PAINLEVEL_OUTOF10: 10

## 2021-09-26 ASSESSMENT — PAIN DESCRIPTION - LOCATION: LOCATION: BACK

## 2021-09-26 ASSESSMENT — PAIN DESCRIPTION - PAIN TYPE: TYPE: ACUTE PAIN

## 2021-09-26 ASSESSMENT — PAIN DESCRIPTION - DESCRIPTORS: DESCRIPTORS: SHARP

## 2021-09-26 ASSESSMENT — PAIN DESCRIPTION - ORIENTATION: ORIENTATION: LEFT;UPPER

## 2021-09-26 NOTE — ED PROVIDER NOTES
Primary Care Physician: JAMMIE Kinney - CNP   Attending Physician: Chrissy Tipton MD     History   Chief Complaint   Patient presents with    Back Pain     upper left back pain x1 week, denies any injury. pain causes pt to have shortness of breath and pt has had vomiting today        HPI   [facility]. is a 64 y.o. male history of coronary artery disease, hypertension, hyperlipidemia who presents this afternoon complaining of back pain around the left scapula. Patient stated pain has been going on now for 1 week. Patient was seen here a month ago with similar presentation had a negative CT. He also had a recent echo. Apart from the muscle pain he denies any fevers chills nausea vomiting. Pain is worse with moving his hand on the left. Past Medical History:   Diagnosis Date    CAD (coronary artery disease)     Hyperlipidemia     Hypertension         History reviewed. No pertinent surgical history.      Family History   Problem Relation Age of Onset    Diabetes Mother     Liver Disease Father     Liver Disease Brother         Social History     Socioeconomic History    Marital status: Single     Spouse name: None    Number of children: 1    Years of education: None    Highest education level: None   Occupational History    None   Tobacco Use    Smoking status: Current Every Day Smoker     Packs/day: 0.50     Years: 46.00     Pack years: 23.00     Types: Cigarettes     Start date: 1975    Smokeless tobacco: Never Used   Vaping Use    Vaping Use: Never used   Substance and Sexual Activity    Alcohol use: Yes     Comment: 6 beers and 1 pint of vodka per day    Drug use: Yes     Types: Marijuana     Comment: occ    Sexual activity: Yes     Partners: Female   Other Topics Concern    None   Social History Narrative    None     Social Determinants of Health     Financial Resource Strain:     Difficulty of Paying Living Expenses:    Food Insecurity:     Worried About Running Out of Food in the Last Year:     Jennifer of Food in the Last Year:    Transportation Needs:     Lack of Transportation (Medical):  Lack of Transportation (Non-Medical):    Physical Activity:     Days of Exercise per Week:     Minutes of Exercise per Session:    Stress:     Feeling of Stress :    Social Connections:     Frequency of Communication with Friends and Family:     Frequency of Social Gatherings with Friends and Family:     Attends Faith Services:     Active Member of Clubs or Organizations:     Attends Club or Organization Meetings:     Marital Status:    Intimate Partner Violence:     Fear of Current or Ex-Partner:     Emotionally Abused:     Physically Abused:     Sexually Abused:         Review of Systems   10 total systems reviewed and found to be negative unless otherwise noted in HPI     Physical Exam   BP (!) 187/93   Pulse 63   Temp 98.1 °F (36.7 °C) (Oral)   Resp 15   Ht 5' 6\" (1.676 m)   Wt 142 lb 3 oz (64.5 kg)   SpO2 99%   BMI 22.95 kg/m²      CONSTITUTIONAL: Well appearing, in no acute distress   HEAD: atraumatic, normocephalic   EYES: PERRL, No injection, discharge or scleral icterus. ENT: Moist mucous membranes. NECK: Normal ROM, NO LAD   CARDIOVASCULAR: Regular rate and rhythm. No murmurs or gallop. PULMONARY/CHEST: Airway patent. No retractions. Breath sounds clear with good air entry bilaterally. ABDOMEN: Soft, Non-distended and non-tender, without guarding or rebound. SKIN: Acyanotic, warm, dry   MUSCULOSKELETAL: No swelling, tenderness or deformity   NEUROLOGICAL: Awake and oriented x 3. Pulses intact. Grossly nonfocal   Nursing note and vitals reviewed.      ED Course & Medical Decision Making   Medications   iopamidol (ISOVUE-370) 76 % injection 75 mL (75 mLs IntraVENous Given 9/26/21 1148)   oxyCODONE-acetaminophen (PERCOCET) 5-325 MG per tablet 1 tablet (1 tablet Oral Given 9/26/21 1211)      Labs Reviewed   CBC WITH AUTO DIFFERENTIAL - Abnormal; Notable for the following components:       Result Value    WBC 2.9 (*)     RBC 3.96 (*)     Hematocrit 40.2 (*)     .7 (*)     MCH 35.2 (*)     Lymphocytes Absolute 0.5 (*)     All other components within normal limits    Narrative:     Performed at:  [address] [facility]   Phone [phone]   COMPREHENSIVE METABOLIC PANEL W/ REFLEX TO MG FOR LOW K - Abnormal; Notable for the following components:    Anion Gap 17 (*)     Glucose 134 (*)     BUN 5 (*)     CREATININE <0.5 (*)     Total Bilirubin 1.4 (*)     Alkaline Phosphatase 134 (*)     ALT 64 (*)      (*)     All other components within normal limits    Narrative:     Performed at:  [address] [employer] [facility]   Phone [phone]   MAGNESIUM - Abnormal; Notable for the following components:    Magnesium 1.50 (*)     All other components within normal limits    Narrative:     Performed at:  [address] [facility] [employer] [facility]   Phone [phone]   TROPONIN    Narrative:     Performed at:  [facility]  [address] [place] [address] [employer]   Phone [phone]   BRAIN NATRIURETIC PEPTIDE    Narrative:     Performed at:  [facility]  [address] [place] [address] [facility]   Phone [phone]      CT CHEST PULMONARY EMBOLISM W CONTRAST   Preliminary Result   1. No evidence of pulmonary embolic disease. 2. No acute pulmonary findings. COPD. 3. Redemonstration of left thyroid nodule with substernal extension, recently   evaluated by thyroid ultrasound. 4. Atherosclerotic calcification in the aorta and coronary circulation.             Echo 2D w doppler w color complete    Result Date: 9/2/2021  Transthoracic Echocardiography Report (TTE)  Demographics   Patient noted.  Normal diastolic function. Mitral Valve  Mitral annular calcification is present. Mild thickening of leaflets of mitral valve. Trivial mitral regurgitation. No evidence of mitral stenosis. Left Atrium  Left atrium is of normal size. Aortic Valve  The aortic valve is tricuspid and structurally normal.  No evidence of aortic valve regurgitation. No evidence of aortic valve stenosis. Aorta  The aortic root is normal in size. The ascending aorta is normal in size. Right Ventricle  Normal right ventricular size and function. Right ventricular systolic function is normal.  TAPSE= 2.1cm   Tricuspid Valve  Mild tricuspid regurgitation. No evidence of tricuspid stenosis. Right Atrium  The right atrium is normal in size. Pulmonic Valve  Trivial pulmonic regurgitation present. No evidence of pulmonic valve stenosis. Pericardial Effusion  No pericardial effusion noted. Pleural Effusion  No pleural effusion. Miscellaneous  IVC size is normal (<2.1cm) and collapses > 50% with respiration consistent  with normal RA pressure (3mmHg). Estimated pulmonary artery systolic pressure is normal at 24 mmHg assuming a  right atrial pressure of 3 mmHg. A bubble study was performed and shows evidence of right to left shunting  consistent with a patent foramen ovale or atrial septal defect.   M-Mode/2D Measurements (cm)   LV Diastolic Dimension: 0.62 cm LV Systolic Dimension: 3.6 cm  LV Septum Diastolic: 9.74 cm  LV PW Diastolic: 2.73 cm        AO Root Dimension: 6.69 cm  RV Diastolic Dimension: 4.14 cm                                  LA Area: 18.1 cm2  LVOT: 2.08 cm                   LA volume/Index: 43.1 ml /25 ml/m2  Doppler Measurements   AV Peak Velocity: 116 cm/s     MV Peak E-Wave: 50.7 cm/s  AV Peak Gradient: 5.38 mmHg    MV Peak A-Wave: 51.4 cm/s  AV Mean Gradient: 3 mmHg       MV E/A Ratio: 0.99  LVOT Peak Velocity: 106 cm/s   MV P1/2t: 94 msec  AV Area (Continuity):3.04 cm2  MV Mean Gradient: 1 mmHg                                 MV Max P mmHg  TR Velocity:207 cm/s           MV Vmax:76.1 cm/s  TR Gradient:17.14 mmHg         MV VTI:19.2 cm/s  Estimated RAP:3 mmHg  E' Septal Velocity: 7.4 cm/s   MV Area (continuity): 3.47 cm2  E' Lateral Velocity: 10.3 cm/s MV Deceleration Time: 322 msec  PV Peak Velocity: 76.6 cm/s    MV Area (PHT): 2.34 cm2  PV Peak Gradient: 2.35 mmHg   Aortic Valve   Peak Velocity: 116 cm/s     Mean Velocity: 84.6 cm/s  Peak Gradient: 5.38 mmHg    Mean Gradient: 3 mmHg  Area (continuity): 3.04 cm2  AV VTI: 21.9 cm  Aorta   Aortic Root: 3.45 cm  Ascending Aorta: 3.28 cm  LVOT Diameter: 2.08 cm      CT Lung Screen (Annual)    Result Date: 2021  EXAMINATION: LOW DOSE SCREENING CT OF THE CHEST WITHOUT CONTRAST 2021 8:07 am TECHNIQUE: Low dose lung cancer screening CT of the chest was performed without the administration of intravenous contrast.  Multiplanar reformatted images are provided for review. Dose modulation, iterative reconstruction, and/or weight based adjustment of the mA/kV was utilized to reduce the radiation dose to as low as reasonably achievable. COMPARISON: 2020 HISTORY: ORDERING SYSTEM PROVIDED HISTORY: Personal history of tobacco use TECHNOLOGIST PROVIDED HISTORY: Age: Patient is 64 y.o. Smoking History: Social History Tobacco Use Smoking status: Current Every Day Smoker Packs/day: 0.50 Years: 46.00 Pack years: 23 Types: Cigarettes Start date:  Smokeless tobacco: Never Used Vaping Use Vaping Use: Never used Alcohol use: Yes Comment: 6 beers and 1 pint of vodka per day Drug use: Yes Types: Marijuana Comment: occ Pack years: 23 Date of last lung cancer screening: No previous lung cancer screening exam Is there documentation of shared decision making? ->Yes Is this a low dose CT or a routine CT?->Low Dose CT Is this the first (baseline) CT or an annual exam?->Baseline Does the patient show any signs or symptoms of lung cancer? ->No Smoking Status?->Current Every Day Smoker Smoking packs per day?->.5 Years smoking?->46 CTDlvol (mGy)->2.49 DLP (mGy*cm)->93.3 FINDINGS: Mediastinum:  There is asymmetric enlargement of the left lobe of the thyroid, displacing it to the right, suggesting multinodular goiter. Severe coronary artery calcification is seen. No pericardial effusion is seen. Small hiatal hernia seen. There is nonspecific thickening at the GE junction. No significant mediastinal adenopathy Lungs/Pleura: Moderate underlying emphysema is seen. No focal lung consolidation noted. No dominant pulmonary nodule on the right. No dominant pulmonary nodule on the left. Upper Abdomen:  Right adrenal gland is normal.  There is mild thickening left adrenal gland either due to adenoma or hyperplasia. Low attenuation is seen throughout the liver, compatible with fatty infiltration Soft Tissues/Bones: Scattered degenerative changes are seen in the spine and shoulder joints. Emphysema. No suspicious pulmonary nodules Severe coronary artery calcification LUNG RADS: Per ACR Lung-RADS Version 1.1 Category 1, Negative (No nodules and definitely benign nodules). Management: Continue annual lung screening with LDCT in 12 months. EKG INTERPRETATION:  EKG by my preliminary interpretation shows sinus rhythm with rate of 70, normal axis, normal intervals, with no ST changes indicative of ischemia at this time. PROCEDURES:   Procedures    ASSESSMENT AND PLAN:  Pratik Brian Sr. is a 64 y.o. male history of coronary artery disease, hypertension, hyperlipidemia who presents this afternoon complaining of back pain around the left scapula. Patient stated pain has been going on now for 1 week. Patient was seen here a month ago with similar presentation had a negative CT. exam she appears nontoxic but he is tender to palpation around the left scapula.   I did obtain labs again unremarkable CT of the chest to rule out dissection versus PE again was unremarkable. At this time I believe that his symptoms muscle pain. His EKG and troponin was normal.  Patient is stable no indication for admission discharged home with muscle relaxers. ClINICAL IMPRESSION:  1. Chronic left-sided low back pain without sciatica    2. Muscular pain          PATIENT REFERRED TO:  No follow-up provider specified. DISCHARGE MEDICATIONS:  New Prescriptions    CYCLOBENZAPRINE (FLEXERIL) 10 MG TABLET    Take 1 tablet by mouth 3 times daily as needed for Muscle spasms     DISCONTINUED MEDICATIONS:  Discontinued Medications    No medications on file     DISPOSITION    Discharged  -We have instructed the patient, Miguel Ángel Sutton ) to return to the ED or call his PCP if his pain/symptoms worsen. -Findings and recommendations explained to patient. He expressed understanding and agreed with the plan. Janes Arrieta MD (electronically signed)  9/26/2021  _________________________________________________________________________________________  _________________________________________________________________________________________  This record is transcribed utilizing voice recognition technology. There are inherent limitations in this technology. In addition, there may be limitations in editing of this report. If there are any discrepancies, please contact me directly.         Janes Arrieta MD  09/26/21 1257

## 2021-09-26 NOTE — ED TRIAGE NOTES
Pt here with left upper back pain x 1 week, denies any injury. Pt also admits to shortness of breath and cough.  Pt with vomiting x5 this am.

## 2021-09-26 NOTE — ED NOTES
Bed: B-07  Expected date: 9/26/21  Expected time: 9:52 AM  Means of arrival: [facility]  Comments:  62M back pain, emesis     Ky Barrientos RN  09/26/21 1008

## 2021-09-27 ENCOUNTER — OFFICE VISIT (OUTPATIENT)
Dept: FAMILY MEDICINE CLINIC | Age: 61
End: 2021-09-27
Payer: COMMERCIAL

## 2021-09-27 VITALS
BODY MASS INDEX: 22.98 KG/M2 | DIASTOLIC BLOOD PRESSURE: 86 MMHG | HEART RATE: 100 BPM | HEIGHT: 66 IN | OXYGEN SATURATION: 98 % | WEIGHT: 143 LBS | SYSTOLIC BLOOD PRESSURE: 114 MMHG | RESPIRATION RATE: 16 BRPM | TEMPERATURE: 98.3 F

## 2021-09-27 DIAGNOSIS — J43.8 OTHER EMPHYSEMA (HCC): ICD-10-CM

## 2021-09-27 DIAGNOSIS — E53.8 FOLATE DEFICIENCY: ICD-10-CM

## 2021-09-27 DIAGNOSIS — E04.1 THYROID NODULE: ICD-10-CM

## 2021-09-27 DIAGNOSIS — E83.42 HYPOMAGNESEMIA: ICD-10-CM

## 2021-09-27 DIAGNOSIS — S29.019A THORACIC MYOFASCIAL STRAIN, INITIAL ENCOUNTER: ICD-10-CM

## 2021-09-27 DIAGNOSIS — Z72.0 TOBACCO ABUSE: ICD-10-CM

## 2021-09-27 DIAGNOSIS — I25.10 CORONARY ARTERY DISEASE INVOLVING NATIVE CORONARY ARTERY OF NATIVE HEART WITHOUT ANGINA PECTORIS: Primary | ICD-10-CM

## 2021-09-27 DIAGNOSIS — R74.8 ELEVATED LIVER ENZYMES: ICD-10-CM

## 2021-09-27 DIAGNOSIS — R06.02 SHORTNESS OF BREATH: ICD-10-CM

## 2021-09-27 DIAGNOSIS — R73.9 HYPERGLYCEMIA: ICD-10-CM

## 2021-09-27 DIAGNOSIS — F10.10 ALCOHOL ABUSE: ICD-10-CM

## 2021-09-27 LAB
EKG ATRIAL RATE: 70 BPM
EKG DIAGNOSIS: NORMAL
EKG P AXIS: 91 DEGREES
EKG P-R INTERVAL: 132 MS
EKG Q-T INTERVAL: 456 MS
EKG QRS DURATION: 96 MS
EKG QTC CALCULATION (BAZETT): 492 MS
EKG R AXIS: 34 DEGREES
EKG T AXIS: 73 DEGREES
EKG VENTRICULAR RATE: 70 BPM

## 2021-09-27 PROCEDURE — 93010 ELECTROCARDIOGRAM REPORT: CPT | Performed by: INTERNAL MEDICINE

## 2021-09-27 PROCEDURE — 99214 OFFICE O/P EST MOD 30 MIN: CPT | Performed by: NURSE PRACTITIONER

## 2021-09-27 PROCEDURE — 3023F SPIROM DOC REV: CPT | Performed by: NURSE PRACTITIONER

## 2021-09-27 PROCEDURE — G8427 DOCREV CUR MEDS BY ELIG CLIN: HCPCS | Performed by: NURSE PRACTITIONER

## 2021-09-27 PROCEDURE — G8926 SPIRO NO PERF OR DOC: HCPCS | Performed by: NURSE PRACTITIONER

## 2021-09-27 PROCEDURE — 3017F COLORECTAL CA SCREEN DOC REV: CPT | Performed by: NURSE PRACTITIONER

## 2021-09-27 PROCEDURE — 4004F PT TOBACCO SCREEN RCVD TLK: CPT | Performed by: NURSE PRACTITIONER

## 2021-09-27 PROCEDURE — G8420 CALC BMI NORM PARAMETERS: HCPCS | Performed by: NURSE PRACTITIONER

## 2021-09-27 RX ORDER — BUDESONIDE AND FORMOTEROL FUMARATE DIHYDRATE 160; 4.5 UG/1; UG/1
2 AEROSOL RESPIRATORY (INHALATION) 2 TIMES DAILY
Qty: 1 EACH | Refills: 2 | Status: SHIPPED | OUTPATIENT
Start: 2021-09-27 | End: 2022-09-27 | Stop reason: SDUPTHER

## 2021-09-27 SDOH — ECONOMIC STABILITY: FOOD INSECURITY: WITHIN THE PAST 12 MONTHS, THE FOOD YOU BOUGHT JUST DIDN'T LAST AND YOU DIDN'T HAVE MONEY TO GET MORE.: PATIENT DECLINED

## 2021-09-27 SDOH — ECONOMIC STABILITY: FOOD INSECURITY: WITHIN THE PAST 12 MONTHS, YOU WORRIED THAT YOUR FOOD WOULD RUN OUT BEFORE YOU GOT MONEY TO BUY MORE.: PATIENT DECLINED

## 2021-09-27 ASSESSMENT — SOCIAL DETERMINANTS OF HEALTH (SDOH): HOW HARD IS IT FOR YOU TO PAY FOR THE VERY BASICS LIKE FOOD, HOUSING, MEDICAL CARE, AND HEATING?: PATIENT DECLINED

## 2021-09-27 ASSESSMENT — ENCOUNTER SYMPTOMS
NAUSEA: 0
CONSTIPATION: 0
COUGH: 0
VOMITING: 0
DIARRHEA: 0
BACK PAIN: 1
SHORTNESS OF BREATH: 1
CHEST TIGHTNESS: 0

## 2021-09-27 NOTE — PROGRESS NOTES
9/27/2021    This is a 64 y.o. male   Chief Complaint   Patient presents with    Chronic Pain     Patient is here for a 1 month follow up. Patient states that he did go to hospital yesterday day due to severe pain in his shoulder blade and chest and left elbow. Gregorio  HPI  CAD- seen cardiologist Dr. Dasia Zamudio-- completed echocardiogram and showed normal LV function but bubble study was abnormal. Has not yet scheduled with Dr. Catherine Roy or completed SOPHIA  History of abnormal severe coronary artery calcification per CT chest followed by lexiscan and echocardiogram which were within acceptable limits. Had left and right heart cath (8/2020) reveling non obstructive CAD and preserved left ventricular systolic function. Has been taking his ASA therapy. Never picked up his atorvastatin from his pharmacy. Hypertension:  Home blood pressure monitoring: No.  He is not adherent to a low sodium diet. Patient denies chest pain, headache, lightheadedness, peripheral edema and palpitations. Has been on amlodipine in the past, but currently off due to normal BP. Hyperlipidemia:  Reports that he has not been taking his atorvastatin as he did not  from the pharmacy. No results found for: LABA1C  Lab Results   Component Value Date    CREATININE <0.5 (L) 09/26/2021     Lab Results   Component Value Date    ALT 64 (H) 09/26/2021     (H) 09/26/2021     Lab Results   Component Value Date    HDL 89 (H) 06/18/2021    LDLCALC 74 06/18/2021        Elevated liver enzymes- Ordered abd u/s that showed fatty liver. Reports that he has decreased his drinking-- 3 beers per day and 1 shot of vodka per day. Has not been taking tylenol. Had appt with GI Dr. Erin Duff on 8/31/21 but did not go and hasn't rescheduled. Folate deficiency- has not yet picked up his folic acid prescription from his pharmacy.       Ordered PFT last visit due to his shortness of breath which showed normal study with bronchodilator response--may suggest underlying asthma. Reports that he continues with shortness of breath with any walking. Denies wheezing. Reports that he was smoking 1 ppd. Now he is down to 3/4 ppd. He did not obtain the nicoderm patches due to cost.   Chest xray normal 7/2020    Started on flovent and doing 2 puffs BID. Feels that it is helping slightly. Has not needed the albuterol inhaler. Will have yellow sputum production. Ordered thyroid u/s last visit for thyroid goiter which showed thyroid nodule. He reports that he has never had a biopsy. TSH normal 6/18/21   Appt with endo is not till Jan 2022    Patient reports that he started with left side chest pain and left upper back pain for the past week. Been taking ibuprofen. Went to ER yesterday for evaluation. ER gave him flexeril, but he just picked it up today. Having a hard time sleeping due to the pain. Been using a heating pad with some improvement in pain.       Patient Active Problem List   Diagnosis    Thyroid goiter    Chest pain    SOB (shortness of breath)    Essential hypertension    Elevated liver enzymes    Alcohol abuse    Folate deficiency    Coronary artery disease involving native coronary artery of native heart without angina pectoris    Other emphysema ([facility] [address]- seen on CT scan           Current Outpatient Medications   Medication Sig Dispense Refill    cyclobenzaprine (FLEXERIL) 10 MG tablet Take 1 tablet by mouth 3 times daily as needed for Muscle spasms 21 tablet 0    fluticasone (FLOVENT HFA) 110 MCG/ACT inhaler Inhale 2 puffs into the lungs 2 times daily Rinse mouth out after use 1 Inhaler 1    folic acid (FOLVITE) 1 MG tablet Take 1 tablet by mouth daily 90 tablet 1    atorvastatin (LIPITOR) 20 MG tablet Take 1 tablet by mouth nightly 30 tablet 3    nicotine (NICODERM CQ) 14 MG/24HR Place 1 patch onto the skin every 24 hours 30 patch 1    aspirin 81 MG chewable tablet Take 1 tablet by mouth daily 30 tablet 3     No current facility-administered medications for this visit. No Known Allergies    Review of Systems   Constitutional: Negative for activity change. Respiratory: Positive for shortness of breath. Negative for cough and chest tightness. Cardiovascular: Negative for chest pain and palpitations. Gastrointestinal: Negative for constipation, diarrhea, nausea and vomiting. Musculoskeletal: Positive for arthralgias, back pain and myalgias. Neurological: Negative for dizziness, syncope, weakness and headaches. Psychiatric/Behavioral: Negative for confusion. Vitals:    09/27/21 1038   BP: 114/86   Site: Left Upper Arm   Position: Sitting   Cuff Size: Large Adult   Pulse: 100   Resp: 16   Temp: 98.3 °F (36.8 °C)   TempSrc: Oral   SpO2: 98%   Weight: 143 lb (64.9 kg)   Height: 5' 6\" (1.676 m)       Body mass index is 23.08 kg/m². Wt Readings from Last 3 Encounters:   09/27/21 143 lb (64.9 kg)   09/26/21 142 lb 3 oz (64.5 kg)   08/20/21 141 lb (64 kg)       BP Readings from Last 3 Encounters:   09/27/21 114/86   09/26/21 (!) 188/94   08/20/21 138/80       Physical Exam  Vitals and nursing note reviewed. Constitutional:       General: He is not in acute distress. Appearance: He is well-developed. HENT:      Head: Normocephalic and atraumatic. Eyes:      Extraocular Movements: Extraocular movements intact. Conjunctiva/sclera: Conjunctivae normal.   Neck:      Thyroid: Thyromegaly present. Cardiovascular:      Rate and Rhythm: Normal rate and regular rhythm. Heart sounds: Normal heart sounds. No murmur heard. No friction rub. No gallop. Pulmonary:      Effort: Pulmonary effort is normal. No respiratory distress. Breath sounds: Normal breath sounds. Musculoskeletal:      Cervical back: Neck supple. Right lower leg: No edema. Left lower leg: No edema. Comments: Left thoracic spine tender to palpation. Has full left shoulder ROM.     Skin:     General: Skin is warm and dry. Neurological:      Mental Status: He is alert and oriented to person, place, and time. Psychiatric:         Behavior: Behavior normal.         Thought Content: Thought content normal.         Judgment: Judgment normal.         Assessmentand Plan  Marlin Segovia was seen today for follow-up. Diagnoses and all orders for this visit:    Coronary artery disease involving native coronary artery of native heart without angina pectoris  Left and right heart cath from 8/2020 showed non obstructive CAD. Advised to take ASA 81 mg daily and atorvastatin 20 mg daily  Advised smoking cessation. Continue f/u with cardiologist Dr. Owens Guardian of breath/ emphysema   PFT was suggestive of possible asthma. CT lung screening showed emphysema. D/c flovent and start symbicort 2 puffs BID. Rinse mouth out after use. Albuterol inhaler PRN    Elevated liver enzymes: liver function from yesterday was better than previous testing in Aug.   Liver u/s showed fatty liver. Should continue to work on decreasing alcohol intake  Advised to avoid tylenol  Needs to f/u with GI     Alcohol abuse  Advised to continue to decrease alcohol intake    Thyroid nodule: not able to see endo till Jan 2022. Advised to have FNA with IR in hospital prior to eval with endo. -     IR GUIDED FNA; Future    Folate deficiency  Advised to start folic acid 1 mg daily as previously been ordered     Tobacco abuse  Smoking cessation discussed and needed. Reports that nicoderm was too expensive. He is working on decreasing     Hypomagnesemia  -     MAGNESIUM; Future  Noted on lab from yesterday-- repeat     Thoracic myofascial strain, initial encounter  Picked up the flexeril today. Advised to alternate between ice and heat PRN  Advised to avoid tylenol  May need referral to PT if pain is not improving. Hyperglycemia  -     Hemoglobin A1C; Future  Noted on previous lab.      Advised to obtain pneumovax and flu vaccine at his pharmacy    Return in about 6 weeks (around 11/8/2021), or if symptoms worsen or fail to improve, for chronic conditions.

## 2021-09-28 DIAGNOSIS — E83.42 HYPOMAGNESEMIA: Primary | ICD-10-CM

## 2021-09-28 RX ORDER — CALCIUM CARBONATE 300MG(750)
1 TABLET,CHEWABLE ORAL DAILY
Qty: 30 TABLET | Refills: 0 | Status: SHIPPED | OUTPATIENT
Start: 2021-09-28 | End: 2021-10-28 | Stop reason: SDUPTHER

## 2021-10-01 ENCOUNTER — TELEPHONE (OUTPATIENT)
Dept: FAMILY MEDICINE CLINIC | Age: 61
End: 2021-10-01

## 2021-10-01 NOTE — TELEPHONE ENCOUNTER
[facility], [address] [place] [address] [place] [address]   Phone: [phone]   Fax: [phone]    Pt given referral info for Dr Richelle Stubbs

## 2021-10-01 NOTE — TELEPHONE ENCOUNTER
Pt friend called in said they were given a phone number on a referral for the pt to get his liver tested its a non working number I only see a thyroid referral     Please advise

## 2021-10-07 ENCOUNTER — TELEPHONE (OUTPATIENT)
Dept: FAMILY MEDICINE CLINIC | Age: 61
End: 2021-10-07

## 2021-10-07 NOTE — TELEPHONE ENCOUNTER
Patient calling would like to make an appt for eye exam. deliliah calling would like recommendation for patient to see an eye doctor. Patient is having promblems with blurry vision, migraines, hearing trouble for the past month.      Do we have recommendation for patient to go to get eyes examined, or does patient need to come in to make appt for the migraines, and check ears first?     Please advise

## 2021-10-07 NOTE — TELEPHONE ENCOUNTER
I spoke to patient and friend to ask what symptoms patient is having. Patient is having issues being able to hold anything in left hand x2-3 days. Friend says patient went to ED for this on Sunday. I advised patient went to ED on 9/26 and that was for back pain not this issue. I advised again if they ere concerned of stroke they would need to go to the ER.  Stated might go later

## 2021-10-07 NOTE — TELEPHONE ENCOUNTER
Pt friend called in stated the pt has been having issues for a month stated she believes he had a mini stroke advised eduardo Morrissey to go to the ER

## 2021-10-07 NOTE — TELEPHONE ENCOUNTER
I spoke to friend of patient and patient and advised they do not need a referral to see eye doctor, they just need to find out who is covered on his insurance. They said they were calling to schedule with [facility]. I advised they would need to call them and schedule. Number provided. I scheduled patient for 6 week follow up appointment and advised we can check ears and give referral to ENT if necessary at that time.

## 2021-10-19 ENCOUNTER — APPOINTMENT (OUTPATIENT)
Dept: GENERAL RADIOLOGY | Age: 61
End: 2021-10-19
Payer: COMMERCIAL

## 2021-10-19 ENCOUNTER — HOSPITAL ENCOUNTER (EMERGENCY)
Age: 61
Discharge: HOME | End: 2021-10-19
Attending: EMERGENCY MEDICINE
Payer: COMMERCIAL

## 2021-10-19 VITALS
HEIGHT: 67 IN | TEMPERATURE: 98.5 F | BODY MASS INDEX: 22.11 KG/M2 | SYSTOLIC BLOOD PRESSURE: 178 MMHG | HEART RATE: 81 BPM | DIASTOLIC BLOOD PRESSURE: 86 MMHG | RESPIRATION RATE: 11 BRPM | WEIGHT: 140.87 LBS | OXYGEN SATURATION: 97 %

## 2021-10-19 DIAGNOSIS — R07.9 CHEST PAIN, UNSPECIFIED TYPE: Primary | ICD-10-CM

## 2021-10-19 DIAGNOSIS — I10 ELEVATED BLOOD PRESSURE READING IN OFFICE WITH DIAGNOSIS OF HYPERTENSION: ICD-10-CM

## 2021-10-19 DIAGNOSIS — F10.10 ALCOHOL ABUSE: ICD-10-CM

## 2021-10-19 LAB
ALBUMIN SERPL-MCNC: 4.7 G/DL (ref 3.4–5)
ALP BLD-CCNC: 126 U/L (ref 40–129)
ALT SERPL-CCNC: 116 U/L (ref 10–40)
ANION GAP SERPL CALCULATED.3IONS-SCNC: 24 MMOL/L (ref 3–16)
AST SERPL-CCNC: 174 U/L (ref 15–37)
ATYPICAL LYMPHOCYTE RELATIVE PERCENT: 2 % (ref 0–6)
BANDED NEUTROPHILS RELATIVE PERCENT: 1 % (ref 0–7)
BASOPHILS ABSOLUTE: 0 K/UL (ref 0–0.2)
BASOPHILS RELATIVE PERCENT: 0 %
BILIRUB SERPL-MCNC: 0.9 MG/DL (ref 0–1)
BILIRUBIN DIRECT: <0.2 MG/DL (ref 0–0.3)
BILIRUBIN, INDIRECT: ABNORMAL MG/DL (ref 0–1)
BUN BLDV-MCNC: 6 MG/DL (ref 7–20)
CALCIUM SERPL-MCNC: 9.5 MG/DL (ref 8.3–10.6)
CHLORIDE BLD-SCNC: 98 MMOL/L (ref 99–110)
CO2: 15 MMOL/L (ref 21–32)
CREAT SERPL-MCNC: 0.6 MG/DL (ref 0.8–1.3)
EKG ATRIAL RATE: 97 BPM
EKG DIAGNOSIS: NORMAL
EKG P AXIS: 86 DEGREES
EKG P-R INTERVAL: 132 MS
EKG Q-T INTERVAL: 378 MS
EKG QRS DURATION: 80 MS
EKG QTC CALCULATION (BAZETT): 480 MS
EKG R AXIS: 26 DEGREES
EKG T AXIS: 85 DEGREES
EKG VENTRICULAR RATE: 97 BPM
EOSINOPHILS ABSOLUTE: 0 K/UL (ref 0–0.6)
EOSINOPHILS RELATIVE PERCENT: 0 %
GFR AFRICAN AMERICAN: >60
GFR NON-AFRICAN AMERICAN: >60
GLUCOSE BLD-MCNC: 101 MG/DL (ref 70–99)
GLUCOSE BLD-MCNC: 89 MG/DL (ref 70–99)
HCT VFR BLD CALC: 44.3 % (ref 40.5–52.5)
HEMOGLOBIN: 14.8 G/DL (ref 13.5–17.5)
LYMPHOCYTES ABSOLUTE: 1.2 K/UL (ref 1–5.1)
LYMPHOCYTES RELATIVE PERCENT: 41 %
MACROCYTES: ABNORMAL
MCH RBC QN AUTO: 34.1 PG (ref 26–34)
MCHC RBC AUTO-ENTMCNC: 33.4 G/DL (ref 31–36)
MCV RBC AUTO: 102.1 FL (ref 80–100)
MONOCYTES ABSOLUTE: 0.6 K/UL (ref 0–1.3)
MONOCYTES RELATIVE PERCENT: 21 %
NEUTROPHILS ABSOLUTE: 1 K/UL (ref 1.7–7.7)
NEUTROPHILS RELATIVE PERCENT: 35 %
PDW BLD-RTO: 14.4 % (ref 12.4–15.4)
PERFORMED ON: ABNORMAL
PLATELET # BLD: 194 K/UL (ref 135–450)
PLATELET SLIDE REVIEW: ADEQUATE
PMV BLD AUTO: 7.7 FL (ref 5–10.5)
POTASSIUM REFLEX MAGNESIUM: 4.6 MMOL/L (ref 3.5–5.1)
RBC # BLD: 4.34 M/UL (ref 4.2–5.9)
SLIDE REVIEW: ABNORMAL
SODIUM BLD-SCNC: 137 MMOL/L (ref 136–145)
TOTAL PROTEIN: 7.8 G/DL (ref 6.4–8.2)
TROPONIN: <0.01 NG/ML
WBC # BLD: 2.8 K/UL (ref 4–11)

## 2021-10-19 PROCEDURE — 36415 COLL VENOUS BLD VENIPUNCTURE: CPT

## 2021-10-19 PROCEDURE — 6370000000 HC RX 637 (ALT 250 FOR IP): Performed by: EMERGENCY MEDICINE

## 2021-10-19 PROCEDURE — 71046 X-RAY EXAM CHEST 2 VIEWS: CPT

## 2021-10-19 PROCEDURE — 80048 BASIC METABOLIC PNL TOTAL CA: CPT

## 2021-10-19 PROCEDURE — 2500000003 HC RX 250 WO HCPCS: Performed by: PHYSICIAN ASSISTANT

## 2021-10-19 PROCEDURE — 2580000003 HC RX 258: Performed by: PHYSICIAN ASSISTANT

## 2021-10-19 PROCEDURE — 93005 ELECTROCARDIOGRAM TRACING: CPT | Performed by: EMERGENCY MEDICINE

## 2021-10-19 PROCEDURE — 93010 ELECTROCARDIOGRAM REPORT: CPT | Performed by: INTERNAL MEDICINE

## 2021-10-19 PROCEDURE — 6360000002 HC RX W HCPCS: Performed by: PHYSICIAN ASSISTANT

## 2021-10-19 PROCEDURE — 96365 THER/PROPH/DIAG IV INF INIT: CPT

## 2021-10-19 PROCEDURE — 6370000000 HC RX 637 (ALT 250 FOR IP): Performed by: PHYSICIAN ASSISTANT

## 2021-10-19 PROCEDURE — 85025 COMPLETE CBC W/AUTO DIFF WBC: CPT

## 2021-10-19 PROCEDURE — 99285 EMERGENCY DEPT VISIT HI MDM: CPT

## 2021-10-19 PROCEDURE — 84484 ASSAY OF TROPONIN QUANT: CPT

## 2021-10-19 PROCEDURE — 96375 TX/PRO/DX INJ NEW DRUG ADDON: CPT

## 2021-10-19 PROCEDURE — 80076 HEPATIC FUNCTION PANEL: CPT

## 2021-10-19 PROCEDURE — 6360000002 HC RX W HCPCS: Performed by: EMERGENCY MEDICINE

## 2021-10-19 RX ORDER — KETOROLAC TROMETHAMINE 30 MG/ML
15 INJECTION, SOLUTION INTRAMUSCULAR; INTRAVENOUS ONCE
Status: COMPLETED | OUTPATIENT
Start: 2021-10-19 | End: 2021-10-19

## 2021-10-19 RX ORDER — LIDOCAINE 4 G/G
1 PATCH TOPICAL ONCE
Status: DISCONTINUED | OUTPATIENT
Start: 2021-10-19 | End: 2021-10-19 | Stop reason: HOSPADM

## 2021-10-19 RX ORDER — DIAZEPAM 5 MG/1
5 TABLET ORAL ONCE
Status: COMPLETED | OUTPATIENT
Start: 2021-10-19 | End: 2021-10-19

## 2021-10-19 RX ORDER — ACETAMINOPHEN 325 MG/1
650 TABLET ORAL ONCE
Status: COMPLETED | OUTPATIENT
Start: 2021-10-19 | End: 2021-10-19

## 2021-10-19 RX ADMIN — ACETAMINOPHEN 650 MG: 325 TABLET ORAL at 12:36

## 2021-10-19 RX ADMIN — FOLIC ACID: 5 INJECTION, SOLUTION INTRAMUSCULAR; INTRAVENOUS; SUBCUTANEOUS at 14:15

## 2021-10-19 RX ADMIN — DIAZEPAM 5 MG: 5 TABLET ORAL at 13:59

## 2021-10-19 RX ADMIN — KETOROLAC TROMETHAMINE 15 MG: 30 INJECTION, SOLUTION INTRAMUSCULAR at 12:36

## 2021-10-19 ASSESSMENT — ENCOUNTER SYMPTOMS
COUGH: 0
ABDOMINAL PAIN: 0
SHORTNESS OF BREATH: 0
COLOR CHANGE: 0
NAUSEA: 0
VOMITING: 0
BACK PAIN: 1

## 2021-10-19 ASSESSMENT — PAIN DESCRIPTION - PROGRESSION
CLINICAL_PROGRESSION: NOT CHANGED
CLINICAL_PROGRESSION: GRADUALLY IMPROVING

## 2021-10-19 ASSESSMENT — PAIN DESCRIPTION - DESCRIPTORS
DESCRIPTORS: SHARP

## 2021-10-19 ASSESSMENT — PAIN SCALES - GENERAL
PAINLEVEL_OUTOF10: 6
PAINLEVEL_OUTOF10: 0
PAINLEVEL_OUTOF10: 10

## 2021-10-19 ASSESSMENT — PAIN DESCRIPTION - ORIENTATION
ORIENTATION: LEFT

## 2021-10-19 ASSESSMENT — PAIN DESCRIPTION - PAIN TYPE
TYPE: ACUTE PAIN

## 2021-10-19 ASSESSMENT — PAIN DESCRIPTION - LOCATION
LOCATION: CHEST;BACK
LOCATION: CHEST;BACK
LOCATION: CHEST
LOCATION: CHEST

## 2021-10-19 ASSESSMENT — PAIN DESCRIPTION - FREQUENCY: FREQUENCY: CONTINUOUS

## 2021-10-19 NOTE — ED TRIAGE NOTES
Pt arrived to dept via EMS from home. Pt c/o non-radiating, left sided, chest pain x 1 week with shaking starting this morning. Denies alcohol use at triage. States that the chest pain causes him to be SOB but he has no nausea, dizziness or diaphoresis. Pt awake, alert and oriented x 3. Skin warm and dry/normal color for ethnicity. Resp easy and unlabored. Pt placed in gown and on cardiac monitor. Call light in reach. Will continue to monitor.

## 2021-10-19 NOTE — ED PROVIDER NOTES
[address] [facility]        Pt Name: Serafin Brito. MRN: [id number]  Birthdate 1960  Date of evaluation: 10/19/2021  Provider: KAYLIE Bae  PCP: JAMMIE Ayala CNP  Note Started: 3:03 PM EDT        I have seen and evaluated this patient with my supervising physician Ciera Lobo MD.    [facility]       Chief Complaint   Patient presents with    Chest Pain     Tremors and Chest Pain since this AM @ 0800 when he woke up, state has hole in his heart.  Shaking       HISTORY OF PRESENT ILLNESS   (Location, Timing/Onset, Context/Setting, Quality, Duration, Modifying Factors, Severity, Associated Signs and Symptoms)  Note limiting factors. Chief Complaint: shaking, chest pain     Serafin Brito is a 64 y.o. male who presents to the emergency department today via EMS with complaints of tremors, chest pain. The patient states his symptoms started this morning at 8:00 when he woke up, then later tells me that he has had persistent symptoms since being seen in the emergency department about 2 weeks ago for similar complaints. He admits to drinking alcohol yesterday. He states he has not been drinking as heavily as he normally does for the last 3 weeks. He states that he has no abdominal pain, nausea or vomiting. He has no further complaints at this time. Nursing Notes were all reviewed and agreed with or any disagreements were addressed in the HPI. REVIEW OF SYSTEMS    (2-9 systems for level 4, 10 or more for level 5)     Review of Systems   Constitutional: Negative for chills and fever. Respiratory: Negative for cough and shortness of breath. Cardiovascular: Positive for chest pain. Negative for palpitations. Gastrointestinal: Negative for abdominal pain, nausea and vomiting. Musculoskeletal: Positive for back pain. Negative for arthralgias.    Skin: Negative for color change and (36.9 °C) Oral 100 (!) 41 100 % 5' 7\" (1.702 m) 140 lb 14 oz (63.9 kg)       Physical Exam  Vitals and nursing note reviewed. Constitutional:       General: He is not in acute distress. Appearance: Normal appearance. He is well-developed. He is not ill-appearing, toxic-appearing or diaphoretic. HENT:      Head: Normocephalic and atraumatic. Eyes:      Conjunctiva/sclera: Conjunctivae normal.      Pupils: Pupils are equal, round, and reactive to light. Cardiovascular:      Rate and Rhythm: Normal rate and regular rhythm. Pulses: Normal pulses. Pulmonary:      Effort: Pulmonary effort is normal. No respiratory distress. Breath sounds: Normal breath sounds. No stridor. Chest:      Chest wall: Tenderness present. No deformity, swelling or crepitus. Abdominal:      General: Bowel sounds are normal.      Palpations: Abdomen is soft. Musculoskeletal:      Cervical back: Normal, normal range of motion and neck supple. Thoracic back: Tenderness present. Normal range of motion. Lumbar back: Normal.        Back:       Right lower leg: No edema. Left lower leg: No edema. Skin:     General: Skin is warm and dry. Neurological:      Mental Status: He is alert and oriented to person, place, and time. GCS: GCS eye subscore is 4. GCS verbal subscore is 5. GCS motor subscore is 6. Cranial Nerves: Cranial nerves are intact. No cranial nerve deficit. Sensory: Sensation is intact. Motor: No weakness. Comments: With tremors - but distractable and will hold still when asked to stay still   Psychiatric:         Behavior: Behavior normal. Behavior is cooperative. Thought Content:  Thought content normal.         DIAGNOSTIC RESULTS   LABS:    Labs Reviewed   CBC WITH AUTO DIFFERENTIAL - Abnormal; Notable for the following components:       Result Value    WBC 2.8 (*)     .1 (*)     MCH 34.1 (*)     Neutrophils Absolute 1.0 (*)     Macrocytes Occasional (*)     All other components within normal limits    Narrative:     Performed at:  [facility]  [address] [facility] [address]   Phone [phone]   BASIC METABOLIC PANEL W/ REFLEX TO MG FOR LOW K - Abnormal; Notable for the following components:    Chloride 98 (*)     CO2 15 (*)     Anion Gap 24 (*)     BUN 6 (*)     CREATININE 0.6 (*)     All other components within normal limits    Narrative:     Performed at:  [facility]  [address] [facility] [address]   Phone [phone]   HEPATIC FUNCTION PANEL - Abnormal; Notable for the following components:     (*)      (*)     All other components within normal limits    Narrative:     Performed at:  [facility]  [address] [facility] [address]   Phone [phone]   POCT GLUCOSE - Abnormal; Notable for the following components:    POC Glucose 101 (*)     All other components within normal limits    Narrative:     Performed at:  [facility]  [address] [facility] [employer] [address]   Phone [phone]   TROPONIN    Narrative:     Performed at:  [facility]  [address] [place] [address]   Phone [phone]       When ordered only abnormal lab results are displayed. All other labs were within normal range or not returned as of this dictation. EKG: When ordered, EKG's are interpreted by the Emergency Department Physician in the absence of a cardiologist.  Please see their note for interpretation of EKG.     RADIOLOGY:   Non-plain film images such as CT, Ultrasound and MRI are read by the radiologist. Plain radiographic images are visualized and preliminarily interpreted by the ED Provider with the below findings:    Interpretation per the Radiologist below, if available at the time of this note:    XR CHEST (2 VW)   Final Result   No acute abnormality           XR CHEST (2 VW)    Result Date: 10/19/2021  EXAMINATION: TWO XRAY VIEWS OF THE CHEST 10/19/2021 12:07 pm COMPARISON: 07/11/2020 HISTORY: ORDERING SYSTEM PROVIDED HISTORY: Chest Pain TECHNOLOGIST PROVIDED HISTORY: Reason for exam:->Chest Pain Reason for Exam: chest pain Acuity: Acute Type of Exam: Initial FINDINGS: The heart and pulmonary vascularity are within normal limits. There are no focal areas of consolidation or pleural effusion. The osseous structures are intact. No acute abnormality           PROCEDURES   Unless otherwise noted below, none     Procedures    CRITICAL CARE TIME   N/A    CONSULTS:  None      EMERGENCY DEPARTMENT COURSE and DIFFERENTIAL DIAGNOSIS/MDM:   Vitals:    Vitals:    10/19/21 1200 10/19/21 1230 10/19/21 1315 10/19/21 1345   BP: (!) 154/89 (!) 161/89 (!) 169/88 (!) 166/87   Pulse: 91 94 88 87   Resp: 18 21 16 11   Temp:       TempSrc:       SpO2: 97% 96% 96% 95%   Weight:       Height:           Patient was given the following medications:  Medications   lidocaine 4 % external patch 1 patch (1 patch TransDERmal Patch Applied 10/19/21 1235)   ketorolac (TORADOL) injection 15 mg (15 mg IntraVENous Given 10/19/21 1236)   acetaminophen (TYLENOL) tablet 650 mg (650 mg Oral Given 10/19/21 1236)   diazePAM (VALIUM) tablet 5 mg (5 mg Oral Given 10/19/21 1359)   sodium chloride 0.9 % 4,089 mL with folic acid 1 mg, adult multi-vitamin with vitamin k 10 mL, thiamine 100 mg ( IntraVENous Rate/Dose Verify 10/19/21 1417)           ED COURSE & MEDICAL DECISION MAKING    - The patient presented to the ER with complaints of shaking, chest pain. Vital signs were reviewed. Exam well-developed, well-nourished male, who is shaking, but it is distractible. Peripheral IV placed. Labs, Imaging ordered. - Pertinent Labs & Imaging studies reviewed. (See chart for details)   -  Patient seen and evaluated in the emergency department.   -  Triage and nursing notes reviewed and incorporated. -  Old chart records reviewed and incorporated. -   I have seen and evaluated this patient with my supervising physician Leeann Schulz MD.  -  Differential diagnosis includes:  infection/sepsis, medication side effect, intoxication/withdrawal, metabolic/electrolyte abnormalities  -  Work-up included:  See above  -  ED treatment included:  Tylenol, toradol, lidocaine patch, Valium, Banana bag  -  Results discussed with patient and/or family. Labs show white blood cell count of 2.8, no concerning anemia. Metabolic panel with no concerning abnormalities. His troponin is less than 0.01, LFTs are hemolyzed. . Imaging studies show no acute abnormality. Please see attending physician's note for EKG interpretation. Patient feels improved. At this time, we recommend discharge, as the patient is stable for outpatient follow up. He is encouraged to quit drinking. The patient and/or family is agreeable with plan of care and disposition.  -  Disposition:  Discharge home in stable condition  - Critical Care: Because of high probability of sudden clinical deterioration of the patient's condition or  further deterioration, critical care time included my full attention to the patient's condition, including chart data review, documentation, medication ordering, reviewing the patient's old records, reevaluation patient's cardiac, pulmonary and neurological status. Reassessment of vital signs. Consultations with ED attending and admitting physician. Ordering, interpreting reviewing diagnostic testing. Therefore, my critical care time was 13 minutes of direct attention to the patient's condition did not include time spent on separately billable procedures. FINAL IMPRESSION      1. Chest pain, unspecified type    2. Alcohol abuse    3.  Elevated blood pressure reading in office with diagnosis of hypertension          DISPOSITION/PLAN   DISPOSITION Decision To Discharge 10/19/2021 03:07:41 PM      PATIENT REFERRED TO:  Mekhi Irby, APRN - CNP  [address] [address] [address] [place] [address]  [phone]    Schedule an appointment as soon as possible for a visit   For follow up appointment      DISCHARGE MEDICATIONS:  New Prescriptions    No medications on file       DISCONTINUED MEDICATIONS:  Discontinued Medications    No medications on file              (Please note that portions of this note were completed with a voice recognition program.  Efforts were made to edit the dictations but occasionally words are mis-transcribed.)    KAYLIE Antony (electronically signed)            Rogerio Ackerman, [address]  10/19/21 1528

## 2021-10-19 NOTE — ED NOTES
Bed: D-50  Expected date: 10/19/21  Expected time:   Means of arrival: [facility]  Comments:  leanne Hsu RN  10/19/21 4749

## 2021-10-19 NOTE — ED NOTES

## 2021-10-19 NOTE — ED PROVIDER NOTES
Date of evaluation: 10/19/2021    ED Attending Attestation Note     CHIEF COMPLAINT     I woke up this morning and had some pain in my chest and was feeling shakey. HISTORY OF PRESENT ILLNESS  (Location/Symptom, Timing/Onset,Context/Setting, Quality, Duration, Modifying Factors, Severity). Note limiting factors. This patient was seen by the advance practice provider. I have seen and examined the patient, agree with the workup, evaluation, management and diagnosis. The care plan has been discussed. Chief Complaint   Patient presents with    Chest Pain     Tremors and Chest Pain since this AM @ 0800 when he woke up, state has hole in his heart.  Dora Morris Sr. is a 64 y.o. male who presents to the emergency department secondary to concern for shaking and chest pain and back pain. Seen for similar symptoms on 9.26.2021. He does deny any fevers, chills, nausea, vomiting. Past medical history significant for coronary artery disease, hyperlipidemia, hypertension.      Social History     Socioeconomic History    Marital status: Single     Spouse name: None    Number of children: 1    Years of education: None    Highest education level: None   Occupational History    None   Tobacco Use    Smoking status: Current Every Day Smoker     Packs/day: 0.50     Years: 46.00     Pack years: 23.00     Types: Cigarettes     Start date: 1975    Smokeless tobacco: Never Used   Vaping Use    Vaping Use: Never used   Substance and Sexual Activity    Alcohol use: Yes     Comment: 6 beers and 1 pint of vodka per day    Drug use: Yes     Types: Marijuana     Comment: occ    Sexual activity: Yes     Partners: Female   Other Topics Concern    None   Social History Narrative    None     Social Determinants of Health     Financial Resource Strain: Unknown    Difficulty of Paying Living Expenses: Patient refused   Food Insecurity: Unknown    Worried About Running Out of Food in the Last Year: Patient refused   [address] in the Last Year: Patient refused   Transportation Needs:     Lack of Transportation (Medical):  Lack of Transportation (Non-Medical):    Physical Activity:     Days of Exercise per Week:     Minutes of Exercise per Session:    Stress:     Feeling of Stress :    Social Connections:     Frequency of Communication with Friends and Family:     Frequency of Social Gatherings with Friends and Family:     Attends Gnosticist Services:     Active Member of Clubs or Organizations:     Attends Club or Organization Meetings:     Marital Status:    Intimate Partner Violence:     Fear of Current or Ex-Partner:     Emotionally Abused:     Physically Abused:     Sexually Abused:      Aside from what is stated above denies any other symptoms or modifying factors. Nursing Notes reviewed. History reviewed. No pertinent surgical history. Family History   Problem Relation Age of Onset    Diabetes Mother     Liver Disease Father     Liver Disease Brother        CURRENT MEDICATIONS       Previous Medications    ASPIRIN 81 MG CHEWABLE TABLET    Take 1 tablet by mouth daily    ATORVASTATIN (LIPITOR) 20 MG TABLET    Take 1 tablet by mouth nightly    BUDESONIDE-FORMOTEROL (SYMBICORT) 160-4.5 MCG/ACT AERO    Inhale 2 puffs into the lungs 2 times daily For long-term control of COPD symptoms. FOLIC ACID (FOLVITE) 1 MG TABLET    Take 1 tablet by mouth daily    MAGNESIUM 400 MG TABS    Take 1 tablet by mouth daily    NICOTINE (NICODERM CQ) 14 MG/24HR    Place 1 patch onto the skin every 24 hours      DIAGNOSTIC RESULTS   EKG: interpreted by the Emergency Department Physician who either signs or Co-signs this chart in the absence of a cardiologist.  EKG Indication: Chest pain  EKG Interpretation: Rate 97, rhythm sinus, axis normal.  OR/QRS within normal limits. QTc prolonged 480. T wave abnormality noted.   Comparison to prior EKG from September 26, 2021 shows no significant change. RADIOLOGY:   Interpretation per Radiologist below, if available at the time of this note:  XR CHEST (2 VW)   Final Result   No acute abnormality           Patient was given the following medications:  Orders Placed This Encounter   Medications    ketorolac (TORADOL) injection 15 mg    acetaminophen (TYLENOL) tablet 650 mg    lidocaine 4 % external patch 1 patch    diazePAM (VALIUM) tablet 5 mg    sodium chloride 0.9 % 5,426 mL with folic acid 1 mg, adult multi-vitamin with vitamin k 10 mL, thiamine 100 mg       INITIAL VITALS: BP: (!) 154/71, Temp: 98.5 °F (36.9 °C), Pulse: 100, Resp: (!) 41, SpO2: 100 %   RECENT VITALS:  BP: (!) 166/87,Temp: 98.5 °F (36.9 °C), Pulse: 87, Resp: 11, SpO2: 95 %     My assessment reveals a chronically ill appearing black male who does not appear systemically ill or toxic. He answers questions in complete sentences. No increased work of breathing. Abdomen is benign. Of note the patient has a history of alcohol abuse. He initially told the nurse he had not been drinking anymore. He told the TSEVE he only had 1 shot yesterday while watching football. When I spoke with him and expressed my concerns regarding his history of alcohol use and how he had gone down from a pint a day to half a pint +3 beers (as seen in the medical notes from his primary care) and asked where he was at now he stated he was getting better though last night he had half a pint which was more than he been drinking recently. He has some tremors noted here however they are distractible. His work-up here is reassuring. He was ordered 5 mg p.o. Valium and a rally pack. On reassessment he reported feeling better. We discussed importance of follow-up primary care, return precautions, he was discharged home in stable condition. FINAL IMPRESSION      1. Chest pain, unspecified type    2. Alcohol abuse    3.  Elevated blood pressure reading in office with diagnosis of hypertension DISPOSITION/PLAN   DISPOSITION Decision To Discharge 10/19/2021 03:07:41 PM      PATIENT REFERRED TO:  [place] Alexandro, APRN - CNP  [address] [address]  [phone]    Schedule an appointment as soon as possible for a visit   For follow up appointment      DISCHARGE MEDICATIONS:  New Prescriptions    No medications on file            (Please note that portions of this note were completed with a voice recognition program. Efforts were made to edit the dictations but occasionally words are mis-transcribed.)    Carolynn Hurtado MD (electronically signed)  Attending Emergency Physician       Carolynn Hurtado MD  10/19/21 9935

## 2021-10-19 NOTE — ED NOTES
Discharge and education instructions reviewed. Patient verbalized understanding, teach-back successful. Patient denied questions at this time. No acute distress noted. Patient instructed to follow-up as noted - return to emergency department if symptoms worsen. Patient verbalized understanding. Discharged per EDMD with discharge instructions.           Anh Zamora RN  10/19/21 3164

## 2021-10-19 NOTE — ED NOTES
Verbal orders received from [facility] CÉSAR to administer banana bag at 1000 ml/hr. Rate changed on pump.      Josefina Vazquez, RN  10/19/21 5222

## 2021-10-20 ENCOUNTER — TELEPHONE (OUTPATIENT)
Dept: FAMILY MEDICINE CLINIC | Age: 61
End: 2021-10-20

## 2021-10-20 NOTE — TELEPHONE ENCOUNTER
Patient assistant calling wanting to speak to ma, or maya keys regarding medication. Patient was recently in ed . Offered to schedule an ed follow up denied wanting to speak to ma . Advised have to speak to patient do not have a Hippa Form that is signed. Please give patient a call back to go over medications would not give any other information.      Please advise

## 2021-10-21 NOTE — PROGRESS NOTES
[address]  Cardiology Consult    Erika Chen .  1960 October 21, 2021      Reason for Referral: PFO/ASD    CC: \"I am short of breath all the time. \"      Subjective:     History of Present Illness:    Armando Chavez is a 64 y.o. patient with a PMH significant for hypertension, coronary artery disease, smoking addiction, and hyperlipidemia. Today, he is here to discuss the findings on his echocardiogram. He says that his shortness of breath is not getting any better. It is limiting his daily activity. He is having a hard time walking at home due to the increased shortness of breath. He is also concerned about a bruise on his left arm. Patient denies exertional chest pain/pressure, dyspnea at rest,  PND, orthopnea, palpitations, lightheadedness, weight changes, changes in LE edema, and syncope. Past Medical History:   has a past medical history of CAD (coronary artery disease), Hyperlipidemia, and Hypertension. Surgical History:   has no past surgical history on file. Social History:   reports that he has been smoking cigarettes. He started smoking about 46 years ago. He has a 23.00 pack-year smoking history. He has never used smokeless tobacco. He reports current alcohol use. He reports current drug use. Drug: Marijuana. Family History:  family history includes Diabetes in his mother; Liver Disease in his brother and father. Home Medications:  Were reviewed and are listed in nursing record and/or below  Prior to Admission medications    Medication Sig Start Date End Date Taking? Authorizing Provider   Magnesium 400 MG TABS Take 1 tablet by mouth daily 9/28/21   Mekhi Irby APRN - CNP   budesonide-formoterol (SYMBICORT) 160-4.5 MCG/ACT AERO Inhale 2 puffs into the lungs 2 times daily For long-term control of COPD symptoms.  9/27/21   Mekhi Irby, APRN - CNP   folic acid (FOLVITE) 1 MG tablet Take 1 tablet by mouth daily 6/22/21   Mekhi Irby, APRN - CNP   atorvastatin (LIPITOR) 20 MG tablet Take 1 tablet by mouth nightly 6/18/21   JAMMIE Choudhury CNP   nicotine (NICODERM CQ) 14 MG/24HR Place 1 patch onto the skin every 24 hours 6/18/21 6/18/22  JAMMIE Choudhury CNP   aspirin 81 MG chewable tablet Take 1 tablet by mouth daily 6/6/20   Nicole Roach MD        CURRENT Medications:  No current facility-administered medications for this visit. Allergies:  Patient has no known allergies. Review of Systems: SEE HPI   · Constitutional: no unanticipated weight loss. There's been no change in energy level, sleep pattern, or activity level. No fevers, chills. · Eyes: No visual changes or diplopia. No scleral icterus. · ENT: No Headaches, hearing loss or vertigo. No mouth sores or sore throat. · Cardiovascular: No Chest pain, tightness or discomfort.  No Shortness of breath. No Dyspnea on exertion, Orthopnea, Paroxysmal nocturnal dyspnea or breathlessness at rest.   No Palpitations.  No Syncope ('blackouts', 'faints', 'collapse') or dizziness. · Respiratory: No cough or wheezing, no sputum production. No hematemesis. · Gastrointestinal: No abdominal pain, appetite loss, blood in stools. No change in bowel or bladder habits. · Genitourinary: No dysuria, trouble voiding, or hematuria. · Musculoskeletal:  No gait disturbance, no joint complaints. · Integumentary: No rash or pruritis. · Neurological: No headache, diplopia, change in muscle strength, numbness or tingling. · Psychiatric: No anxiety or depression. · Endocrine: No temperature intolerance. No excessive thirst, fluid intake, or urination. No tremor. · Hematologic/Lymphatic: No abnormal bruising or bleeding, blood clots or swollen lymph nodes. · Allergic/Immunologic: No nasal congestion or hives. Objective:     PHYSICAL EXAM:      There were no vitals filed for this visit.          General Appearance:  Alert, cooperative, no distress, appears stated age. Head:  Normocephalic, without obvious abnormality, atraumatic. Eyes:  Pupils equal and round. No scleral icterus. Mouth: Moist mucosa, no pharyngeal erythema. Nose: Nares normal. No drainage or sinus tenderness. Neck: Supple, symmetrical, trachea midline. No adenopathy. No tenderness/mass/nodules. No carotid bruit or elevated JVD. Lungs:   Respiratory Effort: Normal   Auscultation: Clear to auscultation bilaterally, respirations unlabored. No wheeze, rales   Chest Wall:  No tenderness or deformity. Cardiovascular:    Pulses  Palpation: normal   Ascultation: Regular rate, S1/ S2 normal. No murmur, rub, or gallop. 2+ radial and pedal pulses, symmetric  Carotid  Femoral   Abdomen and Gastrointestinal:   Soft, non-tender, bowel sounds active. Liver and Spleen  Masses   Musculoskeletal: No muscle wasting  Back  Gait   Extremities: Extremities normal, atraumatic. No cyanosis or edema. No cyanosis clubbing       Skin: Inspection and palpation performed, no rashes or lesions. Pysch: Normal mood and affect.  Alert and oriented to time place person   Neurologic: Normal gross motor and sensory exam.       Labs     All labs have been reviewed    Lab Results   Component Value Date    WBC 2.8 10/19/2021    RBC 4.34 10/19/2021    HGB 14.8 10/19/2021    HCT 44.3 10/19/2021    .1 10/19/2021    RDW 14.4 10/19/2021     10/19/2021     Lab Results   Component Value Date     10/19/2021    K 4.6 10/19/2021    CL 98 10/19/2021    CO2 15 10/19/2021    BUN 6 10/19/2021    CREATININE 0.6 10/19/2021    GFRAA >60 10/19/2021    AGRATIO 1.8 09/26/2021    LABGLOM >60 10/19/2021    GLUCOSE 89 10/19/2021    PROT 7.8 10/19/2021    CALCIUM 9.5 10/19/2021    BILITOT 0.9 10/19/2021    ALKPHOS 126 10/19/2021     10/19/2021     10/19/2021     No results found for: exsulin [facility]  Lab Results   Component Value Date    LABA1C 5.4 09/27/2021     Lab Results   Component Value Date    CKTOTAL 275 06/05/2020 Judy Bernabe <0.01 10/19/2021       Cardiac, Vascular and Imaging Data all Personally Reviewed in Detail by Myself      EKG:     Echocardiogram:   ECHO 9/2/2021  Normal LV size,wall thickness and motion. EF   60%. Normal diastolic  function. Left atrium is of normal size. Normal right ventricular size and function. Trivial mitral and Mild tricuspid regurgitation. A bubble study was performed and shows evidence of right to left shunting  consistent with a patent foramen ovale or atrial septal defect. Stress Test:   Stress test 6/5/2020   Summary   Pharmacological Stress/MPI Results:      1. Technically a satisfactory study. 2. Normal pharmacological stress portion of the study. 3. No evidence of Ischemia by Myocardial Perfusion Imaging. 4. Gated Study shows normal LV size and Systolic function; EF is 67 %. Cath:  Cardiac cath 8/7/2020  IMPRESSION:  1. Moderate to heavy calcification of the left main trunk which is  widely patent. 2.  Mild to moderate calcification of the proximal and the mid LAD with  patent LAD and 30% diagonal stenosis. 3.  Patent circumflex artery and its branches. 4.  A 30% stenosis of the distal right coronary artery with evidence of  mild calcification. 5.  Preserved left ventricular systolic function with normal left heart  hemodynamics.     In view of the above findings, we will consider the patient's pain to be  noncardiac in origin and look for other etiologies for the patient's  chest pain but continue with risk factor modification. Other imaging:     Assessment and Plan     PFO/ASD shunt seen intrathoracic echo. Cardiac MRI    Coronary artery disease  Increased dyspnea. Continue Asa and statin therapy. Due to increased dyspnea will order a nuclear stress test.     Hypertension  Controlled. Continue current treatment. Hyperlipidemia  Continue statin therapy. Follow up after testing.      Thank you for allowing us to participate in the care of Malina Viveros

## 2021-10-22 ENCOUNTER — OFFICE VISIT (OUTPATIENT)
Dept: CARDIOLOGY CLINIC | Age: 61
End: 2021-10-22
Payer: COMMERCIAL

## 2021-10-22 VITALS
BODY MASS INDEX: 23.23 KG/M2 | WEIGHT: 148 LBS | DIASTOLIC BLOOD PRESSURE: 86 MMHG | HEART RATE: 82 BPM | SYSTOLIC BLOOD PRESSURE: 136 MMHG | OXYGEN SATURATION: 98 % | HEIGHT: 67 IN

## 2021-10-22 DIAGNOSIS — R06.09 DOE (DYSPNEA ON EXERTION): ICD-10-CM

## 2021-10-22 DIAGNOSIS — I10 ESSENTIAL HYPERTENSION: ICD-10-CM

## 2021-10-22 DIAGNOSIS — Q21.12 PFO (PATENT FORAMEN OVALE): Primary | ICD-10-CM

## 2021-10-22 DIAGNOSIS — I25.10 CORONARY ARTERY DISEASE INVOLVING NATIVE CORONARY ARTERY OF NATIVE HEART WITHOUT ANGINA PECTORIS: ICD-10-CM

## 2021-10-22 DIAGNOSIS — Q21.10 ASD (ATRIAL SEPTAL DEFECT): ICD-10-CM

## 2021-10-22 DIAGNOSIS — E78.2 MIXED HYPERLIPIDEMIA: ICD-10-CM

## 2021-10-22 PROCEDURE — G8484 FLU IMMUNIZE NO ADMIN: HCPCS | Performed by: INTERNAL MEDICINE

## 2021-10-22 PROCEDURE — 99214 OFFICE O/P EST MOD 30 MIN: CPT | Performed by: INTERNAL MEDICINE

## 2021-10-22 PROCEDURE — G8420 CALC BMI NORM PARAMETERS: HCPCS | Performed by: INTERNAL MEDICINE

## 2021-10-22 PROCEDURE — 4004F PT TOBACCO SCREEN RCVD TLK: CPT | Performed by: INTERNAL MEDICINE

## 2021-10-22 PROCEDURE — G8427 DOCREV CUR MEDS BY ELIG CLIN: HCPCS | Performed by: INTERNAL MEDICINE

## 2021-10-22 PROCEDURE — 3017F COLORECTAL CA SCREEN DOC REV: CPT | Performed by: INTERNAL MEDICINE

## 2021-10-22 NOTE — PATIENT INSTRUCTIONS
Patient Education        Shortness of Breath: Care Instructions  Your Care Instructions     Shortness of breath has many causes. Sometimes conditions such as anxiety can lead to shortness of breath. Some people get mild shortness of breath when they exercise. Trouble breathing also can be a symptom of a serious problem, such as asthma, lung disease, emphysema, heart problems, and pneumonia. If your shortness of breath continues, you may need tests and treatment. Watch for any changes in your breathing and other symptoms. Follow-up care is a key part of your treatment and safety. Be sure to make and go to all appointments, and call your doctor if you are having problems. It's also a good idea to know your test results and keep a list of the medicines you take. How can you care for yourself at home? · Do not smoke or allow others to smoke around you. If you need help quitting, talk to your doctor about stop-smoking programs and medicines. These can increase your chances of quitting for good. · Get plenty of rest and sleep. · Take your medicines exactly as prescribed. Call your doctor if you think you are having a problem with your medicine. · Find healthy ways to deal with stress. ? Exercise daily. ? Get plenty of sleep. ? Eat regularly and well. When should you call for help? Call 911 anytime you think you may need emergency care. For example, call if:    · You have severe shortness of breath.     · You have symptoms of a heart attack. These may include:  ? Chest pain or pressure, or a strange feeling in the chest.  ? Sweating. ? Shortness of breath. ? Nausea or vomiting. ? Pain, pressure, or a strange feeling in the back, neck, jaw, or upper belly or in one or both shoulders or arms. ? Lightheadedness or sudden weakness. ? A fast or irregular heartbeat. After you call 911, the  may tell you to chew 1 adult-strength or 2 to 4 low-dose aspirin. Wait for an ambulance.  Do not try to drive yourself. Call your doctor now or seek immediate medical care if:    · Your shortness of breath gets worse or you start to wheeze. Wheezing is a high-pitched sound when you breathe.     · You wake up at night out of breath or have to prop your head up on several pillows to breathe.     · You are short of breath after only light activity or while at rest.   Watch closely for changes in your health, and be sure to contact your doctor if:    · You do not get better over the next 1 to 2 days. Where can you learn more? Go to https://[employer]enrich-in. org and sign in to your [employer] account. Enter S780 in the [employer] box to learn more about \"Shortness of Breath: Care Instructions. \"     If you do not have an account, please click on the \"Sign Up Now\" link. Current as of: July 6, 2021               Content Version: 13.0  © 2006-2021 Healthwise, Incorporated. Care instructions adapted under license by [facility] ([facility]). If you have questions about a medical condition or this instruction, always ask your healthcare professional. [place] [address] any warranty or liability for your use of this information.

## 2021-10-28 ENCOUNTER — OFFICE VISIT (OUTPATIENT)
Dept: FAMILY MEDICINE CLINIC | Age: 61
End: 2021-10-28
Payer: COMMERCIAL

## 2021-10-28 VITALS
DIASTOLIC BLOOD PRESSURE: 90 MMHG | HEIGHT: 67 IN | WEIGHT: 151 LBS | HEART RATE: 75 BPM | OXYGEN SATURATION: 99 % | SYSTOLIC BLOOD PRESSURE: 164 MMHG | BODY MASS INDEX: 23.7 KG/M2

## 2021-10-28 DIAGNOSIS — M25.512 ACUTE PAIN OF LEFT SHOULDER: Primary | ICD-10-CM

## 2021-10-28 DIAGNOSIS — E83.42 HYPOMAGNESEMIA: ICD-10-CM

## 2021-10-28 DIAGNOSIS — R74.8 ELEVATED LIVER ENZYMES: ICD-10-CM

## 2021-10-28 DIAGNOSIS — I10 ESSENTIAL HYPERTENSION: ICD-10-CM

## 2021-10-28 DIAGNOSIS — I25.10 CORONARY ARTERY DISEASE INVOLVING NATIVE CORONARY ARTERY OF NATIVE HEART WITHOUT ANGINA PECTORIS: ICD-10-CM

## 2021-10-28 DIAGNOSIS — E53.8 FOLATE DEFICIENCY: ICD-10-CM

## 2021-10-28 DIAGNOSIS — F10.10 ALCOHOL ABUSE: ICD-10-CM

## 2021-10-28 LAB
A/G RATIO: 2 (ref 1.1–2.2)
ALBUMIN SERPL-MCNC: 4.7 G/DL (ref 3.4–5)
ALP BLD-CCNC: 143 U/L (ref 40–129)
ALT SERPL-CCNC: 106 U/L (ref 10–40)
ANION GAP SERPL CALCULATED.3IONS-SCNC: 17 MMOL/L (ref 3–16)
AST SERPL-CCNC: 89 U/L (ref 15–37)
BASOPHILS ABSOLUTE: 0.1 K/UL (ref 0–0.2)
BASOPHILS RELATIVE PERCENT: 1.5 %
BILIRUB SERPL-MCNC: 0.5 MG/DL (ref 0–1)
BUN BLDV-MCNC: 6 MG/DL (ref 7–20)
CALCIUM SERPL-MCNC: 9.6 MG/DL (ref 8.3–10.6)
CHLORIDE BLD-SCNC: 101 MMOL/L (ref 99–110)
CO2: 21 MMOL/L (ref 21–32)
CREAT SERPL-MCNC: 0.7 MG/DL (ref 0.8–1.3)
EOSINOPHILS ABSOLUTE: 0.2 K/UL (ref 0–0.6)
EOSINOPHILS RELATIVE PERCENT: 6.3 %
GFR AFRICAN AMERICAN: >60
GFR NON-AFRICAN AMERICAN: >60
GLUCOSE BLD-MCNC: 99 MG/DL (ref 70–99)
HCT VFR BLD CALC: 37.3 % (ref 40.5–52.5)
HEMOGLOBIN: 12.8 G/DL (ref 13.5–17.5)
LYMPHOCYTES ABSOLUTE: 1.4 K/UL (ref 1–5.1)
LYMPHOCYTES RELATIVE PERCENT: 36 %
MAGNESIUM: 1.9 MG/DL (ref 1.8–2.4)
MCH RBC QN AUTO: 35.5 PG (ref 26–34)
MCHC RBC AUTO-ENTMCNC: 34.3 G/DL (ref 31–36)
MCV RBC AUTO: 103.6 FL (ref 80–100)
MONOCYTES ABSOLUTE: 0.8 K/UL (ref 0–1.3)
MONOCYTES RELATIVE PERCENT: 20.9 %
NEUTROPHILS ABSOLUTE: 1.4 K/UL (ref 1.7–7.7)
NEUTROPHILS RELATIVE PERCENT: 35.3 %
PDW BLD-RTO: 14.4 % (ref 12.4–15.4)
PLATELET # BLD: 249 K/UL (ref 135–450)
PMV BLD AUTO: 8.7 FL (ref 5–10.5)
POTASSIUM SERPL-SCNC: 4.6 MMOL/L (ref 3.5–5.1)
RBC # BLD: 3.6 M/UL (ref 4.2–5.9)
SODIUM BLD-SCNC: 139 MMOL/L (ref 136–145)
TOTAL PROTEIN: 7.1 G/DL (ref 6.4–8.2)
WBC # BLD: 3.9 K/UL (ref 4–11)

## 2021-10-28 PROCEDURE — 4004F PT TOBACCO SCREEN RCVD TLK: CPT | Performed by: NURSE PRACTITIONER

## 2021-10-28 PROCEDURE — G8427 DOCREV CUR MEDS BY ELIG CLIN: HCPCS | Performed by: NURSE PRACTITIONER

## 2021-10-28 PROCEDURE — 3017F COLORECTAL CA SCREEN DOC REV: CPT | Performed by: NURSE PRACTITIONER

## 2021-10-28 PROCEDURE — G8484 FLU IMMUNIZE NO ADMIN: HCPCS | Performed by: NURSE PRACTITIONER

## 2021-10-28 PROCEDURE — G8420 CALC BMI NORM PARAMETERS: HCPCS | Performed by: NURSE PRACTITIONER

## 2021-10-28 PROCEDURE — 99214 OFFICE O/P EST MOD 30 MIN: CPT | Performed by: NURSE PRACTITIONER

## 2021-10-28 RX ORDER — AMLODIPINE BESYLATE 5 MG/1
5 TABLET ORAL DAILY
Qty: 30 TABLET | Refills: 1 | Status: SHIPPED | OUTPATIENT
Start: 2021-10-28 | End: 2021-12-28

## 2021-10-28 RX ORDER — ATORVASTATIN CALCIUM 20 MG/1
20 TABLET, FILM COATED ORAL NIGHTLY
Qty: 30 TABLET | Refills: 3 | Status: ON HOLD | OUTPATIENT
Start: 2021-10-28 | End: 2022-08-04 | Stop reason: SDUPTHER

## 2021-10-28 RX ORDER — LIDOCAINE 50 MG/G
1 PATCH TOPICAL DAILY
Qty: 30 PATCH | Refills: 0 | Status: SHIPPED | OUTPATIENT
Start: 2021-10-28 | End: 2021-11-27

## 2021-10-28 RX ORDER — FOLIC ACID 1 MG/1
1 TABLET ORAL DAILY
Qty: 90 TABLET | Refills: 1 | Status: SHIPPED | OUTPATIENT
Start: 2021-10-28 | End: 2022-08-12

## 2021-10-28 RX ORDER — CALCIUM CARBONATE 300MG(750)
1 TABLET,CHEWABLE ORAL DAILY
Qty: 30 TABLET | Refills: 0 | Status: SHIPPED | OUTPATIENT
Start: 2021-10-28 | End: 2022-08-12

## 2021-10-28 ASSESSMENT — ENCOUNTER SYMPTOMS
CONSTIPATION: 0
SHORTNESS OF BREATH: 1
BACK PAIN: 1
CHEST TIGHTNESS: 0
NAUSEA: 0
VOMITING: 0
DIARRHEA: 0
COUGH: 0

## 2021-10-28 NOTE — PROGRESS NOTES
10/28/2021    This is a 64 y.o. male   Chief Complaint   Patient presents with    ED Follow-up     PAtient went to ED for chest pain. . Patient complaining of left side chest pain, upper back pain. HE also states he has a knot near left elbow and his arm throbs and he can barely use it   . HPI   Patient reports that he is having left upper back pain. Went to the ER on 10/19/21. Had chest xray that did not show acute abnormality. He reports that his back pain continues. Not able to press up against it. Reports that two weeks ago started with swelling in left elbow. Having decreased ROM in shoulder and elbow. CAD- seen cardiologist Dr. Christy Campa-- completed echocardiogram and showed normal LV function but bubble study was abnormal.   History of abnormal severe coronary artery calcification per CT chest followed by lexiscan and echocardiogram which were within acceptable limits. Had left and right heart cath (8/2020) reveling non obstructive CAD and preserved left ventricular systolic function. Has been taking his ASA and atorvastatin therapy. Seen Dr. Judy High on 10/22/21. Echo cardiogram on 9/2/21 a bubble study was performed and showed evidence of right to left shunting. Ordered cardiac MRI and nuclear stress test.     Hypertension:  Home blood pressure monitoring: No.  He is not adherent to a low sodium diet. Patient denies chest pain, headache, lightheadedness, peripheral edema and palpitations. Has been on amlodipine in the past, but currently off due BP was improved. Now having elevated BP again. Hyperlipidemia:  Tolerating atorvastatin well.      Lab Results   Component Value Date    LABA1C 5.4 09/27/2021     Lab Results   Component Value Date    CREATININE 0.6 (L) 10/19/2021     Lab Results   Component Value Date     (H) 10/19/2021     (H) 10/19/2021     Lab Results   Component Value Date    HDL 89 (H) 06/18/2021    LDLCALC 74 06/18/2021        Elevated liver enzymes- Ordered abd u/s that showed fatty liver. He reports that he quit drinking 2 weeks ago. Prior he was a very heavy drinker daily. Has not been taking tylenol. Had appt with GI Dr. Adan Hernandez on 8/31/21 but did not go and now he has rescheduled for 11/3/21. Folate deficiency- on folic acid 1 mg daily     PFT 8/2021 showed normal study with bronchodilator response--may suggest underlying asthma. CT chest 9/2021 showed emphysema. Reports that he continues with shortness of breath with any walking. Denies wheezing. Reports that he was smoking 1 ppd. Now he is down to 3/4 ppd. He did not obtain the nicoderm patches due to cost.   Chest xray normal 10/2021    Started on symbicort and doing 2 puffs BID. Feels that it is helping slightly. Has not needed the albuterol inhaler. Will have yellow sputum production. Ordered thyroid u/s last visit for thyroid goiter which showed thyroid nodule. He reports that he has never had a biopsy. TSH normal 6/18/21   Appt with endo is not till Jan 2022. I have ordered thyroid biopsy to schedule with IR, but he has not scheduled. Patient Active Problem List   Diagnosis    Thyroid goiter    Chest pain    SOB (shortness of breath)    Essential hypertension    Elevated liver enzymes    Alcohol abuse    Folate deficiency    Coronary artery disease involving native coronary artery of native heart without angina pectoris    Other emphysema ([address]- seen on CT scan           Current Outpatient Medications   Medication Sig Dispense Refill    budesonide-formoterol (SYMBICORT) 160-4.5 MCG/ACT AERO Inhale 2 puffs into the lungs 2 times daily For long-term control of COPD symptoms.  1 each 2    atorvastatin (LIPITOR) 20 MG tablet Take 1 tablet by mouth nightly 30 tablet 3    aspirin 81 MG chewable tablet Take 1 tablet by mouth daily 30 tablet 3    Magnesium 400 MG TABS Take 1 tablet by mouth daily (Patient not taking: Reported on 10/28/2021) 30 tablet 0    folic acid (FOLVITE) 1 MG tablet Take 1 tablet by mouth daily (Patient not taking: Reported on 10/28/2021) 90 tablet 1    nicotine (NICODERM CQ) 14 MG/24HR Place 1 patch onto the skin every 24 hours (Patient not taking: Reported on 10/28/2021) 30 patch 1     No current facility-administered medications for this visit. No Known Allergies    Review of Systems   Constitutional: Negative for activity change. Respiratory: Positive for shortness of breath. Negative for cough and chest tightness. Cardiovascular: Negative for chest pain and palpitations. Gastrointestinal: Negative for constipation, diarrhea, nausea and vomiting. Musculoskeletal: Positive for arthralgias, back pain and myalgias. Neurological: Negative for dizziness, syncope, weakness and headaches. Psychiatric/Behavioral: Negative for confusion. Vitals:    10/28/21 1137 10/28/21 1212   BP: (!) 168/90 (!) 164/90   Site: Right Upper Arm Right Upper Arm   Position: Sitting Sitting   Cuff Size: Medium Adult Medium Adult   Pulse: 75    SpO2: 99%    Weight: 151 lb (68.5 kg)    Height: 5' 7\" (1.702 m)        Body mass index is 23.65 kg/m². Wt Readings from Last 3 Encounters:   10/28/21 151 lb (68.5 kg)   10/22/21 148 lb (67.1 kg)   10/19/21 140 lb 14 oz (63.9 kg)       BP Readings from Last 3 Encounters:   10/28/21 (!) 168/90   10/22/21 136/86   10/19/21 (!) 178/86       Physical Exam  Vitals and nursing note reviewed. Constitutional:       General: He is not in acute distress. Appearance: He is well-developed. HENT:      Head: Normocephalic and atraumatic. Eyes:      Extraocular Movements: Extraocular movements intact. Conjunctiva/sclera: Conjunctivae normal.   Neck:      Thyroid: Thyromegaly present. Cardiovascular:      Rate and Rhythm: Normal rate and regular rhythm. Heart sounds: Normal heart sounds. No murmur heard. No friction rub. No gallop. Pulmonary:      Effort: Pulmonary effort is normal. No respiratory distress. Breath sounds: Normal breath sounds. Musculoskeletal:      Cervical back: Neck supple. Right lower leg: No edema. Left lower leg: No edema. Comments: Left thoracic spine tender to palpation. Has full left shoulder ROM, but he reports pain in all directions. Has soft tissue nodule lateral left elbow that is non tender to palpation. He is not able to fully extend his elbow. Skin:     General: Skin is warm and dry. Neurological:      Mental Status: He is alert and oriented to person, place, and time. Psychiatric:         Behavior: Behavior normal.         Thought Content: Thought content normal.         Judgment: Judgment normal.         Assessmentand Plan  Jerod Garcia was seen today for follow-up. Diagnoses and all orders for this visit:    Coronary artery disease involving native coronary artery of native heart without angina pectoris  Left and right heart cath from 8/2020 showed non obstructive CAD. Advised to take ASA 81 mg daily and atorvastatin 20 mg daily  Advised smoking cessation. Continue f/u with cardiologist Dr. Justice Moss    PFO/ASD- has further cardiac testing ordered from Dr. Che Valentino. Shortness of breath/ emphysema   PFT was suggestive of possible asthma. CT lung screening showed emphysema. Continue symbicort 2 puffs BID. Rinse mouth out after use. Albuterol inhaler PRN    Elevated liver enzymes: liver function while in ER was increased again. Repeat labs. Liver u/s showed fatty liver. He reports that he quit drinking alcohol 2 weeks ago. Advised to continue with alcohol cessation. Advised to avoid tylenol  Needs to f/u with GI. Has appt scheduled with Dr. Tadeo Owen on 11/3/21 that I advised him to keep. Thyroid nodule: not able to see endo till Jan 2022. Advised to have FNA with IR in hospital prior to eval with endo. Asked again for him to schedule.      Folate deficiency  Continue folic acid 1 mg daily     Tobacco abuse  Smoking cessation discussed and needed. Reports that nicoderm was too expensive. He is working on decreasing     Hypomagnesemia  -     MAGNESIUM; Future  On magnesium 400 mg daily     Acute pain of left shoulder  -     April Dumont MD, Orthopedic Surgery, [facility]  -     lidocaine (LIDODERM) 5 %; Place 1 patch onto the skin daily 12 hours on, 12 hours off. Refer to ortho for further evaluation. He will also have he left elbow evaluated. Also having left thoracic back pain. Advised to use the lidocaine patches on area daily. Essential hypertension: uncontrolled. Restart medication. -     amLODIPine (NORVASC) 5 MG tablet; Take 1 tablet by mouth daily      Advised to obtain pneumovax and flu vaccine at his pharmacy    Return in about 11 days (around 11/8/2021), or if symptoms worsen or fail to improve, for chronic conditions.

## 2021-11-01 ENCOUNTER — TELEPHONE (OUTPATIENT)
Dept: ORTHOPEDIC SURGERY | Age: 61
End: 2021-11-01

## 2021-11-01 ENCOUNTER — OFFICE VISIT (OUTPATIENT)
Dept: ORTHOPEDIC SURGERY | Age: 61
End: 2021-11-01
Payer: COMMERCIAL

## 2021-11-01 VITALS — BODY MASS INDEX: 22.76 KG/M2 | WEIGHT: 145 LBS | HEIGHT: 67 IN

## 2021-11-01 DIAGNOSIS — D21.10: ICD-10-CM

## 2021-11-01 DIAGNOSIS — M12.812 ROTATOR CUFF ARTHROPATHY, LEFT: Primary | ICD-10-CM

## 2021-11-01 DIAGNOSIS — M54.12 CERVICAL RADICULOPATHY: ICD-10-CM

## 2021-11-01 PROCEDURE — G8420 CALC BMI NORM PARAMETERS: HCPCS | Performed by: ORTHOPAEDIC SURGERY

## 2021-11-01 PROCEDURE — 3017F COLORECTAL CA SCREEN DOC REV: CPT | Performed by: ORTHOPAEDIC SURGERY

## 2021-11-01 PROCEDURE — 99203 OFFICE O/P NEW LOW 30 MIN: CPT | Performed by: ORTHOPAEDIC SURGERY

## 2021-11-01 PROCEDURE — 4004F PT TOBACCO SCREEN RCVD TLK: CPT | Performed by: ORTHOPAEDIC SURGERY

## 2021-11-01 PROCEDURE — G8427 DOCREV CUR MEDS BY ELIG CLIN: HCPCS | Performed by: ORTHOPAEDIC SURGERY

## 2021-11-01 PROCEDURE — G8484 FLU IMMUNIZE NO ADMIN: HCPCS | Performed by: ORTHOPAEDIC SURGERY

## 2021-11-01 RX ORDER — TIZANIDINE 4 MG/1
4 TABLET ORAL 4 TIMES DAILY PRN
Qty: 40 TABLET | Refills: 0 | Status: SHIPPED | OUTPATIENT
Start: 2021-11-01 | End: 2021-12-13

## 2021-11-01 RX ORDER — METHYLPREDNISOLONE 4 MG/1
TABLET ORAL
Qty: 1 KIT | Refills: 0 | Status: ON HOLD | OUTPATIENT
Start: 2021-11-01 | End: 2022-08-02

## 2021-11-01 RX ORDER — NAPROXEN 500 MG/1
500 TABLET ORAL 2 TIMES DAILY WITH MEALS
Qty: 60 TABLET | Refills: 2 | Status: ON HOLD | OUTPATIENT
Start: 2021-11-01 | End: 2022-08-02

## 2021-11-01 NOTE — TELEPHONE ENCOUNTER
Called and LM for patient. He needs to call his insurance company and see where we can send the referral to.

## 2021-11-01 NOTE — TELEPHONE ENCOUNTER
СВЕТЛАНА CALLED TO SAY THEY [facility]) DO NOT EXCEPT THIS PATIENTS INSURANCE WHICH IS [employer]UC[employer].

## 2021-11-01 NOTE — PROGRESS NOTES
[facility]    Chief Complaint: Left shoulder and arm pain, left elbow mass    HPI:  Shola Martinez is a 64 y.o. who is here for initial evaluation of multiple left upper extremity issues. Reports a worsening 1 month history of left shoulder pain that radiates down his arm. There is associated numbness and tingling. He denies any prior neck issues or left shoulder problems. He reports weak  in the left hand. The pain is rated as 9/10. Most the pain is around his scapula. He has no issues with neck range of motion. He also reports a 3-week history of a knot on his left elbow. This has not been an issue in the past although he has had similar knots in different places of his body in the past.  He is not currently on any medications to help with these issues. Patient Active Problem List   Diagnosis    Thyroid goiter    Chest pain    SOB (shortness of breath)    Essential hypertension    Elevated liver enzymes    Alcohol abuse    Folate deficiency    Coronary artery disease involving native coronary artery of native heart without angina pectoris    Other emphysema ([facility] [address]- seen on CT scan        ROS:  Constitutional: denies fever, chills, weight loss  MSK: denies pain in other joints, muscle aches  Neurological: denies numbness, tingling, weakness    Exam:  Height 5' 7\" (1.702 m), weight 145 lb (65.8 kg). Appearance: sitting in exam room chair, appears to be in no acute distress, awake and alert  Resp: unlabored breathing on room air  Skin: warm, dry and intact with out erythema or significant increased temperature  LUE: Nontender over cervical spine. Decreased muscle mass of the supraspinatus muscle belly. Tender along the medial border of the scapula. Active shoulder range of motion to forward flexion of 140 degrees, external rotation to 45 degrees, internal rotation to the lumbar spine.   4/5 strength with resisted abduction in the scapular plane and resisted external rotation at the neutral position. Palpable radial pulse. He reports sensation intact light touch in radial, ulnar, median, axillary nerve distributions. He has a well-demarcated rubbery dense 1 x 1 cm nodule just lateral to the radial head. This is nontender. Imaging:  3 views of the left shoulder were performed and interpreted today. He has a high riding humeral head with degenerative changes of the greater tuberosity. 2 views of the left elbow were performed and interpreted today. There are no bony abnormalities of the elbow. Assessment:  Left shoulder rotator cuff tear arthropathy  Left cervical radiculopathy  Left arm fibroma    Plan:  Discussed these diagnoses and treatment options. He seems to have issues with both his shoulder and radicular pain from his neck. For this reason, I prescribed a Medrol Dosepak. I also prescribed naproxen to take once a Medrol Dosepak is completed. Finally, I prescribed a muscle relaxer to help with muscle spasms. I did recommend physical therapy. Because he does not have transportation, he is asking if therapy could come out to the home to see him. We will attempt to have this arranged. The fibroma is very likely benign and is not likely related to the other issues. He will follow-up in 4 to 6 weeks for repeat evaluation or sooner if needed. Total time spent on today's encounter was at least 32 minutes. This time included reviewing prior notes, radiographs, and lab results when available, reviewing history obtained by medical assistant, performing history and physical exam, reviewing tests/radiographs with the patient, counseling the patient, ordering medications or tests, documentation in the electronic health record, and coordination of care. This dictation was done with [employer] dictation and may contain mechanical errors related to translation.

## 2021-11-19 ENCOUNTER — HOSPITAL ENCOUNTER (OUTPATIENT)
Dept: NON INVASIVE DIAGNOSTICS | Age: 61
Discharge: HOME | End: 2021-11-19
Payer: COMMERCIAL

## 2021-11-19 DIAGNOSIS — R06.09 DOE (DYSPNEA ON EXERTION): ICD-10-CM

## 2021-11-19 LAB
LV EF: 62 %
LVEF MODALITY: NORMAL

## 2021-11-19 PROCEDURE — 3430000000 HC RX DIAGNOSTIC RADIOPHARMACEUTICAL: Performed by: INTERNAL MEDICINE

## 2021-11-19 PROCEDURE — A9502 TC99M TETROFOSMIN: HCPCS | Performed by: INTERNAL MEDICINE

## 2021-11-19 PROCEDURE — 78452 HT MUSCLE IMAGE SPECT MULT: CPT

## 2021-11-19 RX ADMIN — TETROFOSMIN 10 MILLICURIE: 1.38 INJECTION, POWDER, LYOPHILIZED, FOR SOLUTION INTRAVENOUS at 10:38

## 2021-11-19 RX ADMIN — TETROFOSMIN 30 MILLICURIE: 1.38 INJECTION, POWDER, LYOPHILIZED, FOR SOLUTION INTRAVENOUS at 11:42

## 2021-12-02 ENCOUNTER — HOSPITAL ENCOUNTER (OUTPATIENT)
Dept: MRI IMAGING | Age: 61
Discharge: HOME | End: 2021-12-02
Payer: COMMERCIAL

## 2021-12-02 DIAGNOSIS — Q21.10 ASD (ATRIAL SEPTAL DEFECT): ICD-10-CM

## 2021-12-02 DIAGNOSIS — Q21.12 PFO (PATENT FORAMEN OVALE): ICD-10-CM

## 2021-12-02 LAB
LV EF: 57 %
LVEF MODALITY: NORMAL

## 2021-12-02 PROCEDURE — 75565 CARD MRI VELOC FLOW MAPPING: CPT

## 2021-12-02 PROCEDURE — 75561 CARDIAC MRI FOR MORPH W/DYE: CPT | Performed by: INTERNAL MEDICINE

## 2021-12-02 PROCEDURE — A9585 GADOBUTROL INJECTION: HCPCS | Performed by: INTERNAL MEDICINE

## 2021-12-02 PROCEDURE — 75565 CARD MRI VELOC FLOW MAPPING: CPT | Performed by: INTERNAL MEDICINE

## 2021-12-02 PROCEDURE — 6360000004 HC RX CONTRAST MEDICATION: Performed by: INTERNAL MEDICINE

## 2021-12-02 RX ADMIN — GADOBUTROL 10 ML: 604.72 INJECTION INTRAVENOUS at 11:37

## 2021-12-13 DIAGNOSIS — M12.812 ROTATOR CUFF ARTHROPATHY, LEFT: ICD-10-CM

## 2021-12-13 RX ORDER — TIZANIDINE 4 MG/1
TABLET ORAL
Qty: 40 TABLET | Refills: 0 | Status: ON HOLD | OUTPATIENT
Start: 2021-12-13 | End: 2022-08-02

## 2022-08-02 ENCOUNTER — HOSPITAL ENCOUNTER (INPATIENT)
Age: 62
LOS: 2 days | Discharge: HOME | DRG: 812 | End: 2022-08-04
Attending: EMERGENCY MEDICINE | Admitting: PEDIATRICS
Payer: COMMERCIAL

## 2022-08-02 ENCOUNTER — APPOINTMENT (OUTPATIENT)
Dept: GENERAL RADIOLOGY | Age: 62
DRG: 812 | End: 2022-08-02
Payer: COMMERCIAL

## 2022-08-02 DIAGNOSIS — R09.02 HYPOXIA: ICD-10-CM

## 2022-08-02 DIAGNOSIS — J69.0 ASPIRATION PNEUMONIA OF BOTH LOWER LOBES, UNSPECIFIED ASPIRATION PNEUMONIA TYPE (HCC): Primary | ICD-10-CM

## 2022-08-02 DIAGNOSIS — I25.10 CORONARY ARTERY DISEASE INVOLVING NATIVE CORONARY ARTERY OF NATIVE HEART WITHOUT ANGINA PECTORIS: ICD-10-CM

## 2022-08-02 DIAGNOSIS — E87.20 LACTIC ACIDOSIS: ICD-10-CM

## 2022-08-02 DIAGNOSIS — I10 ESSENTIAL HYPERTENSION: ICD-10-CM

## 2022-08-02 DIAGNOSIS — R41.0 CONFUSION: ICD-10-CM

## 2022-08-02 PROBLEM — J96.91 RESPIRATORY FAILURE WITH HYPOXIA (HCC): Status: ACTIVE | Noted: 2022-08-02

## 2022-08-02 LAB
A/G RATIO: 1.7 (ref 1.1–2.2)
ALBUMIN SERPL-MCNC: 4.6 G/DL (ref 3.4–5)
ALP BLD-CCNC: 105 U/L (ref 40–129)
ALT SERPL-CCNC: 87 U/L (ref 10–40)
AMPHETAMINE SCREEN, URINE: ABNORMAL
ANION GAP SERPL CALCULATED.3IONS-SCNC: 22 MMOL/L (ref 3–16)
ANISOCYTOSIS: ABNORMAL
AST SERPL-CCNC: 119 U/L (ref 15–37)
BACTERIA: ABNORMAL /HPF
BARBITURATE SCREEN URINE: ABNORMAL
BASOPHILS ABSOLUTE: 0 K/UL (ref 0–0.2)
BASOPHILS RELATIVE PERCENT: 0.4 %
BENZODIAZEPINE SCREEN, URINE: ABNORMAL
BILIRUB SERPL-MCNC: 0.9 MG/DL (ref 0–1)
BILIRUBIN URINE: NEGATIVE
BLOOD, URINE: ABNORMAL
BUN BLDV-MCNC: 8 MG/DL (ref 7–20)
CALCIUM OXALATE CRYSTALS: PRESENT
CALCIUM SERPL-MCNC: 9 MG/DL (ref 8.3–10.6)
CANNABINOID SCREEN URINE: POSITIVE
CHLORIDE BLD-SCNC: 102 MMOL/L (ref 99–110)
CLARITY: ABNORMAL
CO2: 18 MMOL/L (ref 21–32)
COCAINE METABOLITE SCREEN URINE: ABNORMAL
COLOR: ABNORMAL
CREAT SERPL-MCNC: 1.4 MG/DL (ref 0.8–1.3)
EKG ATRIAL RATE: 92 BPM
EKG DIAGNOSIS: NORMAL
EKG P AXIS: 75 DEGREES
EKG P-R INTERVAL: 140 MS
EKG Q-T INTERVAL: 372 MS
EKG QRS DURATION: 92 MS
EKG QTC CALCULATION (BAZETT): 460 MS
EKG R AXIS: 15 DEGREES
EKG T AXIS: 61 DEGREES
EKG VENTRICULAR RATE: 92 BPM
EOSINOPHILS ABSOLUTE: 0 K/UL (ref 0–0.6)
EOSINOPHILS RELATIVE PERCENT: 0.1 %
EPITHELIAL CELLS, UA: 13 /HPF (ref 0–5)
FINE CASTS, UA: ABNORMAL /LPF (ref 0–2)
GFR AFRICAN AMERICAN: >60
GFR NON-AFRICAN AMERICAN: 51
GLUCOSE BLD-MCNC: 135 MG/DL (ref 70–99)
GLUCOSE URINE: NEGATIVE MG/DL
HCT VFR BLD CALC: 39.6 % (ref 40.5–52.5)
HEMOGLOBIN: 13.6 G/DL (ref 13.5–17.5)
HYALINE CASTS: ABNORMAL /LPF (ref 0–2)
KETONES, URINE: ABNORMAL MG/DL
LACTIC ACID, SEPSIS: 2.5 MMOL/L (ref 0.4–1.9)
LACTIC ACID, SEPSIS: 3.2 MMOL/L (ref 0.4–1.9)
LEUKOCYTE ESTERASE, URINE: ABNORMAL
LYMPHOCYTES ABSOLUTE: 0.6 K/UL (ref 1–5.1)
LYMPHOCYTES RELATIVE PERCENT: 10.2 %
Lab: ABNORMAL
MCH RBC QN AUTO: 35 PG (ref 26–34)
MCHC RBC AUTO-ENTMCNC: 34.3 G/DL (ref 31–36)
MCV RBC AUTO: 102.2 FL (ref 80–100)
METHADONE SCREEN, URINE: ABNORMAL
MICROSCOPIC EXAMINATION: YES
MONOCYTES ABSOLUTE: 0.6 K/UL (ref 0–1.3)
MONOCYTES RELATIVE PERCENT: 9.6 %
MUCUS: ABNORMAL /LPF
NEUTROPHILS ABSOLUTE: 4.8 K/UL (ref 1.7–7.7)
NEUTROPHILS RELATIVE PERCENT: 79.7 %
NITRITE, URINE: NEGATIVE
OPIATE SCREEN URINE: ABNORMAL
OXYCODONE URINE: ABNORMAL
PDW BLD-RTO: 15 % (ref 12.4–15.4)
PH UA: 5
PH UA: 5 (ref 5–8)
PHENCYCLIDINE SCREEN URINE: ABNORMAL
PLATELET # BLD: 170 K/UL (ref 135–450)
PLATELET SLIDE REVIEW: ADEQUATE
PMV BLD AUTO: 7.2 FL (ref 5–10.5)
POTASSIUM SERPL-SCNC: 3.9 MMOL/L (ref 3.5–5.1)
PRO-BNP: 27 PG/ML (ref 0–124)
PROPOXYPHENE SCREEN: ABNORMAL
PROTEIN UA: 100 MG/DL
RBC # BLD: 3.87 M/UL (ref 4.2–5.9)
RBC UA: 3 /HPF (ref 0–4)
SARS-COV-2, NAAT: NOT DETECTED
SLIDE REVIEW: ABNORMAL
SODIUM BLD-SCNC: 142 MMOL/L (ref 136–145)
SPECIFIC GRAVITY UA: 1.02 (ref 1–1.03)
TOTAL PROTEIN: 7.3 G/DL (ref 6.4–8.2)
TROPONIN: <0.01 NG/ML
URINE REFLEX TO CULTURE: ABNORMAL
URINE TYPE: ABNORMAL
UROBILINOGEN, URINE: 1 E.U./DL
WBC # BLD: 6 K/UL (ref 4–11)
WBC UA: 4 /HPF (ref 0–5)

## 2022-08-02 PROCEDURE — 71045 X-RAY EXAM CHEST 1 VIEW: CPT

## 2022-08-02 PROCEDURE — 36415 COLL VENOUS BLD VENIPUNCTURE: CPT

## 2022-08-02 PROCEDURE — 6360000002 HC RX W HCPCS: Performed by: PEDIATRICS

## 2022-08-02 PROCEDURE — 96365 THER/PROPH/DIAG IV INF INIT: CPT

## 2022-08-02 PROCEDURE — 80053 COMPREHEN METABOLIC PANEL: CPT

## 2022-08-02 PROCEDURE — 93005 ELECTROCARDIOGRAM TRACING: CPT | Performed by: EMERGENCY MEDICINE

## 2022-08-02 PROCEDURE — 85025 COMPLETE CBC W/AUTO DIFF WBC: CPT

## 2022-08-02 PROCEDURE — 6370000000 HC RX 637 (ALT 250 FOR IP): Performed by: PEDIATRICS

## 2022-08-02 PROCEDURE — 2500000003 HC RX 250 WO HCPCS: Performed by: STUDENT IN AN ORGANIZED HEALTH CARE EDUCATION/TRAINING PROGRAM

## 2022-08-02 PROCEDURE — 80307 DRUG TEST PRSMV CHEM ANLYZR: CPT

## 2022-08-02 PROCEDURE — 84484 ASSAY OF TROPONIN QUANT: CPT

## 2022-08-02 PROCEDURE — 6360000002 HC RX W HCPCS: Performed by: EMERGENCY MEDICINE

## 2022-08-02 PROCEDURE — 87635 SARS-COV-2 COVID-19 AMP PRB: CPT

## 2022-08-02 PROCEDURE — 81001 URINALYSIS AUTO W/SCOPE: CPT

## 2022-08-02 PROCEDURE — 94664 DEMO&/EVAL PT USE INHALER: CPT

## 2022-08-02 PROCEDURE — 87040 BLOOD CULTURE FOR BACTERIA: CPT

## 2022-08-02 PROCEDURE — 99285 EMERGENCY DEPT VISIT HI MDM: CPT

## 2022-08-02 PROCEDURE — 94640 AIRWAY INHALATION TREATMENT: CPT

## 2022-08-02 PROCEDURE — 83605 ASSAY OF LACTIC ACID: CPT

## 2022-08-02 PROCEDURE — 2700000000 HC OXYGEN THERAPY PER DAY

## 2022-08-02 PROCEDURE — 2580000003 HC RX 258: Performed by: PEDIATRICS

## 2022-08-02 PROCEDURE — 6370000000 HC RX 637 (ALT 250 FOR IP)

## 2022-08-02 PROCEDURE — 6370000000 HC RX 637 (ALT 250 FOR IP): Performed by: STUDENT IN AN ORGANIZED HEALTH CARE EDUCATION/TRAINING PROGRAM

## 2022-08-02 PROCEDURE — 1200000000 HC SEMI PRIVATE

## 2022-08-02 PROCEDURE — 83880 ASSAY OF NATRIURETIC PEPTIDE: CPT

## 2022-08-02 PROCEDURE — 2580000003 HC RX 258: Performed by: EMERGENCY MEDICINE

## 2022-08-02 PROCEDURE — 6370000000 HC RX 637 (ALT 250 FOR IP): Performed by: NURSE PRACTITIONER

## 2022-08-02 PROCEDURE — 94760 N-INVAS EAR/PLS OXIMETRY 1: CPT

## 2022-08-02 PROCEDURE — 93010 ELECTROCARDIOGRAM REPORT: CPT | Performed by: INTERNAL MEDICINE

## 2022-08-02 RX ORDER — LANOLIN ALCOHOL/MO/W.PET/CERES
CREAM (GRAM) TOPICAL
Status: DISPENSED
Start: 2022-08-02 | End: 2022-08-02

## 2022-08-02 RX ORDER — ACETAMINOPHEN 325 MG/1
650 TABLET ORAL EVERY 6 HOURS PRN
Status: DISCONTINUED | OUTPATIENT
Start: 2022-08-02 | End: 2022-08-04 | Stop reason: HOSPADM

## 2022-08-02 RX ORDER — ONDANSETRON 2 MG/ML
4 INJECTION INTRAMUSCULAR; INTRAVENOUS EVERY 6 HOURS PRN
Status: DISCONTINUED | OUTPATIENT
Start: 2022-08-02 | End: 2022-08-04 | Stop reason: HOSPADM

## 2022-08-02 RX ORDER — LORAZEPAM 2 MG/ML
3 INJECTION INTRAMUSCULAR
Status: DISCONTINUED | OUTPATIENT
Start: 2022-08-02 | End: 2022-08-04 | Stop reason: HOSPADM

## 2022-08-02 RX ORDER — IPRATROPIUM BROMIDE AND ALBUTEROL SULFATE 2.5; .5 MG/3ML; MG/3ML
1 SOLUTION RESPIRATORY (INHALATION) 4 TIMES DAILY
Status: DISCONTINUED | OUTPATIENT
Start: 2022-08-02 | End: 2022-08-04 | Stop reason: HOSPADM

## 2022-08-02 RX ORDER — ENOXAPARIN SODIUM 100 MG/ML
40 INJECTION SUBCUTANEOUS DAILY
Status: DISCONTINUED | OUTPATIENT
Start: 2022-08-02 | End: 2022-08-04 | Stop reason: HOSPADM

## 2022-08-02 RX ORDER — LIDOCAINE 4 G/G
1 PATCH TOPICAL DAILY
Status: DISCONTINUED | OUTPATIENT
Start: 2022-08-02 | End: 2022-08-04 | Stop reason: HOSPADM

## 2022-08-02 RX ORDER — SODIUM CHLORIDE 0.9 % (FLUSH) 0.9 %
5-40 SYRINGE (ML) INJECTION EVERY 12 HOURS SCHEDULED
Status: DISCONTINUED | OUTPATIENT
Start: 2022-08-02 | End: 2022-08-04 | Stop reason: HOSPADM

## 2022-08-02 RX ORDER — ASPIRIN 81 MG/1
TABLET, CHEWABLE ORAL
Status: DISPENSED
Start: 2022-08-02 | End: 2022-08-02

## 2022-08-02 RX ORDER — FOLIC ACID 1 MG/1
1 TABLET ORAL DAILY
Status: DISCONTINUED | OUTPATIENT
Start: 2022-08-02 | End: 2022-08-04 | Stop reason: HOSPADM

## 2022-08-02 RX ORDER — CALCIUM CARBONATE 300MG(750)
1 TABLET,CHEWABLE ORAL DAILY
Status: DISCONTINUED | OUTPATIENT
Start: 2022-08-02 | End: 2022-08-02

## 2022-08-02 RX ORDER — LANOLIN ALCOHOL/MO/W.PET/CERES
3 CREAM (GRAM) TOPICAL NIGHTLY PRN
Status: DISCONTINUED | OUTPATIENT
Start: 2022-08-02 | End: 2022-08-04 | Stop reason: HOSPADM

## 2022-08-02 RX ORDER — CEFTRIAXONE 2 G/1
INJECTION, POWDER, FOR SOLUTION INTRAMUSCULAR; INTRAVENOUS
Status: DISPENSED
Start: 2022-08-02 | End: 2022-08-02

## 2022-08-02 RX ORDER — LANOLIN ALCOHOL/MO/W.PET/CERES
400 CREAM (GRAM) TOPICAL DAILY
Status: DISCONTINUED | OUTPATIENT
Start: 2022-08-02 | End: 2022-08-04 | Stop reason: HOSPADM

## 2022-08-02 RX ORDER — AMLODIPINE BESYLATE 5 MG/1
TABLET ORAL
Status: COMPLETED
Start: 2022-08-02 | End: 2022-08-02

## 2022-08-02 RX ORDER — METRONIDAZOLE 500 MG/100ML
500 INJECTION, SOLUTION INTRAVENOUS EVERY 8 HOURS
Status: DISCONTINUED | OUTPATIENT
Start: 2022-08-02 | End: 2022-08-04 | Stop reason: HOSPADM

## 2022-08-02 RX ORDER — MULTIVITAMIN WITH IRON
1 TABLET ORAL DAILY
Status: DISCONTINUED | OUTPATIENT
Start: 2022-08-02 | End: 2022-08-04 | Stop reason: HOSPADM

## 2022-08-02 RX ORDER — SODIUM CHLORIDE 9 MG/ML
INJECTION, SOLUTION INTRAVENOUS PRN
Status: DISCONTINUED | OUTPATIENT
Start: 2022-08-02 | End: 2022-08-04 | Stop reason: HOSPADM

## 2022-08-02 RX ORDER — LORAZEPAM 2 MG/ML
1 INJECTION INTRAMUSCULAR
Status: DISCONTINUED | OUTPATIENT
Start: 2022-08-02 | End: 2022-08-04 | Stop reason: HOSPADM

## 2022-08-02 RX ORDER — SODIUM CHLORIDE 0.9 % (FLUSH) 0.9 %
5-40 SYRINGE (ML) INJECTION PRN
Status: DISCONTINUED | OUTPATIENT
Start: 2022-08-02 | End: 2022-08-04 | Stop reason: HOSPADM

## 2022-08-02 RX ORDER — ASPIRIN 81 MG/1
81 TABLET, CHEWABLE ORAL DAILY
Status: DISCONTINUED | OUTPATIENT
Start: 2022-08-02 | End: 2022-08-04 | Stop reason: HOSPADM

## 2022-08-02 RX ORDER — ACETAMINOPHEN 650 MG/1
650 SUPPOSITORY RECTAL EVERY 6 HOURS PRN
Status: DISCONTINUED | OUTPATIENT
Start: 2022-08-02 | End: 2022-08-04 | Stop reason: HOSPADM

## 2022-08-02 RX ORDER — ONDANSETRON 4 MG/1
4 TABLET, ORALLY DISINTEGRATING ORAL EVERY 8 HOURS PRN
Status: DISCONTINUED | OUTPATIENT
Start: 2022-08-02 | End: 2022-08-04 | Stop reason: HOSPADM

## 2022-08-02 RX ORDER — LORAZEPAM 1 MG/1
4 TABLET ORAL
Status: DISCONTINUED | OUTPATIENT
Start: 2022-08-02 | End: 2022-08-04 | Stop reason: HOSPADM

## 2022-08-02 RX ORDER — CYCLOBENZAPRINE HCL 10 MG
10 TABLET ORAL 2 TIMES DAILY
Status: COMPLETED | OUTPATIENT
Start: 2022-08-02 | End: 2022-08-03

## 2022-08-02 RX ORDER — LORAZEPAM 1 MG/1
2 TABLET ORAL
Status: DISCONTINUED | OUTPATIENT
Start: 2022-08-02 | End: 2022-08-04 | Stop reason: HOSPADM

## 2022-08-02 RX ORDER — POLYETHYLENE GLYCOL 3350 17 G/17G
17 POWDER, FOR SOLUTION ORAL DAILY PRN
Status: DISCONTINUED | OUTPATIENT
Start: 2022-08-02 | End: 2022-08-04 | Stop reason: HOSPADM

## 2022-08-02 RX ORDER — ATORVASTATIN CALCIUM 20 MG/1
20 TABLET, FILM COATED ORAL NIGHTLY
Status: DISCONTINUED | OUTPATIENT
Start: 2022-08-02 | End: 2022-08-04 | Stop reason: HOSPADM

## 2022-08-02 RX ORDER — LORAZEPAM 2 MG/ML
2 INJECTION INTRAMUSCULAR
Status: DISCONTINUED | OUTPATIENT
Start: 2022-08-02 | End: 2022-08-04 | Stop reason: HOSPADM

## 2022-08-02 RX ORDER — NICOTINE 21 MG/24HR
1 PATCH, TRANSDERMAL 24 HOURS TRANSDERMAL DAILY
Status: DISCONTINUED | OUTPATIENT
Start: 2022-08-02 | End: 2022-08-04 | Stop reason: HOSPADM

## 2022-08-02 RX ORDER — AMLODIPINE BESYLATE 5 MG/1
5 TABLET ORAL DAILY
Status: DISCONTINUED | OUTPATIENT
Start: 2022-08-02 | End: 2022-08-04 | Stop reason: HOSPADM

## 2022-08-02 RX ORDER — LORAZEPAM 2 MG/ML
4 INJECTION INTRAMUSCULAR
Status: DISCONTINUED | OUTPATIENT
Start: 2022-08-02 | End: 2022-08-04 | Stop reason: HOSPADM

## 2022-08-02 RX ORDER — GAUZE BANDAGE 2" X 2"
100 BANDAGE TOPICAL DAILY
Status: DISCONTINUED | OUTPATIENT
Start: 2022-08-02 | End: 2022-08-04 | Stop reason: HOSPADM

## 2022-08-02 RX ORDER — SODIUM CHLORIDE 9 MG/ML
INJECTION, SOLUTION INTRAVENOUS CONTINUOUS
Status: DISCONTINUED | OUTPATIENT
Start: 2022-08-02 | End: 2022-08-03

## 2022-08-02 RX ORDER — 0.9 % SODIUM CHLORIDE 0.9 %
2100 INTRAVENOUS SOLUTION INTRAVENOUS ONCE
Status: COMPLETED | OUTPATIENT
Start: 2022-08-02 | End: 2022-08-02

## 2022-08-02 RX ORDER — FOLIC ACID 1 MG/1
TABLET ORAL
Status: DISPENSED
Start: 2022-08-02 | End: 2022-08-02

## 2022-08-02 RX ORDER — LORAZEPAM 1 MG/1
3 TABLET ORAL
Status: DISCONTINUED | OUTPATIENT
Start: 2022-08-02 | End: 2022-08-04 | Stop reason: HOSPADM

## 2022-08-02 RX ORDER — LORAZEPAM 1 MG/1
1 TABLET ORAL
Status: DISCONTINUED | OUTPATIENT
Start: 2022-08-02 | End: 2022-08-04 | Stop reason: HOSPADM

## 2022-08-02 RX ADMIN — Medication 10 ML: at 08:35

## 2022-08-02 RX ADMIN — ASPIRIN 81 MG: 81 TABLET, CHEWABLE ORAL at 11:03

## 2022-08-02 RX ADMIN — SODIUM CHLORIDE, PRESERVATIVE FREE 10 ML: 5 INJECTION INTRAVENOUS at 10:22

## 2022-08-02 RX ADMIN — METRONIDAZOLE 500 MG: 500 INJECTION, SOLUTION INTRAVENOUS at 20:40

## 2022-08-02 RX ADMIN — ENOXAPARIN SODIUM 40 MG: 100 INJECTION SUBCUTANEOUS at 14:16

## 2022-08-02 RX ADMIN — CYCLOBENZAPRINE 10 MG: 10 TABLET, FILM COATED ORAL at 20:41

## 2022-08-02 RX ADMIN — AMLODIPINE BESYLATE: 5 TABLET ORAL at 11:09

## 2022-08-02 RX ADMIN — FOLIC ACID 1 MG: 1 TABLET ORAL at 11:29

## 2022-08-02 RX ADMIN — Medication 400 MG: at 11:30

## 2022-08-02 RX ADMIN — CYCLOBENZAPRINE 10 MG: 10 TABLET, FILM COATED ORAL at 14:15

## 2022-08-02 RX ADMIN — IPRATROPIUM BROMIDE AND ALBUTEROL SULFATE 1 AMPULE: .5; 3 SOLUTION RESPIRATORY (INHALATION) at 16:14

## 2022-08-02 RX ADMIN — METRONIDAZOLE 500 MG: 500 INJECTION, SOLUTION INTRAVENOUS at 14:17

## 2022-08-02 RX ADMIN — SODIUM CHLORIDE: 9 INJECTION, SOLUTION INTRAVENOUS at 09:23

## 2022-08-02 RX ADMIN — Medication 10 ML: at 20:41

## 2022-08-02 RX ADMIN — IPRATROPIUM BROMIDE AND ALBUTEROL SULFATE 1 AMPULE: .5; 3 SOLUTION RESPIRATORY (INHALATION) at 12:03

## 2022-08-02 RX ADMIN — Medication 100 MG: at 08:35

## 2022-08-02 RX ADMIN — THERA TABS 1 TABLET: TAB at 08:35

## 2022-08-02 RX ADMIN — Medication 2 PUFF: at 12:05

## 2022-08-02 RX ADMIN — Medication 2 PUFF: at 20:19

## 2022-08-02 RX ADMIN — ATORVASTATIN CALCIUM 20 MG: 20 TABLET, FILM COATED ORAL at 20:41

## 2022-08-02 RX ADMIN — Medication 3 MG: at 21:04

## 2022-08-02 RX ADMIN — CEFTRIAXONE 2000 MG: 2 INJECTION, POWDER, FOR SOLUTION INTRAMUSCULAR; INTRAVENOUS at 11:28

## 2022-08-02 RX ADMIN — SODIUM CHLORIDE 2100 ML: 9 INJECTION, SOLUTION INTRAVENOUS at 04:15

## 2022-08-02 RX ADMIN — SODIUM CHLORIDE 3000 MG: 900 INJECTION INTRAVENOUS at 04:20

## 2022-08-02 RX ADMIN — AMLODIPINE BESYLATE 5 MG: 5 TABLET ORAL at 11:26

## 2022-08-02 RX ADMIN — IPRATROPIUM BROMIDE AND ALBUTEROL SULFATE 1 AMPULE: .5; 3 SOLUTION RESPIRATORY (INHALATION) at 20:19

## 2022-08-02 ASSESSMENT — LIFESTYLE VARIABLES
HOW OFTEN DO YOU HAVE A DRINK CONTAINING ALCOHOL: 2-3 TIMES A WEEK
HOW MANY STANDARD DRINKS CONTAINING ALCOHOL DO YOU HAVE ON A TYPICAL DAY: 3 OR 4

## 2022-08-02 ASSESSMENT — PAIN - FUNCTIONAL ASSESSMENT: PAIN_FUNCTIONAL_ASSESSMENT: NONE - DENIES PAIN

## 2022-08-02 ASSESSMENT — PAIN SCALES - GENERAL: PAINLEVEL_OUTOF10: 9

## 2022-08-02 NOTE — PROGRESS NOTES
[facility]  Respiratory Therapy  Patient Education      Name:  Jacky Tweegee Record Number:  [id number]  Age: 58 y.o.   : 1960  Today's Date:  2022  Room:  B5T-8150/4108-01         Assessment      BP (!) 153/77   Pulse 89   Temp 98.2 °F (36.8 °C) (Oral)   Resp 20   Ht 5' 7\" (1.702 m)   Wt 148 lb 5.9 oz (67.3 kg)   SpO2 95%   BMI 23.24 kg/m²     Patient Active Problem List   Diagnosis    Thyroid goiter    Chest pain    SOB (shortness of breath)    Essential hypertension    Elevated liver enzymes    Alcohol abuse    Folate deficiency    Coronary artery disease involving native coronary artery of native heart without angina pectoris    Other emphysema (HCC)- seen on CT scan     PFO (patent foramen ovale)    Respiratory failure with hypoxia (HCC)       Social History  Social History     Tobacco Use    Smoking status: Every Day     Packs/day: 0.50     Years: 46.00     Pack years: 23.00     Types: Cigarettes     Start date:     Smokeless tobacco: Never   Vaping Use    Vaping Use: Never used   Substance Use Topics    Alcohol use: Yes     Comment: 6 beers and 1 pint of vodka per day    Drug use: Yes     Types: Marijuana Ybarrarose Lee)     Comment: occ       Modality:     HHN      Education       Patient/caregiver was educated on the proper method of use:  Yes        Level of patient/caregiver understanding able to:  Verbalize understanding, Demonstrate understanding, and Teach back     Education status:   Provided instructions on medications, Provided instructions on technique and indications, Provided instructions on adverse reactions, and No education given      Response to education:    Excellent     Teaching Time: 5  minutes         Electronically signed by Zurdo Gordillo RCP on 2022 at 4:20 PM

## 2022-08-02 NOTE — ED PROVIDER NOTES
[facility]      Pt Name: Hamilton Lee MRN: [id number]  Palomogfguevara 1960  Date of evaluation: 8/2/2022  Provider: Ca Gómez DO    CHIEF COMPLAINT  Chief Complaint   Patient presents with    Other     Took 1/2 seraqueal sleepy and vomited x1 friend called ems ems found pt sitting on the curb pt does not remember any of this ,        This patient is a high risk for COVID-19 viral infection. Therefore, full personal protective equipment was used including face shield, N95 mask, head cover, impermeable gown, gloves, and protection was discussed. All equipment was cleaned with with chlorine wipes after I evaluated the patient. HPI  Hamilton Lee is a 58 y.o. male who presents with altered mental status from home. He took half of Seroquel and was sleepy. He vomited 1 time according to her friend and the friend called EMS. Patient was acting abnormally. Pulse ox on EMS arrival was 77%. They placed him on oxygen and came up to low 90s. Patient does not wear oxygen at home. He denies any chest pains. He is not sure what happened. He states he is taken Seroquel in the past and has had no problems. Denies any chest pains or abdominal pain. He denies any extremity pain. He denies any fevers or chills. REVIEW OF SYSTEMS  All systems negative except as noted in the HPI. Reviewed Nurses' notes and concur. No LMP for male patient. PAST MEDICAL HISTORY  Past Medical History:   Diagnosis Date    CAD (coronary artery disease)     Hyperlipidemia     Hypertension        FAMILY HISTORY  Family History   Problem Relation Age of Onset    Diabetes Mother     Liver Disease Father     Liver Disease Brother        SOCIAL HISTORY   reports that he has been smoking cigarettes. He started smoking about 47 years ago. He has a 23.00 pack-year smoking history.  He has never used smokeless tobacco. He reports current alcohol use. He reports current drug use. Drug: Marijuana Judy Judd). SURGICAL HISTORY  No past surgical history on file. CURRENT MEDICATIONS  Current Outpatient Rx   Medication Sig Dispense Refill    amLODIPine (NORVASC) 5 MG tablet TAKE ONE TABLET BY MOUTH DAILY 30 tablet 1    MAGNESIUM-OXIDE 400 (241.3 Mg) MG TABS tablet TAKE ONE TABLET BY MOUTH DAILY 30 tablet 1    magnesium oxide (MAG-OX) 400 MG tablet TAKE ONE TABLET BY MOUTH DAILY 30 tablet 0    tiZANidine (ZANAFLEX) 4 MG tablet TAKE ONE TABLET BY MOUTH FOUR TIMES A DAY AS NEEDED FOR MUSCLE SPASMS 40 tablet 0    naproxen (NAPROSYN) 500 MG tablet Take 1 tablet by mouth 2 times daily (with meals) 60 tablet 2    methylPREDNISolone (MEDROL DOSEPACK) 4 MG tablet Take as directed on package. 1 kit 0    atorvastatin (LIPITOR) 20 MG tablet Take 1 tablet by mouth nightly 30 tablet 3    Magnesium 400 MG TABS Take 1 tablet by mouth daily 30 tablet 0    folic acid (FOLVITE) 1 MG tablet Take 1 tablet by mouth daily 90 tablet 1    budesonide-formoterol (SYMBICORT) 160-4.5 MCG/ACT AERO Inhale 2 puffs into the lungs 2 times daily For long-term control of COPD symptoms. 1 each 2    nicotine (NICODERM CQ) 14 MG/24HR Place 1 patch onto the skin every 24 hours 30 patch 1    aspirin 81 MG chewable tablet Take 1 tablet by mouth daily 30 tablet 3       ALLERGIES  No Known Allergies      PHYSICAL EXAM  VITAL SIGNS: Pulse 94   Temp 98.5 °F (36.9 °C) (Oral)   Resp 22   Ht 5' 7\" (1.702 m)   Wt 148 lb 5.9 oz (67.3 kg)   SpO2 92%   BMI 23.24 kg/m²   Constitutional: Well-developed, well-nourished, appears normal, nontoxic, activity: Resting company on the cart, speaking full sentences, pulse ox 93% on 6 L of oxygen nasal cannula. HENT: Normocephalic, Atraumatic, Bilateral external ears normal, TM's were normal, Mucus membranes are moist and oropharynx is patent and clear, No oral exudates, Nose normal.  Eyes: PERRLA, EOMI, Conjunctiva normal, No discharge. No scleral icterus.   Neck: Normal range of motion, No tenderness, Supple. Lymphatic: No lymphadenopathy noted. Cardiovascular: Normal heart rate, Normal rhythm, no murmurs, no gallops, no rubs. Thorax & Lungs: Normal breath sounds, no respiratory distress, no wheezing, no rales, no rhonchi  Abdomen: Soft, Nontender, No hepatosplenomegaly, No masses, No pulsatile masses, No distension, normal bowel sounds  Skin: Warm, Dry, No erythema, No rash. Musculoskeletal:  No tenderness to palpation, no major deformities noted. Neurologic: Alert & oriented x 3, CN II through XII are intact, normal motor function, normal sensory function, no focal deficits noted, no clonus, no tremors. Psychiatric: Affect normal, Mood normal.    ?  LABORATORY  Labs Reviewed   CBC WITH AUTO DIFFERENTIAL - Abnormal; Notable for the following components:       Result Value    RBC 3.87 (*)     Hematocrit 39.6 (*)     .2 (*)     MCH 35.0 (*)     Lymphocytes Absolute 0.6 (*)     Anisocytosis 1+ (*)     All other components within normal limits   COMPREHENSIVE METABOLIC PANEL - Abnormal; Notable for the following components:    CO2 18 (*)     Anion Gap 22 (*)     Glucose 135 (*)     Creatinine 1.4 (*)     GFR Non- 51 (*)     ALT 87 (*)      (*)     All other components within normal limits   URINALYSIS WITH REFLEX TO CULTURE - Abnormal; Notable for the following components:    Color, UA DARK YELLOW (*)     Clarity, UA TURBID (*)     Ketones, Urine TRACE (*)     Blood, Urine TRACE (*)     Protein,  (*)     Leukocyte Esterase, Urine TRACE (*)     All other components within normal limits   CULTURE, BLOOD 1   CULTURE, BLOOD 1   COVID-19, RAPID   TROPONIN   BRAIN NATRIURETIC PEPTIDE   LACTATE, SEPSIS   LACTATE, SEPSIS   MICROSCOPIC URINALYSIS       ?   EKG  EKG Interpretation    Interpreted by emergency department physician  Time performed: 0331  Time read: 0337    Rhythm: Sinus  Ventricular Rate: 92  QRS Axis: 15  Ectopy: None  Conduction: lactic acid was elevated. The remainder of his work-up was negative. Blood cultures were obtained. Patient was given Unasyn for aspiration pneumonia. Liver functions were mildly elevated. Hospitalist was notified of admission at [address] [place] [address]. The patient's blood pressure was found to be elevated according to CMS/Medicare and the Affordable Care Act/ObamaCare criteria. Elevated blood pressure could occur because of pain or anxiety or other reasons and does not mean that they need to have their blood pressure treated or medications otherwise adjusted. However, this could also be a sign that they will need to have their blood pressure treated or medications changed. The patient was instructed to follow up closely with their personal physician to have their blood pressure rechecked. The patient was instructed to take a list of recent blood pressure readings to their next visit with their personal physician. See discharge instructions for specific medications, discharge information, and treatments. They were verbally instructed to return to emergency if any problems    (This chart has been completed using [address]. Although attempts have been made to ensure accuracy, words and/or phrases may not be transcribed as intended.)    Patient refused pain medicines at the time of their exam.    Tetanus vaccination status reviewed: tetanus re-vaccination not indicated. IMPRESSION(S):  1. Aspiration pneumonia of both lower lobes, unspecified aspiration pneumonia type ([address]    2. Hypoxia    3. Lactic acidosis    4. Confusion        ? Recheck Times: 0330  Critical Care Time: 35 minutes not including billable procedures.     Diagnostic considerations include but are not limited to:  Acute Coronary Syndrome, Congestive Heart Failure, Myocardial Infarction, Pulmonary Embolus, Pneumonia, Pneumothorax, sepsis, DKA, airway obstruction, bronchitis, burn injury, other       Nehemiah Qualia, DO  08/02/22 [id number]

## 2022-08-02 NOTE — CARE COORDINATION
DISCHARGE PLAN: Pt plans to return home with his girlfriend. Pt declined substance abuse treatment information. Girlfriend will arrange to have someone transport pt home at d/c.  ______________________________________________  INITIAL ASSESSMENT  Met w/pt to address barriers to dc. HOME: Pt reported that he resides in an apartment with his girlfriend, Josesito Henderson. There are 2 sets of steps leading into the apartment for a total of 19 steps. Disease Specific: Aspiration pneumonia    COVID Vaccination: Yes-pt reported that he has the booster    DME/O2: None. No DME needs noted. ACTIVE SERVICES: Pt reported that he was independent with all self care PTA and denied the need for any assistance upon d/c. Pt stated that he typically drinks a 1/2 pint of liquor every 3 days and does not see this as a problem. Pt is not interested in any substance abuse treatment information at this time. TRANSPORTATION: Pt is an active  but stated that his truck is currently broke down. Pt stated that his girlfriend will arrange to have someone pick him up at d/c. PHARMACY: Denies difficulty obtaining/taking meds. Pt stated that he has all meds filled at [facility] on [facility]. PCP: JAMMIE Ba CNP     DEMOGRAPHICS: Verified address/phone number as correct    INSURANCE:  [facility]  PRESCRIPTION COVERAGE: [facility]    HD/PD: No    THERAPY RECS  Not ordered      Discharge planning team will remain available for needs. Please consult for any specifics not addressed in this note.     TIFFANY Downs  [phone]  Electronically signed by Riley Ramirez on 8/2/2022 at 11:07 AM

## 2022-08-02 NOTE — ED TRIAGE NOTES
Pt brought to the er tonight by ems pt has been drinking tonight and then he took 1/2 a seraqual, pt vomiting and not his normal self , per wife who called ems.  Per ems they found pt sitting on the curb , pt does not remember any of this , took pill to sleep. R.a. sat 77 % placed on o2/6l/nc

## 2022-08-02 NOTE — H&P
Hospital Medicine History & Physical      PCP: JAMMIE Rod CNP    Date of Admission: 8/2/2022    Date of Service: Pt seen/examined on 08/02/22 and Admitted to Inpatient with expected LOS greater than two midnights due to medical therapy. Chief Complaint:        History Of Present Illness:      58 y.o. male who presented to [facility]     His friends called 911 after he vomited and aspirated. He has a problem with sleeping at night. His daughter shared 1/2 tablet of seroquel with him. He was drinking vodka, shared a pint with a friend. He doesn't really remember what happened after that  but he was brought to the ED for respiratory failure after vomiting and aspirating with sats down to the 70s. He reports feeling just a little whoozy. + cough  + wheeze   No nausea at this time     ROS   No known fevers   Further information limited as he doesn't recall  He denies having any pain     FamHx:   - he reports his daughter has bipolar disorder and drinks \"a lot\"     SocHx:   -  he  reports he usually drinks beer - 6 pack at a time, and liquor   - he smokes 1/2 ppd   - single       Past Medical History:          Diagnosis Date    CAD (coronary artery disease)     Hyperlipidemia     Hypertension        Past Surgical History:      No past surgical history on file. Medications Prior to Admission:      Prior to Admission medications    Medication Sig Start Date End Date Taking?  Authorizing Provider   amLODIPine (NORVASC) 5 MG tablet TAKE ONE TABLET BY MOUTH DAILY 12/28/21   JAMMIE Crews CNP   MAGNESIUM-OXIDE 400 (241.3 Mg) MG TABS tablet TAKE ONE TABLET BY MOUTH DAILY 12/28/21   JAMMIE Crews CNP   magnesium oxide (MAG-OX) 400 MG tablet TAKE ONE TABLET BY MOUTH DAILY 12/13/21   JAMMIE Crews CNP   tiZANidine (ZANAFLEX) 4 MG tablet TAKE ONE TABLET BY MOUTH FOUR TIMES A DAY AS NEEDED FOR MUSCLE SPASMS 12/13/21   Eileen Gonzalez MD   naproxen (NAPROSYN) 500 MG tablet Take 1 tablet by mouth 2 times daily (with meals) 11/1/21   Andrew Serrano MD   methylPREDNISolone (MEDROL DOSEPACK) 4 MG tablet Take as directed on package. 11/1/21   Andrew Serrano MD   atorvastatin (LIPITOR) 20 MG tablet Take 1 tablet by mouth nightly 10/28/21   JAMMIE Wise CNP   Magnesium 400 MG TABS Take 1 tablet by mouth daily 10/28/21   JAMMIE Wise - CNP   folic acid (FOLVITE) 1 MG tablet Take 1 tablet by mouth daily 10/28/21   Malka Salazar APRN - RHIANNON   budesonide-formoterol (SYMBICORT) 160-4.5 MCG/ACT AERO Inhale 2 puffs into the lungs 2 times daily For long-term control of COPD symptoms. 9/27/21   JAMMIE Wise CNP   nicotine (NICODERM CQ) 14 MG/24HR Place 1 patch onto the skin every 24 hours 6/18/21 6/18/22  JAMMIE Wise CNP   aspirin 81 MG chewable tablet Take 1 tablet by mouth daily 6/6/20   Dulce Clark MD       Allergies:  Patient has no known allergies. Social History:      The patient currently lives at home     TOBACCO:   reports that he has been smoking cigarettes. He started smoking about 47 years ago. He has a 23.00 pack-year smoking history. He has never used smokeless tobacco.  ETOH:   reports current alcohol use. E-cigarette/Vaping       Questions Responses    E-cigarette/Vaping Use Never User    Start Date     Passive Exposure     Quit Date     Counseling Given Yes    Comments               Family History:               Problem Relation Age of Onset    Diabetes Mother     Liver Disease Father     Liver Disease Brother        REVIEW OF SYSTEMS COMPLETED:   Pertinent positives as noted in the HPI. All other systems reviewed and negative. PHYSICAL EXAM PERFORMED:    BP (!) 170/90   Pulse 86   Temp 98.6 °F (37 °C) (Oral)   Resp 20   Ht 5' 7\" (1.702 m)   Wt 148 lb 5.9 oz (67.3 kg)   SpO2 91%   BMI 23.24 kg/m²     General appearance:  No apparent distress, appears stated age and cooperative.   HEENT: Normal cephalic, atraumatic without obvious deformity. Pupils equal, round, and reactive to light. Extra ocular muscles intact. Conjunctivae/corneas clear. Neck: Supple, with full range of motion. No jugular venous distention. Trachea midline. Respiratory:  Normal respiratory effort. Clear to auscultation, bilaterally without Rales/Wheezes/Rhonchi.    - lungs presently seem clear but he has a wet sounding cough    Cardiovascular:  Regular rate and rhythm with normal S1/S2 without murmurs, rubs or gallops. Abdomen: Soft, non-tender, non-distended    Musculoskeletal:  No clubbing, cyanosis or edema bilaterally. Full range of motion without deformity. Skin: Skin color, texture, turgor normal.     Psychiatric:  Alert    Capillary Refill: Brisk,3 seconds, normal  Peripheral Pulses: +2 palpable, equal bilaterally       Labs:     Recent Labs     08/02/22  0341   WBC 6.0   HGB 13.6   HCT 39.6*        Recent Labs     08/02/22 0341      K 3.9      CO2 18*   BUN 8   CREATININE 1.4*   CALCIUM 9.0     Recent Labs     08/02/22 0341   *   ALT 87*   BILITOT 0.9   ALKPHOS 105     No results for input(s): INR in the last 72 hours. Recent Labs     08/02/22 0341   TROPONINI <0.01       Urinalysis:      Lab Results   Component Value Date/Time    NITRU Negative 08/02/2022 04:00 AM    WBCUA 4 08/02/2022 04:00 AM    BACTERIA Rare 08/02/2022 04:00 AM    RBCUA 3 08/02/2022 04:00 AM    BLOODU TRACE 08/02/2022 04:00 AM    SPECGRAV 1.017 08/02/2022 04:00 AM    GLUCOSEU Negative 08/02/2022 04:00 AM       Radiology:     CXR: I have reviewed the CXR with the following interpretation:   EKG:  I have reviewed the EKG with the following interpretation:     XR CHEST PORTABLE   Final Result   1. Mildly prominent peribronchial markings seen at the lung bases, right   greater than left. This can be seen in the setting of bronchiolitis which   could have a similar appearance to aspiration as well.    2. No focal consolidation or other acute process. Consults:    None    ASSESSMENT:    Active Hospital Problems    Diagnosis Date Noted    Respiratory failure with hypoxia [facility]) [J96.91] 08/02/2022     Priority: Pooja Purcell Sr. is a 58 y.o. male     Hypoxic respiratory failure with aspiration pneumonia:   - ceftriaxone IV daily   - urine drug screen   - oxygen  - duonebs q4 hrs   - cont symbicort equivalent - (formulary dulera BID)     NICOLE:   - NS @ 100     Abnormal UA:   - he does report groin pain but he denies dysuria, reports he doesn't think he needs STD screening   - urine culture pending   - on ceftriaxone    Nausea:   - ondansetron prn     Alcohol abuse:   - CIWA protocol  - he denies going to AA   - he reports knowing he needs to cut down on drinking his alcohol  and does not want to see a    - thiamine   - MV     CAD:   - aspirin 81 mg po daily     Hypertension:   - amlodipine 5 mg po daily     Dyslipidemia:   - atorvastatin 20 mg po daily     Tobacco use:   - nicotine patch ordered     Muscle spasms:   - tizanidine HELD     Supplement:   - folic acid po daily   - magnesium po daily     DVT Prophylaxis: enoxaparin   Diet: No diet orders on file  Code Status: Prior - FULL  CODE   - alternative decision maker - Alexandro Lazaro (girlfriend)     PT/OT Eval Status: not consulted    Dispo - pending improvement        Lashell Tom MD    Thank you JAMMIE Ayala - RHIANNON for the opportunity to be involved in this patient's care.

## 2022-08-02 NOTE — PROGRESS NOTES
Medication Reconciliation    List of medications patient is currently taking is complete. Source of information: 1. Conversation with patient at bedside                                      2. EPIC records                                       3. Dispense history     Allergies  Patient has no known allergies. Notes regarding home medications:   1. Patient has been out of refills of medications for a few months. Current medication list is c/w meds filled in December.

## 2022-08-02 NOTE — ACP (ADVANCE CARE PLANNING)
Advance Care Planning     Advance Care Planning Activator (Inpatient)  Conversation Note      Date of ACP Conversation: 8/2/2022     Conversation Conducted with: Patient with Decision Making Capacity    ACP [facility] Decision Maker:     Current Designated Health Care Decision Maker:     Primary Decision Maker: Vonda Moises - Child    Today we documented Decision Maker(s) consistent with Legal Next of Kin hierarchy. Care Preferences    Ventilation: \"If you were in your present state of health and suddenly became very ill and were unable to breathe on your own, what would your preference be about the use of a ventilator (breathing machine) if it were available to you? \"      Would the patient desire the use of ventilator (breathing machine)?: yes    \"If your health worsens and it becomes clear that your chance of recovery is unlikely, what would your preference be about the use of a ventilator (breathing machine) if it were available to you? \"     Would the patient desire the use of ventilator (breathing machine)?: No      Resuscitation  \"CPR works best to restart the heart when there is a sudden event, like a heart attack, in someone who is otherwise healthy. Unfortunately, CPR does not typically restart the heart for people who have serious health conditions or who are very sick. \"    \"In the event your heart stopped as a result of an underlying serious health condition, would you want attempts to be made to restart your heart (answer \"yes\" for attempt to resuscitate) or would you prefer a natural death (answer \"no\" for do not attempt to resuscitate)? \" yes       [] Yes   [x] No   Educated Patient / Mortimer Hoar regarding differences between Advance Directives and portable DNR orders.     Length of ACP Conversation in minutes:  5    Conversation Outcomes:  [x] ACP discussion completed  [] Existing advance directive reviewed with patient; no changes to patient's previously recorded wishes  [] New Advance Directive completed  [] Portable Do Not Rescitate prepared for Provider review and signature  [] POLST/POST/MOLST/MOST prepared for Provider review and signature      Follow-up plan:    [] Schedule follow-up conversation to continue planning  [] Referred individual to Provider for additional questions/concerns   [] Advised patient/agent/surrogate to review completed ACP document and update if needed with changes in condition, patient preferences or care setting    [x] This note routed to one or more involved healthcare providers    {Electronically signed by Kylah Mo on 8/2/2022 at 11:10 AM

## 2022-08-03 LAB
ANION GAP SERPL CALCULATED.3IONS-SCNC: 12 MMOL/L (ref 3–16)
BASOPHILS ABSOLUTE: 0 K/UL (ref 0–0.2)
BASOPHILS RELATIVE PERCENT: 0.5 %
BUN BLDV-MCNC: 5 MG/DL (ref 7–20)
CALCIUM SERPL-MCNC: 8.4 MG/DL (ref 8.3–10.6)
CHLORIDE BLD-SCNC: 104 MMOL/L (ref 99–110)
CO2: 23 MMOL/L (ref 21–32)
CREAT SERPL-MCNC: 0.6 MG/DL (ref 0.8–1.3)
EOSINOPHILS ABSOLUTE: 0.1 K/UL (ref 0–0.6)
EOSINOPHILS RELATIVE PERCENT: 1.5 %
GFR AFRICAN AMERICAN: >60
GFR NON-AFRICAN AMERICAN: >60
GLUCOSE BLD-MCNC: 101 MG/DL (ref 70–99)
HCT VFR BLD CALC: 33.2 % (ref 40.5–52.5)
HEMOGLOBIN: 11.3 G/DL (ref 13.5–17.5)
LYMPHOCYTES ABSOLUTE: 1.5 K/UL (ref 1–5.1)
LYMPHOCYTES RELATIVE PERCENT: 28.3 %
MAGNESIUM: 1.6 MG/DL (ref 1.8–2.4)
MCH RBC QN AUTO: 34.6 PG (ref 26–34)
MCHC RBC AUTO-ENTMCNC: 34.1 G/DL (ref 31–36)
MCV RBC AUTO: 101.6 FL (ref 80–100)
MONOCYTES ABSOLUTE: 0.7 K/UL (ref 0–1.3)
MONOCYTES RELATIVE PERCENT: 13.7 %
NEUTROPHILS ABSOLUTE: 3 K/UL (ref 1.7–7.7)
NEUTROPHILS RELATIVE PERCENT: 56 %
PDW BLD-RTO: 14.5 % (ref 12.4–15.4)
PLATELET # BLD: 155 K/UL (ref 135–450)
PMV BLD AUTO: 7.7 FL (ref 5–10.5)
POTASSIUM REFLEX MAGNESIUM: 3.4 MMOL/L (ref 3.5–5.1)
RBC # BLD: 3.27 M/UL (ref 4.2–5.9)
SODIUM BLD-SCNC: 139 MMOL/L (ref 136–145)
WBC # BLD: 5.4 K/UL (ref 4–11)

## 2022-08-03 PROCEDURE — 2700000000 HC OXYGEN THERAPY PER DAY

## 2022-08-03 PROCEDURE — 6370000000 HC RX 637 (ALT 250 FOR IP): Performed by: PEDIATRICS

## 2022-08-03 PROCEDURE — 94761 N-INVAS EAR/PLS OXIMETRY MLT: CPT

## 2022-08-03 PROCEDURE — 83735 ASSAY OF MAGNESIUM: CPT

## 2022-08-03 PROCEDURE — 6370000000 HC RX 637 (ALT 250 FOR IP): Performed by: STUDENT IN AN ORGANIZED HEALTH CARE EDUCATION/TRAINING PROGRAM

## 2022-08-03 PROCEDURE — 2500000003 HC RX 250 WO HCPCS: Performed by: STUDENT IN AN ORGANIZED HEALTH CARE EDUCATION/TRAINING PROGRAM

## 2022-08-03 PROCEDURE — 36415 COLL VENOUS BLD VENIPUNCTURE: CPT

## 2022-08-03 PROCEDURE — 94640 AIRWAY INHALATION TREATMENT: CPT

## 2022-08-03 PROCEDURE — 6360000002 HC RX W HCPCS: Performed by: PEDIATRICS

## 2022-08-03 PROCEDURE — 80048 BASIC METABOLIC PNL TOTAL CA: CPT

## 2022-08-03 PROCEDURE — 1200000000 HC SEMI PRIVATE

## 2022-08-03 PROCEDURE — 6370000000 HC RX 637 (ALT 250 FOR IP): Performed by: NURSE PRACTITIONER

## 2022-08-03 PROCEDURE — 85025 COMPLETE CBC W/AUTO DIFF WBC: CPT

## 2022-08-03 PROCEDURE — 2580000003 HC RX 258: Performed by: PEDIATRICS

## 2022-08-03 RX ORDER — POTASSIUM CHLORIDE 20 MEQ/1
40 TABLET, EXTENDED RELEASE ORAL ONCE
Status: COMPLETED | OUTPATIENT
Start: 2022-08-03 | End: 2022-08-03

## 2022-08-03 RX ADMIN — ASPIRIN 81 MG: 81 TABLET, CHEWABLE ORAL at 08:28

## 2022-08-03 RX ADMIN — ATORVASTATIN CALCIUM 20 MG: 20 TABLET, FILM COATED ORAL at 21:03

## 2022-08-03 RX ADMIN — IPRATROPIUM BROMIDE AND ALBUTEROL SULFATE 1 AMPULE: .5; 3 SOLUTION RESPIRATORY (INHALATION) at 20:03

## 2022-08-03 RX ADMIN — SODIUM CHLORIDE: 9 INJECTION, SOLUTION INTRAVENOUS at 10:38

## 2022-08-03 RX ADMIN — Medication 400 MG: at 08:29

## 2022-08-03 RX ADMIN — FOLIC ACID 1 MG: 1 TABLET ORAL at 08:29

## 2022-08-03 RX ADMIN — Medication 3 MG: at 21:03

## 2022-08-03 RX ADMIN — METRONIDAZOLE 500 MG: 500 INJECTION, SOLUTION INTRAVENOUS at 10:38

## 2022-08-03 RX ADMIN — METRONIDAZOLE 500 MG: 500 INJECTION, SOLUTION INTRAVENOUS at 02:16

## 2022-08-03 RX ADMIN — CEFTRIAXONE 2000 MG: 2 INJECTION, POWDER, FOR SOLUTION INTRAMUSCULAR; INTRAVENOUS at 08:27

## 2022-08-03 RX ADMIN — SODIUM CHLORIDE: 9 INJECTION, SOLUTION INTRAVENOUS at 02:15

## 2022-08-03 RX ADMIN — THERA TABS 1 TABLET: TAB at 08:27

## 2022-08-03 RX ADMIN — CYCLOBENZAPRINE 10 MG: 10 TABLET, FILM COATED ORAL at 08:28

## 2022-08-03 RX ADMIN — DICLOFENAC SODIUM 4 G: 10 GEL TOPICAL at 21:03

## 2022-08-03 RX ADMIN — DICLOFENAC SODIUM 4 G: 10 GEL TOPICAL at 16:06

## 2022-08-03 RX ADMIN — IPRATROPIUM BROMIDE AND ALBUTEROL SULFATE 1 AMPULE: .5; 3 SOLUTION RESPIRATORY (INHALATION) at 07:32

## 2022-08-03 RX ADMIN — CYCLOBENZAPRINE 10 MG: 10 TABLET, FILM COATED ORAL at 21:03

## 2022-08-03 RX ADMIN — IPRATROPIUM BROMIDE AND ALBUTEROL SULFATE 1 AMPULE: .5; 3 SOLUTION RESPIRATORY (INHALATION) at 11:44

## 2022-08-03 RX ADMIN — METRONIDAZOLE 500 MG: 500 INJECTION, SOLUTION INTRAVENOUS at 17:35

## 2022-08-03 RX ADMIN — IPRATROPIUM BROMIDE AND ALBUTEROL SULFATE 1 AMPULE: .5; 3 SOLUTION RESPIRATORY (INHALATION) at 15:34

## 2022-08-03 RX ADMIN — SODIUM CHLORIDE: 9 INJECTION, SOLUTION INTRAVENOUS at 17:35

## 2022-08-03 RX ADMIN — Medication 100 MG: at 08:29

## 2022-08-03 RX ADMIN — AMLODIPINE BESYLATE 5 MG: 5 TABLET ORAL at 08:28

## 2022-08-03 RX ADMIN — SODIUM CHLORIDE, PRESERVATIVE FREE 10 ML: 5 INJECTION INTRAVENOUS at 21:06

## 2022-08-03 RX ADMIN — Medication 2 PUFF: at 20:03

## 2022-08-03 RX ADMIN — POTASSIUM CHLORIDE 40 MEQ: 1500 TABLET, EXTENDED RELEASE ORAL at 10:35

## 2022-08-03 RX ADMIN — Medication 2 PUFF: at 07:42

## 2022-08-03 RX ADMIN — ENOXAPARIN SODIUM 40 MG: 100 INJECTION SUBCUTANEOUS at 08:27

## 2022-08-03 ASSESSMENT — PAIN SCALES - GENERAL
PAINLEVEL_OUTOF10: 0
PAINLEVEL_OUTOF10: 0

## 2022-08-03 NOTE — FLOWSHEET NOTE
Patient requesting something for sleep. Secure message sent to Jo Fenton NP, order obtained for melatonin.

## 2022-08-03 NOTE — PROGRESS NOTES
Hospitalist Progress Note      PCP: JAMMIE Dodge - CNP    Date of Admission: 8/2/2022    Chief Complaint: aspiration. Hospital Course:    58year old female patient with a past medical history of CAD, hypertension, hyperlipidemia. Patient was brought to ED following an aspiration event. He has a problem with sleeping at night. His daughter shared 1/2 tablet of seroquel with him. He was drinking vodka, shared a pint with a friend. He doesn't really remember what happened after that  but he was brought to the ED for respiratory failure after vomiting and aspirating with sats down to the 70s.     Subjective:   Continues to be on 4 L of oxygen via nasal cannula, reports improved back pain      Medications:  Reviewed    Infusion Medications    sodium chloride      sodium chloride Stopped (08/03/22 0211)    sodium chloride 25 mL/hr at 08/03/22 0532     Scheduled Medications    potassium chloride  40 mEq Oral Once    amLODIPine  5 mg Oral Daily    aspirin  81 mg Oral Daily    atorvastatin  20 mg Oral Nightly    mometasone-formoterol  2 puff Inhalation BID    folic acid  1 mg Oral Daily    sodium chloride flush  5-40 mL IntraVENous 2 times per day    enoxaparin  40 mg SubCUTAneous Daily    cefTRIAXone (ROCEPHIN) IV  2,000 mg IntraVENous Q24H    ipratropium-albuterol  1 ampule Inhalation 4x daily    sodium chloride flush  5-40 mL IntraVENous 2 times per day    thiamine  100 mg Oral Daily    multivitamin  1 tablet Oral Daily    nicotine  1 patch TransDERmal Daily    metroNIDAZOLE  500 mg IntraVENous Q8H    magnesium oxide  400 mg Oral Daily    lidocaine  1 patch TransDERmal Daily    cyclobenzaprine  10 mg Oral BID     PRN Meds: sodium chloride flush, sodium chloride, ondansetron **OR** ondansetron, polyethylene glycol, acetaminophen **OR** acetaminophen, sodium chloride flush, sodium chloride, LORazepam **OR** LORazepam **OR** LORazepam **OR** LORazepam **OR** LORazepam **OR** LORazepam **OR** LORazepam **OR** LORazepam, melatonin      Intake/Output Summary (Last 24 hours) at 8/3/2022 0832  Last data filed at 8/3/2022 0532  Gross per 24 hour   Intake 2359.77 ml   Output 2270 ml   Net 89.77 ml       Physical Exam Performed:    BP (!) 145/81   Pulse 80   Temp 98.1 °F (36.7 °C) (Oral)   Resp 18   Ht 5' 7\" (1.702 m)   Wt 160 lb 15 oz (73 kg)   SpO2 95%   BMI 25.21 kg/m²     General appearance: No apparent distress, appears stated age and cooperative. On oxygen via nasal cannula  HEENT: Pupils equal, round, and reactive to light. Conjunctivae/corneas clear. Neck: Supple, with full range of motion. No jugular venous distention. Trachea midline. Respiratory:  Normal respiratory effort. Clear to auscultation, bilaterally without Rales/Wheezes/Rhonchi. Cardiovascular: Regular rate and rhythm with normal S1/S2 without murmurs, rubs or gallops. Abdomen: Soft, non-tender, non-distended with normal bowel sounds. Musculoskeletal: No clubbing, cyanosis or edema bilaterally. Full range of motion without deformity. Skin: Skin color, texture, turgor normal.  No rashes or lesions. Neurologic:  Neurovascularly intact without any focal sensory/motor deficits. Cranial nerves: II-XII intact, grossly non-focal.  Psychiatric: Alert and oriented, thought content appropriate, normal insight  Capillary Refill: Brisk,3 seconds, normal   Peripheral Pulses: +2 palpable, equal bilaterally       Labs:   Recent Labs     08/02/22 0341 08/03/22 0528   WBC 6.0 5.4   HGB 13.6 11.3*   HCT 39.6* 33.2*    155     Recent Labs     08/02/22 0341 08/03/22  0528    139   K 3.9 3.4*    104   CO2 18* 23   BUN 8 5*   CREATININE 1.4* 0.6*   CALCIUM 9.0 8.4     Recent Labs     08/02/22 0341   *   ALT 87*   BILITOT 0.9   ALKPHOS 105     No results for input(s): INR in the last 72 hours.   Recent Labs     08/02/22 0341   TROPONINI <0.01       Urinalysis:      Lab Results   Component Value Date/Time    NITRU Negative 08/02/2022 04:00 AM    WBCUA 4 08/02/2022 04:00 AM    BACTERIA Rare 08/02/2022 04:00 AM    RBCUA 3 08/02/2022 04:00 AM    BLOODU TRACE 08/02/2022 04:00 AM    SPECGRAV 1.017 08/02/2022 04:00 AM    GLUCOSEU Negative 08/02/2022 04:00 AM       Radiology:  XR CHEST PORTABLE   Final Result   1. Mildly prominent peribronchial markings seen at the lung bases, right   greater than left. This can be seen in the setting of bronchiolitis which   could have a similar appearance to aspiration as well. 2. No focal consolidation or other acute process. Assessment/Plan:    Active Hospital Problems    Diagnosis     Respiratory failure with hypoxia ([facility] [address] [J96.91]      Priority: Medium     Acute Hypoxic respiratory failure   aspiration pneumonia:   - ceftriaxone IV daily, will add Flagyl ( will give for 5 days )   -Wean down oxygen  - duonebs q4 hrs  - cont symbicort equivalent - (formulary dulera BID)     Back pain  Add Flexeril and lidocaine oatch     NICOLE (resolved)   DC IV fluids     Alcohol abuse:   - CIWA protocol  - he denies going to AA  - he reports knowing he needs to cut down on drinking his alcohol  and does not want to see a   - thiamine  - MV      CAD:  - aspirin 81 mg po daily     Hypertension:  - amlodipine 5 mg po daily     Dyslipidemia:  - atorvastatin 20 mg po daily     Tobacco use:   - nicotine patch ordered      Muscle spasms:  - tizanidine HELD     Supplement:  - folic acid po daily  - magnesium po daily     DVT Prophylaxis: lovenox  Diet: ADULT DIET;  Regular  Code Status: Full Code    PT/OT Eval Status: baseline     Dispo - pending clinical improvement      Olena Alaniz MD

## 2022-08-03 NOTE — PLAN OF CARE
Problem: Discharge Planning  Goal: Discharge to home or other facility with appropriate resources  8/3/2022 1102 by Riri Armendariz RN  Outcome: Progressing  8/2/2022 2142 by Tuyet Henderson RN  Outcome: Progressing  Flowsheets (Taken 8/2/2022 2050)  Discharge to home or other facility with appropriate resources:   Identify barriers to discharge with patient and caregiver   Arrange for needed discharge resources and transportation as appropriate   Identify discharge learning needs (meds, wound care, etc)     Problem: Safety - Adult  Goal: Free from fall injury  8/3/2022 1102 by Riri Armendariz RN  Outcome: Progressing  Flowsheets (Taken 8/3/2022 1102)  Free From Fall Injury: Instruct family/caregiver on patient safety  8/2/2022 2142 by Tuyet Henderson RN  Outcome: Progressing     Problem: Musculoskeletal - Adult  Goal: Return mobility to safest level of function  8/3/2022 1102 by Riri Armendariz RN  Outcome: Progressing  8/2/2022 2142 by Tuyet Henderson RN  Outcome: Progressing  Flowsheets (Taken 8/2/2022 2050)  Return Mobility to Safest Level of Function:   Assess patient stability and activity tolerance for standing, transferring and ambulating with or without assistive devices   Assist with transfers and ambulation using safe patient handling equipment as needed  Goal: Return ADL status to a safe level of function  8/3/2022 1102 by Riri Armendariz RN  Outcome: Progressing  8/2/2022 2142 by Tuyet Henderson RN  Outcome: Progressing  Flowsheets (Taken 8/2/2022 2050)  Return ADL Status to a Safe Level of Function:   Administer medication as ordered   Assess activities of daily living deficits and provide assistive devices as needed   Obtain physical therapy/occupational therapy consults as needed     Problem: ABCDS Injury Assessment  Goal: Absence of physical injury  Outcome: Progressing  Flowsheets (Taken 8/3/2022 1102)  Absence of Physical Injury: Implement safety measures based on patient assessment

## 2022-08-03 NOTE — CARE COORDINATION
A quick chart review reveals that this patient is from home with family. On IVAB, waiting on recs and 4L oxygen, nondependent. Declined substance abuse treatment per previous  notes. Respectfully submitted,    DIXIE Butt-S  [address] [place] [address]   [phone]    Electronically signed by STEPHEN Munoz on 8/3/2022 at 9:27 AM

## 2022-08-03 NOTE — PLAN OF CARE
Problem: Discharge Planning  Goal: Discharge to home or other facility with appropriate resources  8/2/2022 2142 by Eduarda Arias RN  Outcome: Progressing  8/2/2022 1019 by Cyndy Redmond RN  Outcome: Progressing     Problem: Safety - Adult  Goal: Free from fall injury  Outcome: Progressing     Problem: Musculoskeletal - Adult  Goal: Return mobility to safest level of function  Outcome: Progressing  Goal: Return ADL status to a safe level of function  Outcome: Progressing

## 2022-08-03 NOTE — PROGRESS NOTES
Physician Progress Note      PATIENT:               Yves Lemon  CSN #:                  [id number]  :                       1960  ADMIT DATE:       2022 3:07 AM  DISCH DATE:  RESPONDING  PROVIDER #:        Josee Lobato          QUERY TEXT:    Pt admitted with aspiration pneumonia after vomiting following taking Seroquel   not prescribed for him and drinking alcohol. Respiratory failure with   hypoxia documented. If possible, please document in progress notes and   discharge summary further specificity regarding the acuity of respiratory   failure: The medical record reflects the following:  Risk Factors:  vomiting with aspiration  Clinical Indicators:  O2 sat 77% on arrival, currently 98% on 5L  Treatment: supplemental O2, monitoring O2 sat    Thank you,  Ke Rangle RN, BSN, CCDS  [email or web address]  Options provided:  -- Acute respiratory failure with hypoxia  -- Acute on chronic respiratory failure with hypoxia  -- Chronic respiratory failure with hypoxia  -- Other - I will add my own diagnosis  -- Disagree - Not applicable / Not valid  -- Disagree - Clinically unable to determine / Unknown  -- Refer to Clinical Documentation Reviewer    PROVIDER RESPONSE TEXT:    This patient is in acute respiratory failure with hypoxia.     Query created by: Keisha Godinez on 8/3/2022 10:29 AM      Electronically signed by:  Josee Lobato 8/3/2022 1:29 PM

## 2022-08-04 VITALS
HEART RATE: 65 BPM | HEIGHT: 67 IN | DIASTOLIC BLOOD PRESSURE: 89 MMHG | WEIGHT: 149.47 LBS | BODY MASS INDEX: 23.46 KG/M2 | TEMPERATURE: 98.8 F | OXYGEN SATURATION: 95 % | SYSTOLIC BLOOD PRESSURE: 170 MMHG | RESPIRATION RATE: 18 BRPM

## 2022-08-04 LAB
ANION GAP SERPL CALCULATED.3IONS-SCNC: 14 MMOL/L (ref 3–16)
BASOPHILS ABSOLUTE: 0 K/UL (ref 0–0.2)
BASOPHILS RELATIVE PERCENT: 0.7 %
BUN BLDV-MCNC: 5 MG/DL (ref 7–20)
CALCIUM SERPL-MCNC: 9.4 MG/DL (ref 8.3–10.6)
CHLORIDE BLD-SCNC: 104 MMOL/L (ref 99–110)
CO2: 22 MMOL/L (ref 21–32)
CREAT SERPL-MCNC: 0.7 MG/DL (ref 0.8–1.3)
EOSINOPHILS ABSOLUTE: 0.2 K/UL (ref 0–0.6)
EOSINOPHILS RELATIVE PERCENT: 3.4 %
GFR AFRICAN AMERICAN: >60
GFR NON-AFRICAN AMERICAN: >60
GLUCOSE BLD-MCNC: 129 MG/DL (ref 70–99)
HCT VFR BLD CALC: 36.7 % (ref 40.5–52.5)
HEMOGLOBIN: 12.5 G/DL (ref 13.5–17.5)
LYMPHOCYTES ABSOLUTE: 1.3 K/UL (ref 1–5.1)
LYMPHOCYTES RELATIVE PERCENT: 23.5 %
MCH RBC QN AUTO: 34.3 PG (ref 26–34)
MCHC RBC AUTO-ENTMCNC: 33.9 G/DL (ref 31–36)
MCV RBC AUTO: 101.2 FL (ref 80–100)
MONOCYTES ABSOLUTE: 0.8 K/UL (ref 0–1.3)
MONOCYTES RELATIVE PERCENT: 13.4 %
NEUTROPHILS ABSOLUTE: 3.4 K/UL (ref 1.7–7.7)
NEUTROPHILS RELATIVE PERCENT: 59 %
PDW BLD-RTO: 14.3 % (ref 12.4–15.4)
PLATELET # BLD: 164 K/UL (ref 135–450)
PLATELET SLIDE REVIEW: ADEQUATE
PMV BLD AUTO: 7.8 FL (ref 5–10.5)
POTASSIUM SERPL-SCNC: 3.4 MMOL/L (ref 3.5–5.1)
RBC # BLD: 3.63 M/UL (ref 4.2–5.9)
RBC # BLD: NORMAL 10*6/UL
SLIDE REVIEW: ABNORMAL
SODIUM BLD-SCNC: 140 MMOL/L (ref 136–145)
WBC # BLD: 5.7 K/UL (ref 4–11)

## 2022-08-04 PROCEDURE — 80048 BASIC METABOLIC PNL TOTAL CA: CPT

## 2022-08-04 PROCEDURE — 36415 COLL VENOUS BLD VENIPUNCTURE: CPT

## 2022-08-04 PROCEDURE — 94640 AIRWAY INHALATION TREATMENT: CPT

## 2022-08-04 PROCEDURE — 6370000000 HC RX 637 (ALT 250 FOR IP): Performed by: PEDIATRICS

## 2022-08-04 PROCEDURE — 94761 N-INVAS EAR/PLS OXIMETRY MLT: CPT

## 2022-08-04 PROCEDURE — 6370000000 HC RX 637 (ALT 250 FOR IP): Performed by: STUDENT IN AN ORGANIZED HEALTH CARE EDUCATION/TRAINING PROGRAM

## 2022-08-04 PROCEDURE — 85025 COMPLETE CBC W/AUTO DIFF WBC: CPT

## 2022-08-04 PROCEDURE — 2500000003 HC RX 250 WO HCPCS: Performed by: STUDENT IN AN ORGANIZED HEALTH CARE EDUCATION/TRAINING PROGRAM

## 2022-08-04 PROCEDURE — 2700000000 HC OXYGEN THERAPY PER DAY

## 2022-08-04 PROCEDURE — 6360000002 HC RX W HCPCS: Performed by: PEDIATRICS

## 2022-08-04 PROCEDURE — 2580000003 HC RX 258: Performed by: PEDIATRICS

## 2022-08-04 RX ORDER — POTASSIUM CHLORIDE 750 MG/1
40 TABLET, FILM COATED, EXTENDED RELEASE ORAL ONCE
Status: COMPLETED | OUTPATIENT
Start: 2022-08-04 | End: 2022-08-04

## 2022-08-04 RX ORDER — AMLODIPINE BESYLATE 5 MG/1
TABLET ORAL
Qty: 30 TABLET | Refills: 1 | Status: SHIPPED | OUTPATIENT
Start: 2022-08-04 | End: 2022-09-27 | Stop reason: SDUPTHER

## 2022-08-04 RX ORDER — ATORVASTATIN CALCIUM 20 MG/1
20 TABLET, FILM COATED ORAL NIGHTLY
Qty: 30 TABLET | Refills: 3 | Status: SHIPPED | OUTPATIENT
Start: 2022-08-04 | End: 2022-09-27 | Stop reason: SDUPTHER

## 2022-08-04 RX ORDER — ASPIRIN 81 MG/1
81 TABLET, CHEWABLE ORAL DAILY
Qty: 30 TABLET | Refills: 3 | Status: SHIPPED | OUTPATIENT
Start: 2022-08-04 | End: 2022-09-27 | Stop reason: SDUPTHER

## 2022-08-04 RX ORDER — AMOXICILLIN AND CLAVULANATE POTASSIUM 500; 125 MG/1; MG/1
1 TABLET, FILM COATED ORAL 3 TIMES DAILY
Qty: 15 TABLET | Refills: 0 | Status: SHIPPED | OUTPATIENT
Start: 2022-08-04 | End: 2022-08-09

## 2022-08-04 RX ADMIN — CEFTRIAXONE 2000 MG: 2 INJECTION, POWDER, FOR SOLUTION INTRAMUSCULAR; INTRAVENOUS at 09:06

## 2022-08-04 RX ADMIN — AMLODIPINE BESYLATE 5 MG: 5 TABLET ORAL at 08:48

## 2022-08-04 RX ADMIN — ASPIRIN 81 MG: 81 TABLET, CHEWABLE ORAL at 08:48

## 2022-08-04 RX ADMIN — Medication 100 MG: at 08:48

## 2022-08-04 RX ADMIN — Medication 2 PUFF: at 09:11

## 2022-08-04 RX ADMIN — FOLIC ACID 1 MG: 1 TABLET ORAL at 08:49

## 2022-08-04 RX ADMIN — POTASSIUM CHLORIDE 40 MEQ: 750 TABLET, FILM COATED, EXTENDED RELEASE ORAL at 11:32

## 2022-08-04 RX ADMIN — THERA TABS 1 TABLET: TAB at 08:48

## 2022-08-04 RX ADMIN — METRONIDAZOLE 500 MG: 500 INJECTION, SOLUTION INTRAVENOUS at 10:17

## 2022-08-04 RX ADMIN — METRONIDAZOLE 500 MG: 500 INJECTION, SOLUTION INTRAVENOUS at 01:46

## 2022-08-04 RX ADMIN — Medication 10 ML: at 08:49

## 2022-08-04 RX ADMIN — ENOXAPARIN SODIUM 40 MG: 100 INJECTION SUBCUTANEOUS at 08:49

## 2022-08-04 RX ADMIN — IPRATROPIUM BROMIDE AND ALBUTEROL SULFATE 1 AMPULE: .5; 3 SOLUTION RESPIRATORY (INHALATION) at 09:10

## 2022-08-04 RX ADMIN — DICLOFENAC SODIUM 4 G: 10 GEL TOPICAL at 09:03

## 2022-08-04 RX ADMIN — Medication 400 MG: at 08:48

## 2022-08-04 RX ADMIN — SODIUM CHLORIDE, PRESERVATIVE FREE 10 ML: 5 INJECTION INTRAVENOUS at 09:06

## 2022-08-04 NOTE — CARE COORDINATION
DISCHARGE SUMMARY     DATE OF DISCHARGE: 08/04/2022    DISCHARGE DESTINATION: Home      TRANSPORTATION: Pt stated that a friend will pick him up or he will schedule an James. COMMENTS: Pt stated that he has no d/c needs at this time.    TIFFANY Pisano  [phone]  Electronically signed by Dionne Andrade on 8/4/2022 at 11:13 AM

## 2022-08-04 NOTE — PLAN OF CARE
Problem: Discharge Planning  Goal: Discharge to home or other facility with appropriate resources  8/4/2022 1202 by Ihsan Jane RN  Outcome: Completed  8/4/2022 1044 by Ihsan Jane RN  Outcome: Progressing  8/3/2022 2230 by Darinel Maza RN  Outcome: Progressing     Problem: Safety - Adult  Goal: Free from fall injury  8/4/2022 1202 by Ihsan Jane RN  Outcome: Completed  8/4/2022 1044 by Ihsan Jane RN  Outcome: Progressing  8/3/2022 2230 by Darinel Maza RN  Outcome: Progressing     Problem: Musculoskeletal - Adult  Goal: Return mobility to safest level of function  8/4/2022 1202 by Ihsan Jane RN  Outcome: Completed  8/4/2022 1044 by Ihsan Jane RN  Outcome: Progressing  8/3/2022 2230 by Darinel Maza RN  Outcome: Progressing  Goal: Return ADL status to a safe level of function  8/4/2022 1202 by Ihsan Jane RN  Outcome: Completed  8/4/2022 1044 by Ihsan Jane RN  Outcome: Progressing  8/3/2022 2230 by Darinel Maza RN  Outcome: Progressing     Problem: ABCDS Injury Assessment  Goal: Absence of physical injury  8/4/2022 1202 by Ihsan Jane RN  Outcome: Completed  8/4/2022 1044 by Ihsan Jane RN  Outcome: Progressing  8/3/2022 2230 by Darinel Maza RN  Outcome: Progressing

## 2022-08-04 NOTE — PLAN OF CARE
Problem: Discharge Planning  Goal: Discharge to home or other facility with appropriate resources  8/4/2022 1044 by Flako Wang RN  Outcome: Progressing  8/3/2022 2230 by Claude Crafts, RN  Outcome: Progressing     Problem: Safety - Adult  Goal: Free from fall injury  8/4/2022 1044 by Flako Wang RN  Outcome: Progressing  8/3/2022 2230 by Claude Crafts, RN  Outcome: Progressing     Problem: Musculoskeletal - Adult  Goal: Return mobility to safest level of function  8/4/2022 1044 by Flako Wang RN  Outcome: Progressing  8/3/2022 2230 by Claude Crafts, RN  Outcome: Progressing  Goal: Return ADL status to a safe level of function  8/4/2022 1044 by Flako Wang RN  Outcome: Progressing  8/3/2022 2230 by Claude Crafts, RN  Outcome: Progressing     Problem: ABCDS Injury Assessment  Goal: Absence of physical injury  8/4/2022 1044 by Flako Wang RN  Outcome: Progressing  8/3/2022 2230 by Claude Crafts, RN  Outcome: Progressing

## 2022-08-04 NOTE — PROGRESS NOTES
Verbal and written discharge instructions was given to the patient. Patient verbalizes understanding of d/c instructions including medications orders for home and possible side effects associated with them. Pt. instructed and verbalizes understanding to call the doctor listed if they should have any complications or worsening of symptoms and verbalizes understanding about follow-up appointments. D/C'd IV access, patient tolerated well, no signs of bleeding. Pt. Discharged to home with all belongings.  Awaiting w/c

## 2022-08-04 NOTE — PROGRESS NOTES
CLINICAL PHARMACY NOTE: MEDS TO BEDS    Total # of Prescriptions Filled: 4   The following medications were delivered to the patient:  Discharge Medication List as of 8/4/2022 12:04 PM        START taking these medications    Details   amoxicillin-clavulanate (AUGMENTIN) 500-125 MG per tablet Take 1 tablet by mouth in the morning and 1 tablet at noon and 1 tablet before bedtime. Do all this for 5 days. , Disp-15 tablet, R-0Normal         Atorvastatin 20mg  Amlodipine 5mg  Aspirin 81mg chew     Additional Documentation:  Medications delivered

## 2022-08-04 NOTE — PLAN OF CARE
Problem: Discharge Planning  Goal: Discharge to home or other facility with appropriate resources  8/3/2022 2230 by César Pearl RN  Outcome: Progressing  8/3/2022 1102 by Mitali Miner RN  Outcome: Progressing     Problem: Safety - Adult  Goal: Free from fall injury  8/3/2022 2230 by César Pearl RN  Outcome: Progressing  8/3/2022 1102 by Mitali Miner RN  Outcome: Progressing  Flowsheets (Taken 8/3/2022 1102)  Free From Fall Injury: Instruct family/caregiver on patient safety     Problem: Musculoskeletal - Adult  Goal: Return mobility to safest level of function  8/3/2022 2230 by César Pearl RN  Outcome: Progressing  8/3/2022 1102 by Mitali Miner RN  Outcome: Progressing  Goal: Return ADL status to a safe level of function  8/3/2022 2230 by César Pearl RN  Outcome: Progressing  8/3/2022 1102 by Mitali Miner RN  Outcome: Progressing     Problem: ABCDS Injury Assessment  Goal: Absence of physical injury  8/3/2022 2230 by César Pearl RN  Outcome: Progressing  8/3/2022 1102 by Mitali Miner RN  Outcome: Progressing  Flowsheets (Taken 8/3/2022 1102)  Absence of Physical Injury: Implement safety measures based on patient assessment

## 2022-08-04 NOTE — DISCHARGE SUMMARY
Hospital Medicine Discharge Summary    Patient ID: Milderd Santo      Patient's PCP: JAMMIE Galicia - CNP    Admit Date: 8/2/2022     Discharge Date:   08/04/22      Admitting Provider: Sandra Collins MD     Discharge Provider: Bruce Zhang MD     Discharge Diagnoses: Active Hospital Problems    Diagnosis     Respiratory failure with hypoxia [facility]) [J96.91]      Priority: Medium       The patient was seen and examined on day of discharge and this discharge summary is in conjunction with any daily progress note from day of discharge. Hospital Course:      58year old female patient with a past medical history of CAD, hypertension, hyperlipidemia. Patient was brought to ED following an aspiration event. He has a problem with sleeping at night. His daughter shared 1/2 tablet of seroquel with him. He was drinking vodka, shared a pint with a friend. He doesn't really remember what happened after that  but he was brought to the ED for respiratory failure after vomiting and aspirating with sats down to the 70s. Acute Hypoxic respiratory failure  aspiration pneumonia:   Treated with ceftriaxone and flagyl, switch to Augmentin to complete total 7 days. Weaned off oxygen. Back pain  Add Flexeril and lidocaine oatch     NICOLE (resolved)   DC IV fluids      Alcohol abuse:   - CIWA protocol  -did not score at all. CAD:  - aspirin 81 mg po daily     Hypertension:  - amlodipine 5 mg po daily     Dyslipidemia:  - atorvastatin 20 mg po daily     Tobacco use:   - nicotine patch ordered      Muscle spasms:  - tizanidine HELD     Supplement:  - folic acid po daily  - magnesium po daily         Physical Exam Performed:     BP (!) 170/92   Pulse 65   Temp 98.8 °F (37.1 °C) (Oral)   Resp 18   Ht 5' 7\" (1.702 m)   Wt 149 lb 7.6 oz (67.8 kg)   SpO2 95%   BMI 23.41 kg/m²       General appearance:  No apparent distress, appears stated age and cooperative.   HEENT:  Normal cephalic, atraumatic without obvious deformity. Pupils equal, round, and reactive to light. Extra ocular muscles intact. Conjunctivae/corneas clear. Neck: Supple, with full range of motion. No jugular venous distention. Trachea midline. Respiratory:  Normal respiratory effort. Clear to auscultation, bilaterally without Rales/Wheezes/Rhonchi. Cardiovascular:  Regular rate and rhythm with normal S1/S2 without murmurs, rubs or gallops. Abdomen: Soft, non-tender, non-distended with normal bowel sounds. Musculoskeletal:  No clubbing, cyanosis or edema bilaterally. Full range of motion without deformity. Skin: Skin color, texture, turgor normal.  No rashes or lesions. Neurologic:  Neurovascularly intact without any focal sensory/motor deficits. Cranial nerves: II-XII intact, grossly non-focal.  Psychiatric:  Alert and oriented, thought content appropriate, normal insight  Capillary Refill: Brisk,< 3 seconds   Peripheral Pulses: +2 palpable, equal bilaterally       Labs: For convenience and continuity at follow-up the following most recent labs are provided:      CBC:    Lab Results   Component Value Date/Time    WBC 5.7 08/04/2022 09:03 AM    HGB 12.5 08/04/2022 09:03 AM    HCT 36.7 08/04/2022 09:03 AM     08/04/2022 09:03 AM       Renal:    Lab Results   Component Value Date/Time     08/04/2022 09:03 AM    K 3.4 08/04/2022 09:03 AM    K 3.4 08/03/2022 05:28 AM     08/04/2022 09:03 AM    CO2 22 08/04/2022 09:03 AM    BUN 5 08/04/2022 09:03 AM    CREATININE 0.7 08/04/2022 09:03 AM    CALCIUM 9.4 08/04/2022 09:03 AM         Significant Diagnostic Studies    Radiology:   XR CHEST PORTABLE   Final Result   1. Mildly prominent peribronchial markings seen at the lung bases, right   greater than left. This can be seen in the setting of bronchiolitis which   could have a similar appearance to aspiration as well. 2. No focal consolidation or other acute process.                 Consults: [id number].

## 2022-08-06 LAB
BLOOD CULTURE, ROUTINE: NORMAL
BLOOD CULTURE, ROUTINE: NORMAL

## 2022-08-08 ENCOUNTER — TELEPHONE (OUTPATIENT)
Dept: FAMILY MEDICINE CLINIC | Age: 62
End: 2022-08-08

## 2022-08-09 ENCOUNTER — TELEPHONE (OUTPATIENT)
Dept: CASE MANAGEMENT | Age: 62
End: 2022-08-09

## 2022-08-09 NOTE — TELEPHONE ENCOUNTER
Spoke to patient and he is taking meds as directed. He doesn't have an eye doctor but will look into one and possibly discuss at upcoming appt. 207

## 2022-08-09 NOTE — TELEPHONE ENCOUNTER
[facility] Trans[facility] Initial Follow Up Call    Outreach made within 2 business days of discharge: Yes    Patient: Elisabeth Hayward. Patient : 1960   MRN: <[id number]>  Reason for Admission: There are no discharge diagnoses documented for the most recent discharge. Discharge Date: 22       Spoke with: Patient    Discharge department/facility:[facility]    TCM Interactive Patient Contact:  Was patient able to fill all prescriptions: Yes  Was patient instructed to bring all medications to the follow-up visit: Yes  Is patient taking all medications as directed in the discharge summary? No  Does patient understand their discharge instructions: Yes  Does patient have questions or concerns that need addressed prior to 7-14 day follow up office visit: yes - lt eye is blurry    Scheduled appointment with PCP within 7-14 days    Follow Up  No future appointments.     Theresa Boyle MA

## 2022-08-09 NOTE — TELEPHONE ENCOUNTER
Patient should make sure that he is taking all medications that he was instructed to take. For left eye visual problems, would like for him to make an appt with his eye doctor for evaluation.

## 2022-08-12 ENCOUNTER — OFFICE VISIT (OUTPATIENT)
Dept: FAMILY MEDICINE CLINIC | Age: 62
End: 2022-08-12
Payer: COMMERCIAL

## 2022-08-12 VITALS
BODY MASS INDEX: 23.54 KG/M2 | SYSTOLIC BLOOD PRESSURE: 128 MMHG | WEIGHT: 150 LBS | OXYGEN SATURATION: 98 % | HEART RATE: 85 BPM | RESPIRATION RATE: 16 BRPM | DIASTOLIC BLOOD PRESSURE: 76 MMHG | HEIGHT: 67 IN

## 2022-08-12 DIAGNOSIS — F10.10 ALCOHOL ABUSE: ICD-10-CM

## 2022-08-12 DIAGNOSIS — R74.8 ELEVATED LIVER ENZYMES: ICD-10-CM

## 2022-08-12 DIAGNOSIS — Z09 HOSPITAL DISCHARGE FOLLOW-UP: ICD-10-CM

## 2022-08-12 DIAGNOSIS — E53.8 FOLATE DEFICIENCY: ICD-10-CM

## 2022-08-12 DIAGNOSIS — J69.0 ASPIRATION PNEUMONIA OF BOTH LUNGS, UNSPECIFIED ASPIRATION PNEUMONIA TYPE, UNSPECIFIED PART OF LUNG (HCC): ICD-10-CM

## 2022-08-12 DIAGNOSIS — I10 ESSENTIAL HYPERTENSION: ICD-10-CM

## 2022-08-12 DIAGNOSIS — Q21.12 PFO (PATENT FORAMEN OVALE): ICD-10-CM

## 2022-08-12 DIAGNOSIS — I25.10 CORONARY ARTERY DISEASE INVOLVING NATIVE CORONARY ARTERY OF NATIVE HEART WITHOUT ANGINA PECTORIS: ICD-10-CM

## 2022-08-12 DIAGNOSIS — E83.42 HYPOMAGNESEMIA: ICD-10-CM

## 2022-08-12 DIAGNOSIS — E04.1 THYROID NODULE: ICD-10-CM

## 2022-08-12 LAB
A/G RATIO: 1.9 (ref 1.1–2.2)
ALBUMIN SERPL-MCNC: 4.9 G/DL (ref 3.4–5)
ALP BLD-CCNC: 129 U/L (ref 40–129)
ALT SERPL-CCNC: 62 U/L (ref 10–40)
ANION GAP SERPL CALCULATED.3IONS-SCNC: 18 MMOL/L (ref 3–16)
AST SERPL-CCNC: 77 U/L (ref 15–37)
BASOPHILS ABSOLUTE: 0.1 K/UL (ref 0–0.2)
BASOPHILS RELATIVE PERCENT: 1.4 %
BILIRUB SERPL-MCNC: 0.6 MG/DL (ref 0–1)
BUN BLDV-MCNC: 9 MG/DL (ref 7–20)
CALCIUM SERPL-MCNC: 9.6 MG/DL (ref 8.3–10.6)
CHLORIDE BLD-SCNC: 103 MMOL/L (ref 99–110)
CO2: 23 MMOL/L (ref 21–32)
CREAT SERPL-MCNC: 0.7 MG/DL (ref 0.8–1.3)
EOSINOPHILS ABSOLUTE: 0.2 K/UL (ref 0–0.6)
EOSINOPHILS RELATIVE PERCENT: 5.4 %
FOLATE: 8.5 NG/ML (ref 4.78–24.2)
GFR AFRICAN AMERICAN: >60
GFR NON-AFRICAN AMERICAN: >60
GLUCOSE BLD-MCNC: 78 MG/DL (ref 70–99)
HCT VFR BLD CALC: 42.4 % (ref 40.5–52.5)
HEMOGLOBIN: 14.4 G/DL (ref 13.5–17.5)
LYMPHOCYTES ABSOLUTE: 1.8 K/UL (ref 1–5.1)
LYMPHOCYTES RELATIVE PERCENT: 44.7 %
MCH RBC QN AUTO: 34.6 PG (ref 26–34)
MCHC RBC AUTO-ENTMCNC: 33.9 G/DL (ref 31–36)
MCV RBC AUTO: 102.1 FL (ref 80–100)
MONOCYTES ABSOLUTE: 0.5 K/UL (ref 0–1.3)
MONOCYTES RELATIVE PERCENT: 13.3 %
NEUTROPHILS ABSOLUTE: 1.4 K/UL (ref 1.7–7.7)
NEUTROPHILS RELATIVE PERCENT: 35.2 %
PDW BLD-RTO: 14.7 % (ref 12.4–15.4)
PLATELET # BLD: 392 K/UL (ref 135–450)
PMV BLD AUTO: 7.9 FL (ref 5–10.5)
POTASSIUM SERPL-SCNC: 4.3 MMOL/L (ref 3.5–5.1)
RBC # BLD: 4.15 M/UL (ref 4.2–5.9)
SODIUM BLD-SCNC: 144 MMOL/L (ref 136–145)
TOTAL PROTEIN: 7.5 G/DL (ref 6.4–8.2)
VITAMIN B-12: 587 PG/ML (ref 211–911)
WBC # BLD: 4 K/UL (ref 4–11)

## 2022-08-12 PROCEDURE — 1111F DSCHRG MED/CURRENT MED MERGE: CPT | Performed by: NURSE PRACTITIONER

## 2022-08-12 PROCEDURE — 99214 OFFICE O/P EST MOD 30 MIN: CPT | Performed by: NURSE PRACTITIONER

## 2022-08-12 RX ORDER — CALCIUM CARBONATE 300MG(750)
1 TABLET,CHEWABLE ORAL DAILY
Qty: 30 TABLET | Refills: 0 | Status: SHIPPED | OUTPATIENT
Start: 2022-08-12 | End: 2022-09-27 | Stop reason: SDUPTHER

## 2022-08-12 RX ORDER — AMOXICILLIN AND CLAVULANATE POTASSIUM 875; 125 MG/1; MG/1
TABLET, FILM COATED ORAL
COMMUNITY
Start: 2022-08-04

## 2022-08-12 RX ORDER — FOLIC ACID 1 MG/1
1 TABLET ORAL DAILY
Qty: 90 TABLET | Refills: 1 | Status: SHIPPED | OUTPATIENT
Start: 2022-08-12 | End: 2022-09-27 | Stop reason: SDUPTHER

## 2022-08-12 ASSESSMENT — PATIENT HEALTH QUESTIONNAIRE - PHQ9
1. LITTLE INTEREST OR PLEASURE IN DOING THINGS: 0
SUM OF ALL RESPONSES TO PHQ QUESTIONS 1-9: 0
SUM OF ALL RESPONSES TO PHQ9 QUESTIONS 1 & 2: 0
SUM OF ALL RESPONSES TO PHQ QUESTIONS 1-9: 0
2. FEELING DOWN, DEPRESSED OR HOPELESS: 0

## 2022-08-12 ASSESSMENT — ENCOUNTER SYMPTOMS
SHORTNESS OF BREATH: 0
ABDOMINAL PAIN: 0
COUGH: 0

## 2022-08-12 NOTE — PROGRESS NOTES
BG- [address], [address] [facility]  01/22/22 7055 Post-Discharge Transitional Care Follow Up      Damaso Honeycutt Sr. YOB: 1960    Date of Office Visit:  8/12/2022  Date of Hospital Admission: 8/2/22  Date of Hospital Discharge: 8/4/22  Readmission Risk Score (high >=14%. Medium >=10%):Readmission Risk Score: 8.7      Care management risk score Rising risk (score 2-5) and Complex Care (Scores >=6): No Risk Score On File     Non face to face  following discharge, date last encounter closed (first attempt may have been earlier): 08/08/2022     Call initiated 2 business days of discharge: Yes     Hospital discharge follow-up  -     [place] DISCHARGE MEDS RECONCILED W/ CURRENT OUTPATIENT MED LIST    Aspiration pneumonia of both lungs, unspecified aspiration pneumonia type, unspecified part of lung ([address]  -     XR CHEST (2 VW); Future  -     CBC with Auto Differential; Future  Repeat chest xray in a month  Feeling better  Should complete augmentin as prescribed  Smoking cessation discussed and needed    Essential hypertension  -     Comprehensive Metabolic Panel; Future  Controlled on amlodipine 5 mg daily     Coronary artery disease involving native coronary artery of native heart without angina pectoris  -     Zenda Koyanagi, MD, Cardiology (Structural), [facility]  Stable  On ASA, statin     Alcohol abuse  -     folic acid (FOLVITE) 1 MG tablet; Take 1 tablet by mouth in the morning., Disp-90 tablet, R-1Normal  -     Vitamin B12 & Folate; Future  Discussed the importance of decreasing and stopping use, but he was not interested at this time. Folate deficiency  -     folic acid (FOLVITE) 1 MG tablet; Take 1 tablet by mouth in the morning., Disp-90 tablet, R-1Normal    Hypomagnesemia  -     Magnesium 400 MG TABS; Take 1 tablet by mouth daily, Disp-30 tablet, R-0Normal  Restart supplement and will repeat lab next visit    Thyroid nodule  -     Mayda Fletcher, Clarice Matthew MD, Endocrinology, [facility]  Chronic issue.  Still has not had f/u with endocrinology or biopsy. Advised again to schedule appt for evaluation     PFO (patent foramen ovale)  -     Jaxon Tobin MD, Cardiology (Structural), [facility]  Follow with Dr. Claudio Vickers. Advised that he needs to make f/u appt    Elevated liver enzymes  -     Comprehensive Metabolic Panel; Future  Likely due to alcohol use. Has seen GI Dr. Gabi Terrell in the past, but has not had f/u. Advised to schedule f/u visit. Return in about 4 weeks (around 9/9/2022), or if symptoms worsen or fail to improve, for chronic conditions. Subjective:   HPI  Patient is here for hospital f/u from 8/2- 8/4. He reports that he has problem with sleeping at night. His daughter shared 1/2 tablet of seroquel with him. He was drinking vodka. He doesn't really remember what happened after that  but he was brought to the ED for respiratory failure after vomiting and aspirating with sats down to the 70s. Acute hypoxic respiratory failure aspiration pneumonia- treated with ceftriaxone and flagyl and then switched to augmentin for 7 days. Alcohol abuse- He reports that he has cut down on alcohol use. Drinking 1 24 oz beer 3 times per week. HTN- prior to hospitalization was not taking amlodipine as he was out of his medication and did not get a refill    HLD- prior to hospitalizatino was not taking atorvastatin as he was out of his medication and dit not get a refill. CAD- restarted ASA 81 mg daily    History of PFO- follows with Dr. Yanira Katz. Has not seen recently. Has not been taking folic acid or magnesium supplement. History of deficiency. Inpatient course: Discharge summary reviewed- see chart.       Patient Active Problem List   Diagnosis    Thyroid goiter    Chest pain    SOB (shortness of breath)    Essential hypertension    Elevated liver enzymes    Alcohol abuse    Folate deficiency    Coronary artery disease involving native coronary artery of native heart without angina pectoris Other emphysema ([address]- seen on CT scan     PFO (patent foramen ovale)    Respiratory failure with hypoxia ([address]       Medications listed as ordered at the time of discharge from hospital     Medication List            Accurate as of August 12, 2022 10:36 AM. If you have any questions, ask your nurse or doctor. CONTINUE taking these medications      amLODIPine 5 MG tablet  Commonly known as: NORVASC  TAKE ONE TABLET BY MOUTH DAILY     amoxicillin-clavulanate 875-125 MG per tablet  Commonly known as: AUGMENTIN     aspirin 81 MG chewable tablet  Take 1 tablet by mouth in the morning. atorvastatin 20 MG tablet  Commonly known as: LIPITOR  Take 1 tablet by mouth nightly     budesonide-formoterol 160-4.5 MCG/ACT Aero  Commonly known as: Symbicort  Inhale 2 puffs into the lungs 2 times daily For long-term control of COPD symptoms. STOP taking these medications      folic acid 1 MG tablet  Commonly known as: Lorrane Cordon by: JAMMIE Vivar CNP     Magnesium 400 MG Tabs  Stopped by: JAMMIE Vivar CNP               Medications marked \"taking\" at this time  Outpatient Medications Marked as Taking for the 8/12/22 encounter (Office Visit) with JAMMIE Brandt CNP   Medication Sig Dispense Refill    aspirin 81 MG chewable tablet Take 1 tablet by mouth in the morning. 30 tablet 3    atorvastatin (LIPITOR) 20 MG tablet Take 1 tablet by mouth nightly 30 tablet 3    amLODIPine (NORVASC) 5 MG tablet TAKE ONE TABLET BY MOUTH DAILY 30 tablet 1    budesonide-formoterol (SYMBICORT) 160-4.5 MCG/ACT AERO Inhale 2 puffs into the lungs 2 times daily For long-term control of COPD symptoms. 1 each 2        Medications patient taking as of now reconciled against medications ordered at time of hospital discharge: Yes    Review of Systems   Constitutional:  Negative for activity change and fever. Respiratory:  Negative for cough and shortness of breath. Cardiovascular:  Negative for chest pain. Gastrointestinal:  Negative for abdominal pain. Neurological:  Negative for dizziness and headaches. Objective:    /76 (Site: Right Upper Arm, Position: Sitting, Cuff Size: Large Adult)   Pulse 85   Resp 16   Ht 5' 7\" (1.702 m)   Wt 150 lb (68 kg)   SpO2 98%   BMI 23.49 kg/m²   Physical Exam  Vitals and nursing note reviewed. Constitutional:       General: He is not in acute distress. Appearance: He is well-developed. HENT:      Head: Normocephalic and atraumatic. Neck:      Thyroid: Thyromegaly present. Cardiovascular:      Rate and Rhythm: Normal rate and regular rhythm. Heart sounds: Normal heart sounds. No murmur heard. No friction rub. No gallop. Pulmonary:      Effort: Pulmonary effort is normal. No respiratory distress. Breath sounds: Normal breath sounds. Musculoskeletal:      Cervical back: Neck supple. Skin:     General: Skin is warm and dry. Neurological:      Mental Status: He is alert and oriented to person, place, and time. Psychiatric:         Behavior: Behavior normal.         Thought Content: Thought content normal.         Judgment: Judgment normal.       An electronic signature was used to authenticate this note.   --[facility], APRN - CNP

## 2022-09-01 ENCOUNTER — OFFICE VISIT (OUTPATIENT)
Dept: CARDIOLOGY CLINIC | Age: 62
End: 2022-09-01
Payer: COMMERCIAL

## 2022-09-01 ENCOUNTER — HOSPITAL ENCOUNTER (OUTPATIENT)
Dept: GENERAL RADIOLOGY | Age: 62
Discharge: HOME | End: 2022-09-01
Payer: COMMERCIAL

## 2022-09-01 ENCOUNTER — HOSPITAL ENCOUNTER (OUTPATIENT)
Age: 62
Discharge: HOME | End: 2022-09-01
Payer: COMMERCIAL

## 2022-09-01 VITALS
HEART RATE: 93 BPM | HEIGHT: 67 IN | OXYGEN SATURATION: 97 % | DIASTOLIC BLOOD PRESSURE: 72 MMHG | SYSTOLIC BLOOD PRESSURE: 124 MMHG | BODY MASS INDEX: 24.11 KG/M2 | WEIGHT: 153.6 LBS

## 2022-09-01 DIAGNOSIS — I25.10 CORONARY ARTERY DISEASE INVOLVING NATIVE CORONARY ARTERY OF NATIVE HEART WITHOUT ANGINA PECTORIS: ICD-10-CM

## 2022-09-01 DIAGNOSIS — R06.09 DOE (DYSPNEA ON EXERTION): ICD-10-CM

## 2022-09-01 DIAGNOSIS — E78.2 MIXED HYPERLIPIDEMIA: ICD-10-CM

## 2022-09-01 DIAGNOSIS — Q21.12 PFO (PATENT FORAMEN OVALE): Primary | ICD-10-CM

## 2022-09-01 DIAGNOSIS — J69.0 ASPIRATION PNEUMONIA OF BOTH LUNGS, UNSPECIFIED ASPIRATION PNEUMONIA TYPE, UNSPECIFIED PART OF LUNG (HCC): ICD-10-CM

## 2022-09-01 DIAGNOSIS — I10 ESSENTIAL HYPERTENSION: ICD-10-CM

## 2022-09-01 PROCEDURE — 1111F DSCHRG MED/CURRENT MED MERGE: CPT | Performed by: INTERNAL MEDICINE

## 2022-09-01 PROCEDURE — 3017F COLORECTAL CA SCREEN DOC REV: CPT | Performed by: INTERNAL MEDICINE

## 2022-09-01 PROCEDURE — 71046 X-RAY EXAM CHEST 2 VIEWS: CPT

## 2022-09-01 PROCEDURE — 93000 ELECTROCARDIOGRAM COMPLETE: CPT | Performed by: INTERNAL MEDICINE

## 2022-09-01 PROCEDURE — 99214 OFFICE O/P EST MOD 30 MIN: CPT | Performed by: INTERNAL MEDICINE

## 2022-09-01 PROCEDURE — G8420 CALC BMI NORM PARAMETERS: HCPCS | Performed by: INTERNAL MEDICINE

## 2022-09-01 PROCEDURE — G8428 CUR MEDS NOT DOCUMENT: HCPCS | Performed by: INTERNAL MEDICINE

## 2022-09-01 PROCEDURE — 4004F PT TOBACCO SCREEN RCVD TLK: CPT | Performed by: INTERNAL MEDICINE

## 2022-09-01 NOTE — PROGRESS NOTES
[facility]  Cardiology Consult    Anastacia Forrester Sr.  1960 September 1, 2022      Reason for Referral: PFO/ASD    CC: \"I am short of breath all the time. \"      Subjective:     History of Present Illness:    Winnie Bustillos is a 58 y.o. patient with a PMH significant for hypertension, coronary artery disease, smoking addiction, and hyperlipidemia. Today, he is here to discuss the findings on his echocardiogram. He says that his shortness of breath is not getting any better. It is limiting his daily activity. He is having a hard time walking at home due to the increased shortness of breath. He is also concerned about a bruise on his left arm. Patient denies exertional chest pain/pressure, dyspnea at rest,  PND, orthopnea, palpitations, lightheadedness, weight changes, changes in LE edema, and syncope. Past Medical History:   has a past medical history of CAD (coronary artery disease), Hyperlipidemia, and Hypertension. Surgical History:   has no past surgical history on file. Social History:   reports that he has been smoking cigarettes. He started smoking about 47 years ago. He has a 23.00 pack-year smoking history. He has never used smokeless tobacco. He reports current alcohol use. He reports current drug use. Drug: Marijuana Champ Cue). Family History:  family history includes Diabetes in his mother; Liver Disease in his brother and father. Home Medications:  Were reviewed and are listed in nursing record and/or below  Prior to Admission medications    Medication Sig Start Date End Date Taking? Authorizing Provider   amoxicillin-clavulanate (AUGMENTIN) 875-125 MG per tablet  8/4/22  Yes Historical Provider, MD   Magnesium 400 MG TABS Take 1 tablet by mouth daily 8/12/22  Yes JAMMIE Monroe CNP   folic acid (FOLVITE) 1 MG tablet Take 1 tablet by mouth in the morning.  8/12/22  Yes JAMMIE Monroe CNP   aspirin 81 MG chewable tablet Take 1 tablet by mouth in the morning. 8/4/22  Yes Thais Gayle MD   atorvastatin (LIPITOR) 20 MG tablet Take 1 tablet by mouth nightly 8/4/22  Yes Thais Gayle MD   amLODIPine (NORVASC) 5 MG tablet TAKE ONE TABLET BY MOUTH DAILY 8/4/22  Yes Thais Gayle MD   budesonide-formoterol (SYMBICORT) 160-4.5 MCG/ACT AERO Inhale 2 puffs into the lungs 2 times daily For long-term control of COPD symptoms. 9/27/21  Yes JAMMIE Neville CNP        CURRENT Medications:  No current facility-administered medications for this visit. Allergies:  Patient has no known allergies. Review of Systems: SEE HPI   Constitutional: no unanticipated weight loss. There's been no change in energy level, sleep pattern, or activity level. No fevers, chills. Eyes: No visual changes or diplopia. No scleral icterus. ENT: No Headaches, hearing loss or vertigo. No mouth sores or sore throat. Cardiovascular: No Chest pain, tightness or discomfort. No Shortness of breath. No Dyspnea on exertion, Orthopnea, Paroxysmal nocturnal dyspnea or breathlessness at rest.  No Palpitations. No Syncope ('blackouts', 'faints', 'collapse') or dizziness. Respiratory: No cough or wheezing, no sputum production. No hematemesis. Gastrointestinal: No abdominal pain, appetite loss, blood in stools. No change in bowel or bladder habits. Genitourinary: No dysuria, trouble voiding, or hematuria. Musculoskeletal:  No gait disturbance, no joint complaints. Integumentary: No rash or pruritis. Neurological: No headache, diplopia, change in muscle strength, numbness or tingling. Psychiatric: No anxiety or depression. Endocrine: No temperature intolerance. No excessive thirst, fluid intake, or urination. No tremor. Hematologic/Lymphatic: No abnormal bruising or bleeding, blood clots or swollen lymph nodes. Allergic/Immunologic: No nasal congestion or hives.       Objective:     PHYSICAL EXAM:      Vitals:    09/01/22 1557   BP: 124/72 Pulse: 93   SpO2: 97%    Weight: 153 lb 9.6 oz (69.7 kg)       General Appearance:  Alert, cooperative, no distress, appears stated age. Head:  Normocephalic, without obvious abnormality, atraumatic. Eyes:  Pupils equal and round. No scleral icterus. Mouth: Moist mucosa, no pharyngeal erythema. Nose: Nares normal. No drainage or sinus tenderness. Neck: Supple, symmetrical, trachea midline. No adenopathy. No tenderness/mass/nodules. No carotid bruit or elevated JVD. Lungs:   Respiratory Effort: Normal   Auscultation: Clear to auscultation bilaterally, respirations unlabored. No wheeze, rales   Chest Wall:  No tenderness or deformity. Cardiovascular:    Pulses  Palpation: normal   Ascultation: Regular rate, S1/ S2 normal. No murmur, rub, or gallop. 2+ radial and pedal pulses, symmetric  Carotid  Femoral   Abdomen and Gastrointestinal:   Soft, non-tender, bowel sounds active. Liver and Spleen  Masses   Musculoskeletal: No muscle wasting  Back  Gait   Extremities: Extremities normal, atraumatic. No cyanosis or edema. No cyanosis clubbing       Skin: Inspection and palpation performed, no rashes or lesions. Pysch: Normal mood and affect.  Alert and oriented to time place person   Neurologic: Normal gross motor and sensory exam.       Labs     All labs have been reviewed    Lab Results   Component Value Date/Time    WBC 4.0 08/12/2022 11:39 AM    RBC 4.15 08/12/2022 11:39 AM    HGB 14.4 08/12/2022 11:39 AM    HCT 42.4 08/12/2022 11:39 AM    .1 08/12/2022 11:39 AM    RDW 14.7 08/12/2022 11:39 AM     08/12/2022 11:39 AM     Lab Results   Component Value Date/Time     08/12/2022 11:39 AM    K 4.3 08/12/2022 11:39 AM    K 3.4 08/03/2022 05:28 AM     08/12/2022 11:39 AM    CO2 23 08/12/2022 11:39 AM    BUN 9 08/12/2022 11:39 AM    CREATININE 0.7 08/12/2022 11:39 AM    GFRAA >60 08/12/2022 11:39 AM    AGRATIO 1.9 08/12/2022 11:39 AM    LABGLOM >60 08/12/2022 11:39 AM    GLUCOSE 78 08/12/2022 11:39 AM    PROT 7.5 08/12/2022 11:39 AM    CALCIUM 9.6 08/12/2022 11:39 AM    BILITOT 0.6 08/12/2022 11:39 AM    ALKPHOS 129 08/12/2022 11:39 AM    AST 77 08/12/2022 11:39 AM    ALT 62 08/12/2022 11:39 AM     No results found for: PTINR  Lab Results   Component Value Date    LABA1C 5.4 09/27/2021     Lab Results   Component Value Date    CKTOTAL 275 06/05/2020    TROPONINI <0.01 08/02/2022       Cardiac, Vascular and Imaging Data all Personally Reviewed in Detail by Myself      EKG:     Echocardiogram:   ECHO 9/2/2021  Normal LV size,wall thickness and motion. EF   60%. Normal diastolic  function. Left atrium is of normal size. Normal right ventricular size and function. Trivial mitral and Mild tricuspid regurgitation. A bubble study was performed and shows evidence of right to left shunting  consistent with a patent foramen ovale or atrial septal defect. Stress Test:   Stress test 6/5/2020   Summary   Pharmacological Stress/MPI Results:      1. Technically a satisfactory study. 2. Normal pharmacological stress portion of the study. 3. No evidence of Ischemia by Myocardial Perfusion Imaging. 4. Gated Study shows normal LV size and Systolic function; EF is 67 %. Cath:  Cardiac cath 8/7/2020  IMPRESSION:  1. Moderate to heavy calcification of the left main trunk which is  widely patent. 2.  Mild to moderate calcification of the proximal and the mid LAD with  patent LAD and 30% diagonal stenosis. 3.  Patent circumflex artery and its branches. 4.  A 30% stenosis of the distal right coronary artery with evidence of  mild calcification. 5.  Preserved left ventricular systolic function with normal left heart  hemodynamics. In view of the above findings, we will consider the patient's pain to be  noncardiac in origin and look for other etiologies for the patient's  chest pain but continue with risk factor modification.     Other imaging:   Cardiac MRI 12/2/2021  Overall normal RV and LV systolic function. There appears to be a small PFO. -Normal left ventricular size and systolic function with a calculated ejection fraction of 57% by Dominguez's method.   -Normal LV global longitudinal strain (GLS) -18%   -Normal right ventricular size and systolic function with a calculated ejection fraction of 55% by Dominguez's method.   -There appears to be a small PFO   -No significant intracardiac shunt. Calculated Qp:Qs:1.04 (Phase contrast velocity encoding Ao/Pa)   -Normal myocardial resting perfusion imaging.   -On delayed enhancement imaging, there is no abnormal hyperenhancement to suggest myocardial scar/inflammation/infiltration. Assessment and Plan     PFO shunt seen intrathoracic echo. Small PFO noted on cardiac MRI. No indication to close at this time, no evidence of stroke. Coronary artery disease  Increased dyspnea and intermittent chest pain. Continue Asa and statin therapy. Will order exercise myoview stress test and echocardiogram to assess further. Hypertension  Controlled. Continue current treatment. Hyperlipidemia  Continue statin therapy. Follow up in  6 months. Thank you for allowing us to participate in the care of [address] [place] [address],[address] .  Please do not hesitate to contact me if you have any questions. Tal Quintero MD, MPH    [facility] [place], [address] [facility]  Ph: [phone]  Fax: [phone]    This note was scribed in the presence of Dr Eyal Cotter, by Yamile Pabon RN  Physician Attestation:  The scribes documentation has been prepared under my direction and personally reviewed by me in its entirety. I confirm that the note above accurately reflects all work, treatment, procedures, and medical decision making performed by me.

## 2022-09-15 ENCOUNTER — HOSPITAL ENCOUNTER (EMERGENCY)
Age: 62
Discharge: HOME | End: 2022-09-15
Attending: EMERGENCY MEDICINE
Payer: COMMERCIAL

## 2022-09-15 ENCOUNTER — APPOINTMENT (OUTPATIENT)
Dept: GENERAL RADIOLOGY | Age: 62
End: 2022-09-15
Payer: COMMERCIAL

## 2022-09-15 VITALS
OXYGEN SATURATION: 96 % | HEIGHT: 67 IN | HEART RATE: 80 BPM | SYSTOLIC BLOOD PRESSURE: 151 MMHG | DIASTOLIC BLOOD PRESSURE: 87 MMHG | BODY MASS INDEX: 23.91 KG/M2 | RESPIRATION RATE: 18 BRPM | WEIGHT: 152.34 LBS | TEMPERATURE: 100.4 F

## 2022-09-15 DIAGNOSIS — U07.1 COVID-19: Primary | ICD-10-CM

## 2022-09-15 DIAGNOSIS — R11.2 NON-INTRACTABLE VOMITING WITH NAUSEA, UNSPECIFIED VOMITING TYPE: ICD-10-CM

## 2022-09-15 DIAGNOSIS — R19.7 DIARRHEA, UNSPECIFIED TYPE: ICD-10-CM

## 2022-09-15 LAB
A/G RATIO: 2 (ref 1.1–2.2)
ALBUMIN SERPL-MCNC: 4.9 G/DL (ref 3.4–5)
ALP BLD-CCNC: 136 U/L (ref 40–129)
ALT SERPL-CCNC: 41 U/L (ref 10–40)
ANION GAP SERPL CALCULATED.3IONS-SCNC: 13 MMOL/L (ref 3–16)
AST SERPL-CCNC: 79 U/L (ref 15–37)
BASOPHILS ABSOLUTE: 0 K/UL (ref 0–0.2)
BASOPHILS RELATIVE PERCENT: 1 %
BILIRUB SERPL-MCNC: 1.4 MG/DL (ref 0–1)
BUN BLDV-MCNC: 8 MG/DL (ref 7–20)
CALCIUM SERPL-MCNC: 9.6 MG/DL (ref 8.3–10.6)
CHLORIDE BLD-SCNC: 96 MMOL/L (ref 99–110)
CO2: 23 MMOL/L (ref 21–32)
CREAT SERPL-MCNC: 0.6 MG/DL (ref 0.8–1.3)
EKG ATRIAL RATE: 100 BPM
EKG DIAGNOSIS: NORMAL
EKG P AXIS: 82 DEGREES
EKG P-R INTERVAL: 134 MS
EKG Q-T INTERVAL: 354 MS
EKG QRS DURATION: 90 MS
EKG QTC CALCULATION (BAZETT): 456 MS
EKG R AXIS: 47 DEGREES
EKG T AXIS: 73 DEGREES
EKG VENTRICULAR RATE: 100 BPM
EOSINOPHILS ABSOLUTE: 0 K/UL (ref 0–0.6)
EOSINOPHILS RELATIVE PERCENT: 0.5 %
GFR AFRICAN AMERICAN: >60
GFR NON-AFRICAN AMERICAN: >60
GLUCOSE BLD-MCNC: 136 MG/DL (ref 70–99)
HCT VFR BLD CALC: 37.9 % (ref 40.5–52.5)
HEMOGLOBIN: 13.3 G/DL (ref 13.5–17.5)
LACTIC ACID: 1.7 MMOL/L (ref 0.4–2)
LYMPHOCYTES ABSOLUTE: 1.2 K/UL (ref 1–5.1)
LYMPHOCYTES RELATIVE PERCENT: 25.5 %
MCH RBC QN AUTO: 34.5 PG (ref 26–34)
MCHC RBC AUTO-ENTMCNC: 35.2 G/DL (ref 31–36)
MCV RBC AUTO: 97.9 FL (ref 80–100)
MONOCYTES ABSOLUTE: 0.6 K/UL (ref 0–1.3)
MONOCYTES RELATIVE PERCENT: 13.1 %
NEUTROPHILS ABSOLUTE: 2.7 K/UL (ref 1.7–7.7)
NEUTROPHILS RELATIVE PERCENT: 59.9 %
PDW BLD-RTO: 14.1 % (ref 12.4–15.4)
PLATELET # BLD: 222 K/UL (ref 135–450)
PMV BLD AUTO: 7.3 FL (ref 5–10.5)
POTASSIUM SERPL-SCNC: 3.7 MMOL/L (ref 3.5–5.1)
PROCALCITONIN: 0.14 NG/ML (ref 0–0.15)
RBC # BLD: 3.87 M/UL (ref 4.2–5.9)
SARS-COV-2, NAAT: NOT DETECTED
SODIUM BLD-SCNC: 132 MMOL/L (ref 136–145)
TOTAL PROTEIN: 7.3 G/DL (ref 6.4–8.2)
TROPONIN: <0.01 NG/ML
WBC # BLD: 4.6 K/UL (ref 4–11)

## 2022-09-15 PROCEDURE — 83605 ASSAY OF LACTIC ACID: CPT

## 2022-09-15 PROCEDURE — 84484 ASSAY OF TROPONIN QUANT: CPT

## 2022-09-15 PROCEDURE — 96374 THER/PROPH/DIAG INJ IV PUSH: CPT

## 2022-09-15 PROCEDURE — 71045 X-RAY EXAM CHEST 1 VIEW: CPT

## 2022-09-15 PROCEDURE — 93010 ELECTROCARDIOGRAM REPORT: CPT | Performed by: INTERNAL MEDICINE

## 2022-09-15 PROCEDURE — 84145 PROCALCITONIN (PCT): CPT

## 2022-09-15 PROCEDURE — 93005 ELECTROCARDIOGRAM TRACING: CPT | Performed by: EMERGENCY MEDICINE

## 2022-09-15 PROCEDURE — 85025 COMPLETE CBC W/AUTO DIFF WBC: CPT

## 2022-09-15 PROCEDURE — 2580000003 HC RX 258: Performed by: EMERGENCY MEDICINE

## 2022-09-15 PROCEDURE — 99285 EMERGENCY DEPT VISIT HI MDM: CPT

## 2022-09-15 PROCEDURE — 80053 COMPREHEN METABOLIC PANEL: CPT

## 2022-09-15 PROCEDURE — 6360000002 HC RX W HCPCS: Performed by: EMERGENCY MEDICINE

## 2022-09-15 PROCEDURE — 87635 SARS-COV-2 COVID-19 AMP PRB: CPT

## 2022-09-15 RX ORDER — ONDANSETRON 2 MG/ML
4 INJECTION INTRAMUSCULAR; INTRAVENOUS ONCE
Status: COMPLETED | OUTPATIENT
Start: 2022-09-15 | End: 2022-09-15

## 2022-09-15 RX ORDER — ONDANSETRON 4 MG/1
4 TABLET, ORALLY DISINTEGRATING ORAL EVERY 8 HOURS PRN
Qty: 20 TABLET | Refills: 0 | Status: SHIPPED | OUTPATIENT
Start: 2022-09-15

## 2022-09-15 RX ORDER — 0.9 % SODIUM CHLORIDE 0.9 %
500 INTRAVENOUS SOLUTION INTRAVENOUS ONCE
Status: COMPLETED | OUTPATIENT
Start: 2022-09-15 | End: 2022-09-15

## 2022-09-15 RX ADMIN — ONDANSETRON 4 MG: 2 INJECTION INTRAMUSCULAR; INTRAVENOUS at 14:36

## 2022-09-15 RX ADMIN — SODIUM CHLORIDE 500 ML: 9 INJECTION, SOLUTION INTRAVENOUS at 14:35

## 2022-09-15 ASSESSMENT — ENCOUNTER SYMPTOMS
COUGH: 1
SHORTNESS OF BREATH: 0
DIARRHEA: 1
RHINORRHEA: 0
BLOOD IN STOOL: 1
NAUSEA: 1
VOMITING: 1
ABDOMINAL PAIN: 0
SORE THROAT: 1
EYE REDNESS: 0

## 2022-09-15 ASSESSMENT — PAIN SCALES - GENERAL: PAINLEVEL_OUTOF10: 7

## 2022-09-15 ASSESSMENT — PAIN DESCRIPTION - LOCATION: LOCATION: ABDOMEN

## 2022-09-15 ASSESSMENT — PAIN - FUNCTIONAL ASSESSMENT: PAIN_FUNCTIONAL_ASSESSMENT: 0-10

## 2022-09-15 NOTE — ED PROVIDER NOTES
[facility]  eMERGENCY dEPARTMENT eNCOUnter      Pt Name: Katerine Stroud. MRN: [id number]  Armskristagfguevara 1960  Date of evaluation: 9/15/2022  Provider: Mary Arellano MD    CHIEF COMPLAINT       Chief Complaint   Patient presents with    Positive For Covid-19     Patient states symptoms x 2 days, took home test last night that he thinks was positive, recent hospital admit for pneumonia, currently c/o bloody diarrhea, abd pain, cough, sweating, dizziness         HISTORY OF PRESENT ILLNESS   (Location/Symptom, Timing/Onset,Context/Setting, Quality, Duration, Modifying Factors, Severity)  Note limiting factors. Katerine Stroud is a 58 y.o. male who presents to the emergency department due to 3-day history of fever, headache, sore throat, body aches, cough. He states he tested at home for COVID and his test showed a \"P line \". Reports some associated nausea vomiting poor p.o. tolerance and liquidy stool which she states has some blood in it. He states in August he was admitted to the hospital for aspiration pneumonia. Patient also reports about 8 months ago he had a myocardial infarction. He has a past medical history of coronary artery disease, hyperlipidemia and hypertension. He denies a history of CHF or COPD. HPI    NursingNotes were reviewed. REVIEW OF SYSTEMS    (2-9 systems for level 4, 10 or more for level 5)     Review of Systems   Constitutional:  Positive for fever. HENT:  Positive for sore throat. Negative for rhinorrhea. Eyes:  Negative for redness. Respiratory:  Positive for cough. Negative for shortness of breath. Cardiovascular:  Negative for chest pain. Gastrointestinal:  Positive for blood in stool, diarrhea, nausea and vomiting. Negative for abdominal pain. Genitourinary:  Negative for flank pain. Musculoskeletal:  Positive for myalgias. Skin:  Negative for rash. Neurological:  Positive for headaches. Hematological:  Negative for adenopathy. Psychiatric/Behavioral:  Negative for confusion. Except as noted above the remainder of the review of systems was reviewed and negative. PAST MEDICAL HISTORY     Past Medical History:   Diagnosis Date    CAD (coronary artery disease)     Hyperlipidemia     Hypertension          SURGICALHISTORY     History reviewed. No pertinent surgical history. CURRENT MEDICATIONS       Previous Medications    AMLODIPINE (NORVASC) 5 MG TABLET    TAKE ONE TABLET BY MOUTH DAILY    AMOXICILLIN-CLAVULANATE (AUGMENTIN) 875-125 MG PER TABLET        ASPIRIN 81 MG CHEWABLE TABLET    Take 1 tablet by mouth in the morning. ATORVASTATIN (LIPITOR) 20 MG TABLET    Take 1 tablet by mouth nightly    BUDESONIDE-FORMOTEROL (SYMBICORT) 160-4.5 MCG/ACT AERO    Inhale 2 puffs into the lungs 2 times daily For long-term control of COPD symptoms. FOLIC ACID (FOLVITE) 1 MG TABLET    Take 1 tablet by mouth in the morning. MAGNESIUM 400 MG TABS    Take 1 tablet by mouth daily       ALLERGIES     Patient has no known allergies.     FAMILY HISTORY       Family History   Problem Relation Age of Onset    Diabetes Mother     Liver Disease Father     Liver Disease Brother           SOCIAL HISTORY       Social History     Socioeconomic History    Marital status: Single     Spouse name: None    Number of children: 1    Years of education: None    Highest education level: None   Tobacco Use    Smoking status: Every Day     Packs/day: 0.50     Years: 46.00     Pack years: 23.00     Types: Cigarettes     Start date: 1975    Smokeless tobacco: Never   Vaping Use    Vaping Use: Never used   Substance and Sexual Activity    Alcohol use: Yes     Comment: 6 beers and 1 pint of vodka per day    Drug use: Yes     Types: Marijuana Champ Cue)     Comment: occ    Sexual activity: Yes     Partners: Female     Social Determinants of Health     Financial Resource Strain: Unknown    Difficulty of Paying Living Expenses: Patient refused   Food Insecurity: Unknown    Worried About Running Out of Food in the Last Year: Patient refused    [address] in the Last Year: Patient refused       SCREENINGS    Leonardo Coma Scale  Eye Opening: Spontaneous  Best Verbal Response: Oriented  Best Motor Response: Obeys commands  [place] Coma Scale Score: 15        PHYSICAL EXAM    (up to 7 for level 4, 8 or more for level 5)     ED Triage Vitals [09/15/22 1403]   BP Temp Temp src Heart Rate Resp SpO2 Height Weight   (!) 152/92 100.4 °F (38 °C) -- (!) 102 20 98 % 5' 7\" (1.702 m) 152 lb 5.4 oz (69.1 kg)       Physical Exam  Vitals and nursing note reviewed. Constitutional:       Appearance: He is well-developed. He is not diaphoretic. HENT:      Head: Normocephalic and atraumatic. Mouth/Throat:      Mouth: Mucous membranes are moist.   Eyes:      General:         Right eye: No discharge. Left eye: No discharge. Conjunctiva/sclera: Conjunctivae normal.   Cardiovascular:      Rate and Rhythm: Tachycardia present. Pulses: Normal pulses. Heart sounds: No murmur heard. Pulmonary:      Effort: Pulmonary effort is normal. No respiratory distress. Breath sounds: No wheezing, rhonchi or rales. Abdominal:      General: There is no distension. Palpations: Abdomen is soft. Tenderness: There is no abdominal tenderness. There is no guarding or rebound. Musculoskeletal:         General: Normal range of motion. Cervical back: Neck supple. Skin:     General: Skin is warm and dry. Capillary Refill: Capillary refill takes less than 2 seconds. Neurological:      Mental Status: He is alert and oriented to person, place, and time.    Psychiatric:         Behavior: Behavior normal.       DIAGNOSTIC RESULTS     EKG: All EKG's are interpreted by the Emergency Department Physician who either signs or Co-signsthis chart in the absence of a cardiologist.    The Ekg interpreted by me shows  normal sinus rhythm with a rate of 100  Axis is   82  QTc is   456 msec  Intervals and Durations are unremarkable. ST Segments: nonspecific changes  Compared to an EKG performed on September 1, 2022 sinus rhythm has been replaced with sinus rhythm with some nonspecific ST wave changes        RADIOLOGY:   Non-plain filmimages such as CT, Ultrasound and MRI are read by the radiologist. Plain radiographic images are visualized and preliminarily interpreted by the emergency physician with the below findings:        Interpretation per the Radiologist below, if available at the time ofthis note:    XR CHEST PORTABLE   Final Result   No radiographic evidence of acute pulmonary disease. ED BEDSIDE ULTRASOUND:   Performed by ED Physician - none    LABS:  Labs Reviewed   CBC WITH AUTO DIFFERENTIAL - Abnormal; Notable for the following components:       Result Value    RBC 3.87 (*)     Hemoglobin 13.3 (*)     Hematocrit 37.9 (*)     MCH 34.5 (*)     All other components within normal limits   COMPREHENSIVE METABOLIC PANEL - Abnormal; Notable for the following components:    Sodium 132 (*)     Chloride 96 (*)     Glucose 136 (*)     Creatinine 0.6 (*)     Total Bilirubin 1.4 (*)     Alkaline Phosphatase 136 (*)     ALT 41 (*)     AST 79 (*)     All other components within normal limits   COVID-19, RAPID   GASTROINTESTINAL PANEL, MOLECULAR   C DIFF TOXIN/ANTIGEN   TROPONIN   LACTIC ACID   PROCALCITONIN   BLOOD OCCULT STOOL SCREEN #1       All other labs were within normal range or not returned as of this dictation.     EMERGENCY DEPARTMENT COURSE and DIFFERENTIAL DIAGNOSIS/MDM:   Vitals:    Vitals:    09/15/22 1459 09/15/22 1514 09/15/22 1524 09/15/22 1525   BP: (!) 149/87 (!) 153/89     Pulse: 78 82 80 78   Resp: 18 24 15 15   Temp:       SpO2: 95% 95% 94% 95%   Weight:       Height:               MDM  Number of Diagnoses or Management Options  COVID-19  Diarrhea, unspecified type  Non-intractable vomiting with nausea, unspecified vomiting type  Diagnosis management comments: DDX: COVID, pneumonia, dehydration, other    Patient had a home positive test for COVID and his pulse ox little bit decreased air with a temperature of 100.4. He is not tachycardic or tachypneic or hypoxic. His work-up here shows a normal BUN to creatinine ratio. Procalcitonin is low which argues against a bacterial infection. Troponin and lactic acid are unremarkable. The patient is mildly anemic. Rapid COVID test is negative here. EKG shows sinus rhythm without acute changes and his chest x-ray shows no radiographic evidence of pulmonary disease. Patient had no vomiting or diarrhea in the ED. Since he had no diarrhea were unable to send any stool for culture. He will be treated symptomatically with Zofran as an outpatient. He is tolerating p.o. well in the ED. He is to return here as he is unlikely to be able to get repeat testing as an outpatient should his bleeding worsen or continue for more than 24 hours. The patient was counseled to closely monitor their symptoms, and to return immediately to the Emergency Department for any difficulty breathing, confusion, dizziness or passing out, vomiting, chest pain, high fevers, weakness, or any other new or concerning symptoms. There is no evidence of emergent medical condition at this time, and the patient will be discharged in good condition. Is this patient to be included in the SEP-1 Core Measure? No   Exclusion criteria - the patient is NOT to be included for SEP-1 Core Measure due to:  Viral etiology found or highly suspected (including COVID-19) without concomitant bacterial infection     CRITICAL CARE TIME   I personally saw the patient and independently provided 15 minutes of non-concurrent critical care out of the total shared critical care time provided.   There was a high probability of clinically significant/life threatening deterioration in the patient's condition which required my urgent intervention. CONSULTS:  None    PROCEDURES:  Unless otherwise noted below, none     Procedures    FINAL IMPRESSION      1. COVID-19    2. Non-intractable vomiting with nausea, unspecified vomiting type    3.  Diarrhea, unspecified type          DISPOSITION/PLAN   DISPOSITION Decision To Discharge 09/15/2022 04:48:01 PM      PATIENT REFERRED TO:  JAMMIE Wise - RHIANNON  [address]  [phone]    Call in 1 day      DISCHARGE MEDICATIONS:  New Prescriptions    ONDANSETRON (ZOFRAN ODT) 4 MG DISINTEGRATING TABLET    Take 1 tablet by mouth every 8 hours as needed for Nausea          (Please note that portions of this note were completed with a voice recognition program.Efforts were made to edit the dictations but occasionally words are mis-transcribed.)    Wojciech Alamo MD (electronically signed)  Attending Emergency Physician          Wojciech Alamo MD  09/15/22 5467

## 2022-09-15 NOTE — DISCHARGE INSTRUCTIONS
Please return for worsening blood in the stool, feeling weak, your shortness of breath worsens, or your oxygen drops below 90%.

## 2022-09-27 ENCOUNTER — OFFICE VISIT (OUTPATIENT)
Dept: FAMILY MEDICINE CLINIC | Age: 62
End: 2022-09-27
Payer: COMMERCIAL

## 2022-09-27 VITALS
TEMPERATURE: 98.4 F | RESPIRATION RATE: 16 BRPM | OXYGEN SATURATION: 97 % | HEIGHT: 67 IN | WEIGHT: 143 LBS | HEART RATE: 86 BPM | SYSTOLIC BLOOD PRESSURE: 126 MMHG | DIASTOLIC BLOOD PRESSURE: 74 MMHG | BODY MASS INDEX: 22.44 KG/M2

## 2022-09-27 DIAGNOSIS — R74.8 ELEVATED LIVER ENZYMES: ICD-10-CM

## 2022-09-27 DIAGNOSIS — J43.8 OTHER EMPHYSEMA (HCC): ICD-10-CM

## 2022-09-27 DIAGNOSIS — E53.8 FOLATE DEFICIENCY: ICD-10-CM

## 2022-09-27 DIAGNOSIS — E04.1 THYROID NODULE: ICD-10-CM

## 2022-09-27 DIAGNOSIS — F10.10 ALCOHOL ABUSE: ICD-10-CM

## 2022-09-27 DIAGNOSIS — I25.10 CORONARY ARTERY DISEASE INVOLVING NATIVE CORONARY ARTERY OF NATIVE HEART WITHOUT ANGINA PECTORIS: ICD-10-CM

## 2022-09-27 DIAGNOSIS — E83.42 HYPOMAGNESEMIA: ICD-10-CM

## 2022-09-27 DIAGNOSIS — I10 ESSENTIAL HYPERTENSION: ICD-10-CM

## 2022-09-27 DIAGNOSIS — G47.00 INSOMNIA, UNSPECIFIED TYPE: ICD-10-CM

## 2022-09-27 PROCEDURE — 4004F PT TOBACCO SCREEN RCVD TLK: CPT | Performed by: NURSE PRACTITIONER

## 2022-09-27 PROCEDURE — 3017F COLORECTAL CA SCREEN DOC REV: CPT | Performed by: NURSE PRACTITIONER

## 2022-09-27 PROCEDURE — 99214 OFFICE O/P EST MOD 30 MIN: CPT | Performed by: NURSE PRACTITIONER

## 2022-09-27 PROCEDURE — G8427 DOCREV CUR MEDS BY ELIG CLIN: HCPCS | Performed by: NURSE PRACTITIONER

## 2022-09-27 PROCEDURE — G8420 CALC BMI NORM PARAMETERS: HCPCS | Performed by: NURSE PRACTITIONER

## 2022-09-27 PROCEDURE — 3023F SPIROM DOC REV: CPT | Performed by: NURSE PRACTITIONER

## 2022-09-27 RX ORDER — ATORVASTATIN CALCIUM 20 MG/1
20 TABLET, FILM COATED ORAL NIGHTLY
Qty: 30 TABLET | Refills: 5 | Status: SHIPPED | OUTPATIENT
Start: 2022-09-27

## 2022-09-27 RX ORDER — FOLIC ACID 1 MG/1
1 TABLET ORAL DAILY
Qty: 90 TABLET | Refills: 0 | Status: SHIPPED | OUTPATIENT
Start: 2022-09-27

## 2022-09-27 RX ORDER — AMLODIPINE BESYLATE 5 MG/1
TABLET ORAL
Qty: 30 TABLET | Refills: 5 | Status: SHIPPED | OUTPATIENT
Start: 2022-09-27

## 2022-09-27 RX ORDER — CALCIUM CARBONATE 300MG(750)
1 TABLET,CHEWABLE ORAL DAILY
Qty: 30 TABLET | Refills: 1 | Status: SHIPPED | OUTPATIENT
Start: 2022-09-27

## 2022-09-27 RX ORDER — TRAZODONE HYDROCHLORIDE 50 MG/1
50 TABLET ORAL NIGHTLY PRN
Qty: 30 TABLET | Refills: 1 | Status: SHIPPED | OUTPATIENT
Start: 2022-09-27

## 2022-09-27 RX ORDER — BUDESONIDE AND FORMOTEROL FUMARATE DIHYDRATE 160; 4.5 UG/1; UG/1
2 AEROSOL RESPIRATORY (INHALATION) 2 TIMES DAILY
Qty: 1 EACH | Refills: 2 | Status: SHIPPED | OUTPATIENT
Start: 2022-09-27

## 2022-09-27 RX ORDER — ASPIRIN 81 MG/1
81 TABLET, CHEWABLE ORAL DAILY
Qty: 30 TABLET | Refills: 5 | Status: SHIPPED | OUTPATIENT
Start: 2022-09-27

## 2022-09-27 ASSESSMENT — ENCOUNTER SYMPTOMS
NAUSEA: 0
BACK PAIN: 1
VOMITING: 0
DIARRHEA: 0
SHORTNESS OF BREATH: 1
CONSTIPATION: 0
COUGH: 0
CHEST TIGHTNESS: 0

## 2022-09-27 NOTE — PROGRESS NOTES
9/27/2022    This is a 58 y.o. male   Chief Complaint   Patient presents with    Hypertension     Bp has been good. Insomnia     X3-4 weeks. Can't get to sleep. Other     Was at er for nausea on 9/15/22   . HPI  CAD- seen cardiologist Dr. Bahman Ramirez-- completed echocardiogram and showed normal LV function but bubble study was abnormal. Seen Dr. Kadnace Galindo and there was no indication to close small PFO.  ordered repeat echo and myoview, but he has not scheduled. History of abnormal severe coronary artery calcification per CT chest followed by lexiscan and echocardiogram which were within acceptable limits. Had left and right heart cath (8/2020) reveling non obstructive CAD and preserved left ventricular systolic function. He has not been consistent with taking his ASA or atorvastatin. Hypertension:  Home blood pressure monitoring: No.  He is not adherent to a low sodium diet. Patient denies chest pain, headache, lightheadedness, peripheral edema and palpitations. Has not been consistent with taking amlodipine     Hyperlipidemia:  Prescribed atorvastatin 20 mg daily, but he has not been consistent with taking. Lab Results   Component Value Date    LABA1C 5.4 09/27/2021     Lab Results   Component Value Date    LABMICR YES 08/02/2022    CREATININE 0.6 (L) 09/15/2022     Lab Results   Component Value Date    ALT 41 (H) 09/15/2022    AST 79 (H) 09/15/2022     Lab Results   Component Value Date    HDL 89 (H) 06/18/2021    LDLCALC 74 06/18/2021        Elevated liver enzymes- Ordered abd u/s that showed fatty liver. Reports that he has decreased his drinking-- He reports that he has not been drinking vodka in the past 1.5 months. He continues to drink 2- 24 oz beers per day. Has not been taking tylenol. Had appt with GI Dr. Jodi Mccoy on 8/31/21 but did not go and hasn't rescheduled. Having a hard time sleeping at night. Not taking anything to help him sleep.    Falls asleep around 11 in the chair and will wake up around 230 AM.   He will nap throughout the day. Folate deficiency- has not yet picked up his folic acid prescription from his pharmacy. Completed PFT 8/2021 showed normal study with bronchodilator response--may suggest underlying asthma. Reports that he continues with shortness of breath with any walking. Denies wheezing. Reports that he was smoking 1 ppd. Now he is down to 3/4 ppd. He did not obtain the nicoderm patches due to cost.     Started on symbicort and doing 2 puffs BID inconsistently. Feels that it is helping slightly. Has not needed the albuterol inhaler. Will have yellow sputum production. Thyroid goiter and thyroid nodule. He reports that he has never had a biopsy. TSH normal 6/18/21   Has been referred to have nodule biopsied, which he has never completed. Has been referred to endo, but he no showed his last scheduled appt. Patient Active Problem List   Diagnosis    Thyroid goiter    Chest pain    SOB (shortness of breath)    Essential hypertension    Elevated liver enzymes    Alcohol abuse    Folate deficiency    Coronary artery disease involving native coronary artery of native heart without angina pectoris    Other emphysema (HCC)- seen on CT scan     PFO (patent foramen ovale)    Respiratory failure with hypoxia (HCC)    Hypomagnesemia    Thyroid nodule          Current Outpatient Medications   Medication Sig Dispense Refill    ondansetron (ZOFRAN ODT) 4 MG disintegrating tablet Take 1 tablet by mouth every 8 hours as needed for Nausea 20 tablet 0    Magnesium 400 MG TABS Take 1 tablet by mouth daily 30 tablet 0    folic acid (FOLVITE) 1 MG tablet Take 1 tablet by mouth in the morning. 90 tablet 1    aspirin 81 MG chewable tablet Take 1 tablet by mouth in the morning.  30 tablet 3    atorvastatin (LIPITOR) 20 MG tablet Take 1 tablet by mouth nightly 30 tablet 3    amLODIPine (NORVASC) 5 MG tablet TAKE ONE TABLET BY MOUTH DAILY 30 tablet 1 budesonide-formoterol (SYMBICORT) 160-4.5 MCG/ACT AERO Inhale 2 puffs into the lungs 2 times daily For long-term control of COPD symptoms. 1 each 2    amoxicillin-clavulanate (AUGMENTIN) 875-125 MG per tablet  (Patient not taking: Reported on 9/27/2022)       No current facility-administered medications for this visit. No Known Allergies    Review of Systems   Constitutional:  Negative for activity change. Respiratory:  Positive for shortness of breath. Negative for cough and chest tightness. Cardiovascular:  Negative for chest pain and palpitations. Gastrointestinal:  Negative for constipation, diarrhea, nausea and vomiting. Musculoskeletal:  Positive for arthralgias, back pain and myalgias. Neurological:  Negative for dizziness, syncope, weakness and headaches. Psychiatric/Behavioral:  Negative for confusion. The patient has insomnia. Vitals:    09/27/22 0902   BP: 126/74   Site: Right Upper Arm   Position: Sitting   Cuff Size: Medium Adult   Pulse: 86   Resp: 16   Temp: 98.4 °F (36.9 °C)   TempSrc: Oral   SpO2: 97%   Weight: 143 lb (64.9 kg)   Height: 5' 7\" (1.702 m)       Body mass index is 22.4 kg/m². Wt Readings from Last 3 Encounters:   09/27/22 143 lb (64.9 kg)   09/15/22 152 lb 5.4 oz (69.1 kg)   09/01/22 153 lb 9.6 oz (69.7 kg)       BP Readings from Last 3 Encounters:   09/27/22 126/74   09/15/22 (!) 151/87   09/01/22 124/72       Physical Exam  Vitals and nursing note reviewed. Constitutional:       General: He is not in acute distress. Appearance: He is well-developed. HENT:      Head: Normocephalic and atraumatic. Eyes:      Extraocular Movements: Extraocular movements intact. Conjunctiva/sclera: Conjunctivae normal.   Neck:      Thyroid: Thyromegaly present. Cardiovascular:      Rate and Rhythm: Normal rate and regular rhythm. Heart sounds: Normal heart sounds. No murmur heard. No friction rub. No gallop.    Pulmonary:      Effort: Pulmonary effort is normal. No respiratory distress. Breath sounds: Normal breath sounds. Musculoskeletal:      Cervical back: Neck supple. Right lower leg: No edema. Left lower leg: No edema. Skin:     General: Skin is warm and dry. Neurological:      Mental Status: He is alert and oriented to person, place, and time. Psychiatric:         Behavior: Behavior normal.         Thought Content: Thought content normal.         Judgment: Judgment normal.       Assessmentand Plan  Eusebia Niño was seen today for follow-up. Diagnoses and all orders for this visit:    Coronary artery disease involving native coronary artery of native heart without angina pectoris  Left and right heart cath from 8/2020 showed non obstructive CAD. Advised to take ASA 81 mg daily and atorvastatin 20 mg daily  Advised smoking cessation. Continue f/u with cardiologist Dr. Didier Mullen of breath/ emphysema   PFT was suggestive of possible asthma. CT lung screening showed emphysema. symbicort 2 puffs BID. Rinse mouth out after use. Albuterol inhaler PRN  Smoking cessation discussed and needed    Elevated liver enzymes: liver function from 9/15/22 were stable. Liver u/s showed fatty liver. Should continue to work on decreasing alcohol intake  Advised to avoid tylenol  Needs to f/u with GI- advised to schedule with Dr. Hector Lion    Alcohol abuse  Advised to continue to decrease alcohol intake  He is not interested in a rehab program    Thyroid nodule: Advised again that he needs endo evaluation and biopsy. He reports that he will schedule     Folate deficiency  Advised to start folic acid 1 mg daily as previously been ordered     Tobacco abuse  Smoking cessation discussed and needed. Reports that nicoderm was too expensive. He is working on decreasing     Hypomagnesemia  Has not been taking supplement. Advised to restart magnesium 400 mg daily. Will repeat lab at next visit once he has been taking supplement.      History of aspiration pneumonia   Repeat chest xray from 9/15/22 was normal.     Insomnia-   Discussed this is likely r/t alcohol use and stressed the importance of decreasing and stopping use. Will have him take trazodone 50 mg qhs PRN    Advised to obtain pneumovax and flu vaccine at his pharmacy    Return in about 4 weeks (around 10/25/2022), or if symptoms worsen or fail to improve, for chronic conditions.

## 2022-10-10 ENCOUNTER — TELEPHONE (OUTPATIENT)
Dept: CASE MANAGEMENT | Age: 62
End: 2022-10-10

## 2024-07-23 ENCOUNTER — HOSPITAL ENCOUNTER (OUTPATIENT)
Dept: ULTRASOUND IMAGING | Age: 64
Discharge: HOME | End: 2024-07-23
Payer: COMMERCIAL

## 2024-07-23 DIAGNOSIS — E04.9 THYROID GOITER: ICD-10-CM

## 2024-07-23 DIAGNOSIS — E04.1 THYROID NODULE: ICD-10-CM

## 2024-07-23 PROCEDURE — 76536 US EXAM OF HEAD AND NECK: CPT

## 2024-11-19 ENCOUNTER — APPOINTMENT (OUTPATIENT)
Dept: GENERAL RADIOLOGY | Age: 64
DRG: 948 | End: 2024-11-19
Payer: MEDICARE

## 2024-11-19 ENCOUNTER — APPOINTMENT (OUTPATIENT)
Dept: CT IMAGING | Age: 64
DRG: 948 | End: 2024-11-19
Payer: MEDICARE

## 2024-11-19 ENCOUNTER — HOSPITAL ENCOUNTER (INPATIENT)
Age: 64
LOS: 1 days | Discharge: HOME | DRG: 948 | End: 2024-11-20
Attending: EMERGENCY MEDICINE | Admitting: FAMILY MEDICINE
Payer: MEDICARE

## 2024-11-19 DIAGNOSIS — R07.9 CHEST PAIN, UNSPECIFIED TYPE: Primary | ICD-10-CM

## 2024-11-19 PROBLEM — R79.89 ELEVATED TROPONIN: Status: ACTIVE | Noted: 2024-11-19

## 2024-11-19 LAB
ALBUMIN SERPL-MCNC: 4.2 G/DL (ref 3.4–5)
ALBUMIN/GLOB SERPL: 1.6 {RATIO} (ref 1.1–2.2)
ALP SERPL-CCNC: 112 U/L (ref 40–129)
ALT SERPL-CCNC: 22 U/L (ref 10–40)
ANION GAP SERPL CALCULATED.3IONS-SCNC: 12 MMOL/L (ref 3–16)
APTT BLD: 25.7 SEC (ref 22.1–36.4)
AST SERPL-CCNC: 40 U/L (ref 15–37)
BASOPHILS # BLD: 0.2 K/UL (ref 0–0.2)
BASOPHILS NFR BLD: 2 %
BILIRUB SERPL-MCNC: 0.9 MG/DL (ref 0–1)
BUN SERPL-MCNC: 12 MG/DL (ref 7–20)
CALCIUM SERPL-MCNC: 9.3 MG/DL (ref 8.3–10.6)
CHLORIDE SERPL-SCNC: 106 MMOL/L (ref 99–110)
CO2 SERPL-SCNC: 22 MMOL/L (ref 21–32)
CREAT SERPL-MCNC: 0.9 MG/DL (ref 0.8–1.3)
DEPRECATED RDW RBC AUTO: 14.9 % (ref 12.4–15.4)
EKG ATRIAL RATE: 77 BPM
EKG DIAGNOSIS: NORMAL
EKG P AXIS: 73 DEGREES
EKG P-R INTERVAL: 132 MS
EKG Q-T INTERVAL: 388 MS
EKG QRS DURATION: 88 MS
EKG QTC CALCULATION (BAZETT): 439 MS
EKG R AXIS: 93 DEGREES
EKG T AXIS: 59 DEGREES
EKG VENTRICULAR RATE: 77 BPM
EOSINOPHIL # BLD: 0.5 K/UL (ref 0–0.6)
EOSINOPHIL NFR BLD: 5 %
FLUAV + FLUBV AG NOSE IA.RAPID: NOT DETECTED
FLUAV + FLUBV AG NOSE IA.RAPID: NOT DETECTED
GFR SERPLBLD CREATININE-BSD FMLA CKD-EPI: >90 ML/MIN/{1.73_M2}
GLUCOSE SERPL-MCNC: 84 MG/DL (ref 70–99)
HCT VFR BLD AUTO: 41 % (ref 40.5–52.5)
HGB BLD-MCNC: 14 G/DL (ref 13.5–17.5)
LYMPHOCYTES # BLD: 3.2 K/UL (ref 1–5.1)
LYMPHOCYTES NFR BLD: 30 %
MCH RBC QN AUTO: 33.4 PG (ref 26–34)
MCHC RBC AUTO-ENTMCNC: 34.1 G/DL (ref 31–36)
MCV RBC AUTO: 97.8 FL (ref 80–100)
MONOCYTES # BLD: 1 K/UL (ref 0–1.3)
MONOCYTES NFR BLD: 9 %
NEUTROPHILS # BLD: 5.8 K/UL (ref 1.7–7.7)
NEUTROPHILS NFR BLD: 54 %
NT-PROBNP SERPL-MCNC: <36 PG/ML (ref 0–124)
PLATELET # BLD AUTO: 313 K/UL (ref 135–450)
PLATELET BLD QL SMEAR: ABNORMAL
PMV BLD AUTO: 8.8 FL (ref 5–10.5)
POTASSIUM SERPL-SCNC: 4.4 MMOL/L (ref 3.5–5.1)
PROT SERPL-MCNC: 6.8 G/DL (ref 6.4–8.2)
RBC # BLD AUTO: 4.19 M/UL (ref 4.2–5.9)
REASON FOR REJECTION: NORMAL
REJECTED TEST: NORMAL
SARS-COV-2 RDRP RESP QL NAA+PROBE: NOT DETECTED
SODIUM SERPL-SCNC: 140 MMOL/L (ref 136–145)
TROPONIN, HIGH SENSITIVITY: 24 NG/L (ref 0–22)
TROPONIN, HIGH SENSITIVITY: 26 NG/L (ref 0–22)
WBC # BLD AUTO: 10.7 K/UL (ref 4–11)

## 2024-11-19 PROCEDURE — 84484 ASSAY OF TROPONIN QUANT: CPT

## 2024-11-19 PROCEDURE — 83880 ASSAY OF NATRIURETIC PEPTIDE: CPT

## 2024-11-19 PROCEDURE — 71260 CT THORAX DX C+: CPT

## 2024-11-19 PROCEDURE — 71046 X-RAY EXAM CHEST 2 VIEWS: CPT

## 2024-11-19 PROCEDURE — 6370000000 HC RX 637 (ALT 250 FOR IP): Performed by: FAMILY MEDICINE

## 2024-11-19 PROCEDURE — 99223 1ST HOSP IP/OBS HIGH 75: CPT | Performed by: STUDENT IN AN ORGANIZED HEALTH CARE EDUCATION/TRAINING PROGRAM

## 2024-11-19 PROCEDURE — 87502 INFLUENZA DNA AMP PROBE: CPT

## 2024-11-19 PROCEDURE — 93010 ELECTROCARDIOGRAM REPORT: CPT | Performed by: INTERNAL MEDICINE

## 2024-11-19 PROCEDURE — 85025 COMPLETE CBC W/AUTO DIFF WBC: CPT

## 2024-11-19 PROCEDURE — 6370000000 HC RX 637 (ALT 250 FOR IP): Performed by: NURSE PRACTITIONER

## 2024-11-19 PROCEDURE — 6360000004 HC RX CONTRAST MEDICATION: Performed by: EMERGENCY MEDICINE

## 2024-11-19 PROCEDURE — 2580000003 HC RX 258: Performed by: FAMILY MEDICINE

## 2024-11-19 PROCEDURE — 80053 COMPREHEN METABOLIC PANEL: CPT

## 2024-11-19 PROCEDURE — 6370000000 HC RX 637 (ALT 250 FOR IP): Performed by: EMERGENCY MEDICINE

## 2024-11-19 PROCEDURE — 1200000000 HC SEMI PRIVATE

## 2024-11-19 PROCEDURE — 99285 EMERGENCY DEPT VISIT HI MDM: CPT

## 2024-11-19 PROCEDURE — 93005 ELECTROCARDIOGRAM TRACING: CPT | Performed by: EMERGENCY MEDICINE

## 2024-11-19 PROCEDURE — 85730 THROMBOPLASTIN TIME PARTIAL: CPT

## 2024-11-19 PROCEDURE — 36415 COLL VENOUS BLD VENIPUNCTURE: CPT

## 2024-11-19 PROCEDURE — 87635 SARS-COV-2 COVID-19 AMP PRB: CPT

## 2024-11-19 RX ORDER — IOPAMIDOL 755 MG/ML
75 INJECTION, SOLUTION INTRAVASCULAR
Status: COMPLETED | OUTPATIENT
Start: 2024-11-19 | End: 2024-11-19

## 2024-11-19 RX ORDER — TRAZODONE HYDROCHLORIDE 50 MG/1
50 TABLET, FILM COATED ORAL NIGHTLY PRN
Status: DISCONTINUED | OUTPATIENT
Start: 2024-11-19 | End: 2024-11-20 | Stop reason: HOSPADM

## 2024-11-19 RX ORDER — METAXALONE 800 MG/1
800 TABLET ORAL 3 TIMES DAILY
Status: DISCONTINUED | OUTPATIENT
Start: 2024-11-19 | End: 2024-11-20 | Stop reason: HOSPADM

## 2024-11-19 RX ORDER — HEPARIN SODIUM AND DEXTROSE 10000; 5 [USP'U]/100ML; G/100ML
0-4000 INJECTION INTRAVENOUS CONTINUOUS
Status: DISCONTINUED | OUTPATIENT
Start: 2024-11-19 | End: 2024-11-19

## 2024-11-19 RX ORDER — ONDANSETRON 2 MG/ML
4 INJECTION INTRAMUSCULAR; INTRAVENOUS EVERY 6 HOURS PRN
Status: DISCONTINUED | OUTPATIENT
Start: 2024-11-19 | End: 2024-11-20 | Stop reason: HOSPADM

## 2024-11-19 RX ORDER — HEPARIN SODIUM 1000 [USP'U]/ML
4000 INJECTION, SOLUTION INTRAVENOUS; SUBCUTANEOUS ONCE
Status: DISCONTINUED | OUTPATIENT
Start: 2024-11-19 | End: 2024-11-19

## 2024-11-19 RX ORDER — ASPIRIN 81 MG/1
243 TABLET, CHEWABLE ORAL ONCE
Status: COMPLETED | OUTPATIENT
Start: 2024-11-19 | End: 2024-11-19

## 2024-11-19 RX ORDER — ACETAMINOPHEN 325 MG/1
650 TABLET ORAL EVERY 6 HOURS PRN
Status: DISCONTINUED | OUTPATIENT
Start: 2024-11-19 | End: 2024-11-20 | Stop reason: HOSPADM

## 2024-11-19 RX ORDER — ATORVASTATIN CALCIUM 20 MG/1
20 TABLET, FILM COATED ORAL NIGHTLY
Status: DISCONTINUED | OUTPATIENT
Start: 2024-11-19 | End: 2024-11-20 | Stop reason: HOSPADM

## 2024-11-19 RX ORDER — FLUTICASONE PROPIONATE AND SALMETEROL 100; 50 UG/1; UG/1
1 POWDER RESPIRATORY (INHALATION) EVERY 12 HOURS
COMMUNITY

## 2024-11-19 RX ORDER — MAGNESIUM SULFATE IN WATER 40 MG/ML
2000 INJECTION, SOLUTION INTRAVENOUS PRN
Status: DISCONTINUED | OUTPATIENT
Start: 2024-11-19 | End: 2024-11-20 | Stop reason: HOSPADM

## 2024-11-19 RX ORDER — ACETAMINOPHEN 650 MG/1
650 SUPPOSITORY RECTAL EVERY 6 HOURS PRN
Status: DISCONTINUED | OUTPATIENT
Start: 2024-11-19 | End: 2024-11-20 | Stop reason: HOSPADM

## 2024-11-19 RX ORDER — NAPROXEN 500 MG/1
500 TABLET ORAL 2 TIMES DAILY WITH MEALS
COMMUNITY

## 2024-11-19 RX ORDER — SODIUM CHLORIDE 0.9 % (FLUSH) 0.9 %
5-40 SYRINGE (ML) INJECTION EVERY 12 HOURS SCHEDULED
Status: DISCONTINUED | OUTPATIENT
Start: 2024-11-19 | End: 2024-11-20 | Stop reason: HOSPADM

## 2024-11-19 RX ORDER — POTASSIUM CHLORIDE 7.45 MG/ML
10 INJECTION INTRAVENOUS PRN
Status: DISCONTINUED | OUTPATIENT
Start: 2024-11-19 | End: 2024-11-20 | Stop reason: HOSPADM

## 2024-11-19 RX ORDER — LISINOPRIL 2.5 MG/1
2.5 TABLET ORAL DAILY
COMMUNITY

## 2024-11-19 RX ORDER — LISINOPRIL 5 MG/1
2.5 TABLET ORAL DAILY
Status: DISCONTINUED | OUTPATIENT
Start: 2024-11-19 | End: 2024-11-20 | Stop reason: HOSPADM

## 2024-11-19 RX ORDER — SODIUM CHLORIDE 0.9 % (FLUSH) 0.9 %
5-40 SYRINGE (ML) INJECTION PRN
Status: DISCONTINUED | OUTPATIENT
Start: 2024-11-19 | End: 2024-11-20 | Stop reason: HOSPADM

## 2024-11-19 RX ORDER — ZOLPIDEM TARTRATE 5 MG/1
5 TABLET ORAL NIGHTLY PRN
Status: DISCONTINUED | OUTPATIENT
Start: 2024-11-19 | End: 2024-11-20 | Stop reason: HOSPADM

## 2024-11-19 RX ORDER — ASPIRIN 81 MG/1
81 TABLET, CHEWABLE ORAL DAILY
Status: DISCONTINUED | OUTPATIENT
Start: 2024-11-20 | End: 2024-11-20 | Stop reason: HOSPADM

## 2024-11-19 RX ORDER — HEPARIN SODIUM 1000 [USP'U]/ML
2000 INJECTION, SOLUTION INTRAVENOUS; SUBCUTANEOUS PRN
Status: DISCONTINUED | OUTPATIENT
Start: 2024-11-19 | End: 2024-11-19

## 2024-11-19 RX ORDER — POTASSIUM CHLORIDE 1500 MG/1
40 TABLET, EXTENDED RELEASE ORAL PRN
Status: DISCONTINUED | OUTPATIENT
Start: 2024-11-19 | End: 2024-11-20 | Stop reason: HOSPADM

## 2024-11-19 RX ORDER — TRAMADOL HYDROCHLORIDE 50 MG/1
50 TABLET ORAL ONCE
Status: COMPLETED | OUTPATIENT
Start: 2024-11-19 | End: 2024-11-19

## 2024-11-19 RX ORDER — SODIUM CHLORIDE 9 MG/ML
INJECTION, SOLUTION INTRAVENOUS PRN
Status: DISCONTINUED | OUTPATIENT
Start: 2024-11-19 | End: 2024-11-20 | Stop reason: HOSPADM

## 2024-11-19 RX ORDER — LIDOCAINE 4 G/G
1 PATCH TOPICAL DAILY
Status: DISCONTINUED | OUTPATIENT
Start: 2024-11-19 | End: 2024-11-20 | Stop reason: HOSPADM

## 2024-11-19 RX ORDER — ZOLPIDEM TARTRATE 5 MG/1
5 TABLET ORAL NIGHTLY PRN
COMMUNITY

## 2024-11-19 RX ORDER — ONDANSETRON 4 MG/1
4 TABLET, ORALLY DISINTEGRATING ORAL EVERY 8 HOURS PRN
Status: DISCONTINUED | OUTPATIENT
Start: 2024-11-19 | End: 2024-11-20 | Stop reason: HOSPADM

## 2024-11-19 RX ORDER — POLYETHYLENE GLYCOL 3350 17 G/17G
17 POWDER, FOR SOLUTION ORAL DAILY PRN
Status: DISCONTINUED | OUTPATIENT
Start: 2024-11-19 | End: 2024-11-20 | Stop reason: HOSPADM

## 2024-11-19 RX ORDER — HEPARIN SODIUM 1000 [USP'U]/ML
4000 INJECTION, SOLUTION INTRAVENOUS; SUBCUTANEOUS PRN
Status: DISCONTINUED | OUTPATIENT
Start: 2024-11-19 | End: 2024-11-19

## 2024-11-19 RX ADMIN — LISINOPRIL 2.5 MG: 5 TABLET ORAL at 16:32

## 2024-11-19 RX ADMIN — ASPIRIN 243 MG: 81 TABLET, CHEWABLE ORAL at 12:33

## 2024-11-19 RX ADMIN — IOPAMIDOL 75 ML: 755 INJECTION, SOLUTION INTRAVENOUS at 10:43

## 2024-11-19 RX ADMIN — METAXALONE 800 MG: 800 TABLET ORAL at 21:19

## 2024-11-19 RX ADMIN — ATORVASTATIN CALCIUM 20 MG: 20 TABLET, FILM COATED ORAL at 21:19

## 2024-11-19 RX ADMIN — ZOLPIDEM TARTRATE 5 MG: 5 TABLET ORAL at 21:19

## 2024-11-19 RX ADMIN — SODIUM CHLORIDE, PRESERVATIVE FREE 10 ML: 5 INJECTION INTRAVENOUS at 21:22

## 2024-11-19 RX ADMIN — TRAMADOL HYDROCHLORIDE 50 MG: 50 TABLET ORAL at 21:19

## 2024-11-19 RX ADMIN — ACETAMINOPHEN 325MG 650 MG: 325 TABLET ORAL at 19:08

## 2024-11-19 ASSESSMENT — PAIN SCALES - GENERAL
PAINLEVEL_OUTOF10: 4
PAINLEVEL_OUTOF10: 8
PAINLEVEL_OUTOF10: 6
PAINLEVEL_OUTOF10: 7
PAINLEVEL_OUTOF10: 8

## 2024-11-19 ASSESSMENT — PAIN DESCRIPTION - LOCATION
LOCATION: BACK;CHEST
LOCATION: BACK;CHEST
LOCATION: BACK;SHOULDER

## 2024-11-19 ASSESSMENT — PAIN - FUNCTIONAL ASSESSMENT: PAIN_FUNCTIONAL_ASSESSMENT: ACTIVITIES ARE NOT PREVENTED

## 2024-11-19 ASSESSMENT — PAIN DESCRIPTION - DESCRIPTORS
DESCRIPTORS: ACHING
DESCRIPTORS: ACHING

## 2024-11-19 ASSESSMENT — LIFESTYLE VARIABLES
HOW MANY STANDARD DRINKS CONTAINING ALCOHOL DO YOU HAVE ON A TYPICAL DAY: 1 OR 2
HOW OFTEN DO YOU HAVE A DRINK CONTAINING ALCOHOL: MONTHLY OR LESS

## 2024-11-19 ASSESSMENT — PAIN DESCRIPTION - ORIENTATION: ORIENTATION: RIGHT

## 2024-11-19 NOTE — ACP (ADVANCE CARE PLANNING)
Advance Care Planning   Healthcare Decision Maker:    Primary Decision Maker: Malvin MunizGregorio - [facility] - [phone]      Today we documented Decision Maker(s) consistent with Legal Next of Kin hierarchy.       Electronically signed by DEEPAK Armijo on 11/19/2024 at 12:40 PM

## 2024-11-19 NOTE — ED NOTES
Report called to  Briana      RN   To Room  4105  Cardiac monitor on during transfer  Pt's pain level  6/10   VSS, Afebrile   IV site is clean, dry and intact, Normal saline locked   Family updated on transfer per pt

## 2024-11-19 NOTE — CONSULTS
[facility]   CONSULTATION        Chief Complaint   Patient presents with    Chest Pain     Pt to ED from home c/o chest pain and back pain x1 week. Pt rates pain 10/10            History of Present Illness:  Gregorio Coombs Sr. is a 64 y.o. patient who presented to the hospital with complaints of chest pain. I have been asked to provide consultation regarding further management and testing.    This is a patient known to our practice follows with my partner Dr. Garcia.  Presents with chest pain sharp substernal with reported radiation to the back not particularly worse with exertion or relieved with rest.  CT scan with contrast of the chest performed without evidence of dissection or PE.  Biomarkers very mildly positive, EKG without acute ST-T changes.    History of angiography in 2020 with no obstructive coronary disease, history of nuclear stress test in 2022 with normal perfusion.    Past Medical History:   has a past medical history of CAD (coronary artery disease), Hyperlipidemia, and Hypertension.    Surgical History:   has no past surgical history on file.     Social History:   reports that he has been smoking cigarettes. He started smoking about 49 years ago. He has a 24.9 pack-year smoking history. He has never used smokeless tobacco. He reports current alcohol use. He reports current drug use. Drug: Marijuana (Weed).     Family History:  No family history of premature coronary artery disease, aortic disease, or valve disease.    Home Medications:  Were reviewed and are listed in nursing record. and/or listed below  Prior to Admission medications    Medication Sig Start Date End Date Taking? Authorizing Provider   fluticasone-salmeterol (ADVAIR) 100-50 MCG/ACT AEPB diskus inhaler Inhale 1 puff into the lungs in the morning and 1 puff in the evening.   Yes ProviderMisty MD   lisinopril (PRINIVIL;ZESTRIL) 2.5 MG tablet Take 1 tablet by mouth daily   Yes ProviderMisty MD   zolpidem  myocardial resting perfusion imaging.   -On delayed enhancement imaging, there is no abnormal hyperenhancement to suggest myocardial scar/inflammation/infiltration.       Old notes reviewed  Telemetry reviewed  Ekg personally reviewed  Chest xray personally reviewed  Echo, cath, and   Medications and labs reviewed  high complexity/medical decision making due to extensive data review, extensive history review, independent review of data  high risk due to acute illness, evaluation of drug-drug interactions, medication management and diagnostic interventions    Total visit time > 75 minutes; > 50% spend counseling / coordinating care. I reviewed interval history, physical exam, review of data including labs, imaging, development and implementation of treatment plan and coordination of complex care. Counseled on risk factor modifications.     Assessment/Plan:  Elevated troponin  Atypical chest pain  - mild biomarker elevation however history is not consistent with acute coronary syndrome, chest pains quite reproducible and honestly sounds more noncardiac.  Troponin elevation likely incidental and of minimal clinical significance  -Given biomarker elevation and history of nonobstructive CAD in 2020 we will perform a nuclear stress test  -Would not heparinize at this time  -Can continue aspirin and high potency statin      ---    I will address the patient's cardiac risk factors and adjusted pharmacologic treatment as needed. In addition, I have reinforced the need for patient directed risk factor modification.    Tobacco use was discussed with the patient and educated on the negative effects. I have asked the patient to not utilize these agents.    Thank you for allowing to us to participate in the care or Gregorio Coombs Sr.. Further evaluation will be based upon the patient's clinical course and testing results.    All questions and concerns were addressed to the patient/family. Alternatives to my treatment were  discussed. The note was completed using EMR. Every effort was made to ensure accuracy; however, inadvertent computerized transcription errors may be present.    Barrett Landry MD  Interventional Cardiology  11/19/2024  2:17 PM    [facility]  [address], [address]   [place] [address]  Ph: [phone]  Fax: [phone]    NOTE: This report was transcribed using voice recognition software. Every effort was made to ensure accuracy, however, inadvertent computerized transcription errors may be present.

## 2024-11-19 NOTE — ED NOTES
Pt returned from CT at this time, Pt resting in bed at this time, laying in a supine position with head of bed elevated . Call light remains in reach instructed pt how to use, and encouraged pt to call if needed assistance, no distress noted. RR even and unlabored, skin warm and dry. Side rails up x's 2, bed in lowest position  No needs at this time. Will continue to monitor closely.

## 2024-11-19 NOTE — PROGRESS NOTES
4 Eyes Skin Assessment     NAME:  Gregorio Coombs .  YOB: 1960  MEDICAL RECORD NUMBER:  [id number]    The patient is being assessed for  Admission    I agree that at least one RN has performed a thorough Head to Toe Skin Assessment on the patient. ALL assessment sites listed below have been assessed.      Areas assessed by both nurses:    Head, Face, Ears, Shoulders, Back, Chest, Arms, Elbows, Hands, Sacrum. Buttock, Coccyx, Ischium, and Legs. Feet and Heels        Does the Patient have a Wound? Yes wound(s) were present on assessment. LDA wound assessment was Initiated and completed by RN       Kurtis Prevention initiated by RN: No  Wound Care Orders initiated by RN: No    Pressure Injury (Stage 3,4, Unstageable, DTI, NWPT, and Complex wounds) if present, place Wound referral order by RN under : No    New Ostomies, if present place, Ostomy referral order under : No     Nurse 1 eSignature: Electronically signed by John Benton RN on 11/19/24 at 4:43 PM EST    **SHARE this note so that the co-signing nurse can place an eSignature**    Nurse 2 eSignature: Electronically signed by Herminia Horne RN on 11/19/24 at 4:45 PM EST

## 2024-11-19 NOTE — H&P
V2.0  History and Physical      Name:  Gregorio Coombs Sr. /Age/Sex: 1960  (64 y.o. male)   MRN & CSN:  [id number] & [id number] Encounter Date/Time: 2024 3:09 PM EST   Location:   PCP: Olena Morrissey APRN - CNP       Hospital Day: 1    Assessment and Plan:   Gregorio Coombs Sr. is a 64 y.o. male who presents with Elevated troponin    Hospital Problems             Last Modified POA    * (Principal) Elevated troponin 2024 Yes       Plan:    Elevated troponin:  - cardiology consulted:  does not need heparin drip as elevation is mild but warrents stress test  - nuclear stress test.  NPO after midnight    Chest pain:  - is reproducible with pressure, suspect MSK pain  - muscle relaxer, lidocaine patch    HTN:  - cont home lisinopril, amlodipine    Insomnia:  - cont home trazodone    Disposition:   Current Living situation: home  Expected Disposition: home  Estimated D/C: 1-2 days    Diet ADULT DIET; Regular   DVT Prophylaxis [] Lovenox, []  Heparin, [] SCDs, [] Ambulation,  [] Eliquis, [] Xarelto, [] Coumadin   Code Status Prior   Surrogate Decision Maker/ POA In the chart     Personally reviewed Lab Studies and Imaging     Discussed management of the case with ED provider who recommended cardiac workup        History from:     patient    History of Present Illness:     Chief Complaint: chest pain  Gregorio Coombs Sr. is a 64 y.o. male with pmh of HTN who presents with chest and back pain for the past several days.  He states it is just lateral to the right side of the sternum and he has it on the upper right paraspinous muscle as well.  No SOB, n/v, left sided pain or arm pain.  Has it when pressed on.  In the ED vital signs and routine labs were reassuring except mild troponin elevation of 26.  Chest CT PE was without pulmonary embolism.  ECG without acute ST segment or T wave changes and in normal sinus rhythm.  Medicine consulted for management due to elevated troponin and  caliber.  No evidence of right ventricular strain. Mediastinum: Heart size normal with atherosclerotic calcification of coronary arteries.  Normal aorta.  Normal esophagus.  No lymphadenopathy.  Prominent enlargement of the thyroid with a large left goiter that is stable from prior imaging. Lungs/pleura: The airways are patent.  No pleural fluid.  No acute airspace abnormality.  No pneumothorax.  Mild centrilobular emphysema with stable blebs at the right apex. Upper Abdomen: Visualized abdominal structures in the upper abdomen are normal. Soft Tissues/Bones: No skeletal abnormalities throughout the chest.     1. No pulmonary embolism or acute pulmonary process. 2. Stable emphysema. 3. Stable goiter.     XR CHEST (2 VW)    Result Date: 11/19/2024  EXAMINATION: TWO XRAY VIEWS OF THE CHEST 11/19/2024 8:52 am COMPARISON: 15 September 2022 HISTORY: ORDERING SYSTEM PROVIDED HISTORY: chest pain SOB TECHNOLOGIST PROVIDED HISTORY: Reason for exam:->chest pain SOB Reason for Exam: chest pain sob FINDINGS: Lungs: Well inflated. Normal bronchovascular markings. Heart and mediastinum: Heart is normal in size. Fullness to the left paratracheal region with slight deviation of the trachea to the right.Tari are symmetrical, no mass. Osseous structures: Unremarkable Abdomen: Nonspecific bowel gas pattern.     1. No acute cardiopulmonary process. 2. The trachea is slightly deviated to the right and recommend CT of the chest to further characterize.         Electronically signed by Mira Corral MD on 11/19/2024 at 3:09 PM

## 2024-11-19 NOTE — ED NOTES
Patient is choosing to keep their personal belongings in the hospital and has been notified that the hospital is not responsible for safeguarding any personal property or any other personal items kept in the hospital. Items have been placed in a Patient Belongings bag and labeled with a patient sticker. Patient/Family verbalized understanding.

## 2024-11-19 NOTE — CARE COORDINATION
Stress Test will happen tomorrow 11/20    No caffeine, decaf, tea, or chocolate after 8pm tonight     NPO after midnight tonight.

## 2024-11-19 NOTE — PROGRESS NOTES
Medication Reconciliation    List of medications patient is currently taking is complete.     Source of information: 1. Conversation with patient at bedside                                      2. EPIC records      Notes regarding home medications:   1. Patient did not take any of his home medications prior to arrival to the emergency department today.    Gómez Tovar RP, PharmD, BCPS  11/19/2024 11:58 AM

## 2024-11-19 NOTE — PROGRESS NOTES
Admitted patient to room 4105 from ED. Patient is alert and oriented x4, oriented to room, bed in low position, bed wheels locked, and call light within reach. Electronically signed by John Benton RN on 11/19/2024 at 4:42 PM

## 2024-11-19 NOTE — CARE COORDINATION
Case Management Assessment  Initial Evaluation    Date/Time of Evaluation: 11/19/2024 12:45 PM  Assessment Completed by: DEEPAK Armijo    If patient is discharged prior to next notation, then this note serves as note for discharge by case management.    Patient Name: Gregorio Coombs Sr.                   YOB: 1960  Diagnosis: No admission diagnoses are documented for this encounter.                   Date / Time: 11/19/2024  8:03 AM    Patient Admission Status: Emergency   Readmission Risk (Low < 19, Mod (19-27), High > 27): No data recorded  Current PCP: Olena Morrissey APRN - CNP  PCP verified by CM? Yes    Chart Reviewed: Yes      History Provided by: Patient  Patient Orientation: Alert and Oriented    Patient Cognition: Alert    Hospitalization in the last 30 days (Readmission):  No    If yes, Readmission Assessment in CM Navigator will be completed.    Advance Directives:      Code Status: Prior   Patient's Primary Decision Maker is: Legal Next of Kin    Primary Decision Maker: Gregorio Coombs Jr - Child - [phone]    Discharge Planning:    Patient lives with: Alone Type of Home: Shelter  Primary Care Giver: Self  Patient Support Systems include: Spouse/Significant Other, Children   Current Financial resources: Medicaid, Medicare  Current community resources: Other (Comment) ([facility] [facility] - referral)  Current services prior to admission: None            Current DME:              Type of Home Care services:  None    ADLS  Prior functional level: Independent in ADLs/IADLs  Current functional level: Independent in ADLs/IADLs    PT AM-PAC:   /24  OT AM-PAC:   /24    Family can provide assistance at DC: Yes  Would you like Case Management to discuss the discharge plan with any other family members/significant others, and if so, who? No  Plans to Return to Present Housing: Unknown at present  Other Identified Issues/Barriers to RETURNING to current housing: medical

## 2024-11-20 ENCOUNTER — APPOINTMENT (OUTPATIENT)
Dept: NUCLEAR MEDICINE | Age: 64
DRG: 948 | End: 2024-11-20
Payer: MEDICARE

## 2024-11-20 ENCOUNTER — HOSPITAL ENCOUNTER (INPATIENT)
Age: 64
Discharge: HOME | DRG: 948 | End: 2024-11-22
Payer: MEDICARE

## 2024-11-20 VITALS — HEIGHT: 66 IN | WEIGHT: 153.88 LBS | BODY MASS INDEX: 24.73 KG/M2

## 2024-11-20 VITALS
HEART RATE: 65 BPM | BODY MASS INDEX: 24.73 KG/M2 | RESPIRATION RATE: 20 BRPM | DIASTOLIC BLOOD PRESSURE: 78 MMHG | WEIGHT: 153.88 LBS | SYSTOLIC BLOOD PRESSURE: 149 MMHG | TEMPERATURE: 97.8 F | OXYGEN SATURATION: 97 % | HEIGHT: 66 IN

## 2024-11-20 LAB
ANION GAP SERPL CALCULATED.3IONS-SCNC: 11 MMOL/L (ref 3–16)
BASOPHILS # BLD: 0 K/UL (ref 0–0.2)
BASOPHILS NFR BLD: 0.9 %
BUN SERPL-MCNC: 14 MG/DL (ref 7–20)
CALCIUM SERPL-MCNC: 9 MG/DL (ref 8.3–10.6)
CHLORIDE SERPL-SCNC: 105 MMOL/L (ref 99–110)
CO2 SERPL-SCNC: 20 MMOL/L (ref 21–32)
CREAT SERPL-MCNC: 0.8 MG/DL (ref 0.8–1.3)
DEPRECATED RDW RBC AUTO: 14.9 % (ref 12.4–15.4)
ECHO BSA: 1.8 M2
EOSINOPHIL # BLD: 0.2 K/UL (ref 0–0.6)
EOSINOPHIL NFR BLD: 4.6 %
GFR SERPLBLD CREATININE-BSD FMLA CKD-EPI: >90 ML/MIN/{1.73_M2}
GLUCOSE SERPL-MCNC: 88 MG/DL (ref 70–99)
HCT VFR BLD AUTO: 40.6 % (ref 40.5–52.5)
HGB BLD-MCNC: 14 G/DL (ref 13.5–17.5)
LYMPHOCYTES # BLD: 1.9 K/UL (ref 1–5.1)
LYMPHOCYTES NFR BLD: 36.5 %
MCH RBC QN AUTO: 33.6 PG (ref 26–34)
MCHC RBC AUTO-ENTMCNC: 34.4 G/DL (ref 31–36)
MCV RBC AUTO: 97.6 FL (ref 80–100)
MONOCYTES # BLD: 0.8 K/UL (ref 0–1.3)
MONOCYTES NFR BLD: 15.1 %
NEUTROPHILS # BLD: 2.2 K/UL (ref 1.7–7.7)
NEUTROPHILS NFR BLD: 42.9 %
NUC REST DIASTOLIC VOLUME INDEX: 73 ML/M2
NUC REST EJECTION FRACTION: 79 %
NUC REST SYSTOLIC VOLUME INDEX: 15 ML/M2
PLATELET # BLD AUTO: 265 K/UL (ref 135–450)
PMV BLD AUTO: 7.9 FL (ref 5–10.5)
POTASSIUM SERPL-SCNC: 4.1 MMOL/L (ref 3.5–5.1)
RBC # BLD AUTO: 4.15 M/UL (ref 4.2–5.9)
SODIUM SERPL-SCNC: 136 MMOL/L (ref 136–145)
STRESS BASELINE DIAS BP: 100 MMHG
STRESS BASELINE HR: 67 BPM
STRESS BASELINE SYS BP: 153 MMHG
STRESS ESTIMATED WORKLOAD: 1 METS
STRESS EXERCISE DUR MIN: 1 MIN
STRESS EXERCISE DUR SEC: 40 SEC
STRESS O2 SAT PEAK: 99 %
STRESS O2 SAT REST: 99 %
STRESS PEAK DIAS BP: 60 MMHG
STRESS PEAK SYS BP: 167 MMHG
STRESS PERCENT HR ACHIEVED: 73 %
STRESS POST PEAK HR: 114 BPM
STRESS RATE PRESSURE PRODUCT: NORMAL BPM*MMHG
STRESS TARGET HR: 156 BPM
WBC # BLD AUTO: 5.2 K/UL (ref 4–11)

## 2024-11-20 PROCEDURE — 80048 BASIC METABOLIC PNL TOTAL CA: CPT

## 2024-11-20 PROCEDURE — 85025 COMPLETE CBC W/AUTO DIFF WBC: CPT

## 2024-11-20 PROCEDURE — 36415 COLL VENOUS BLD VENIPUNCTURE: CPT

## 2024-11-20 PROCEDURE — 93018 CV STRESS TEST I&R ONLY: CPT | Performed by: INTERNAL MEDICINE

## 2024-11-20 PROCEDURE — 6370000000 HC RX 637 (ALT 250 FOR IP): Performed by: FAMILY MEDICINE

## 2024-11-20 PROCEDURE — 93016 CV STRESS TEST SUPVJ ONLY: CPT | Performed by: INTERNAL MEDICINE

## 2024-11-20 PROCEDURE — A9502 TC99M TETROFOSMIN: HCPCS | Performed by: STUDENT IN AN ORGANIZED HEALTH CARE EDUCATION/TRAINING PROGRAM

## 2024-11-20 PROCEDURE — 3430000000 HC RX DIAGNOSTIC RADIOPHARMACEUTICAL: Performed by: STUDENT IN AN ORGANIZED HEALTH CARE EDUCATION/TRAINING PROGRAM

## 2024-11-20 PROCEDURE — 78452 HT MUSCLE IMAGE SPECT MULT: CPT

## 2024-11-20 PROCEDURE — 94760 N-INVAS EAR/PLS OXIMETRY 1: CPT

## 2024-11-20 PROCEDURE — 6360000002 HC RX W HCPCS: Performed by: STUDENT IN AN ORGANIZED HEALTH CARE EDUCATION/TRAINING PROGRAM

## 2024-11-20 PROCEDURE — 78452 HT MUSCLE IMAGE SPECT MULT: CPT | Performed by: INTERNAL MEDICINE

## 2024-11-20 PROCEDURE — 6360000002 HC RX W HCPCS: Performed by: FAMILY MEDICINE

## 2024-11-20 PROCEDURE — 6370000000 HC RX 637 (ALT 250 FOR IP): Performed by: HOSPITALIST

## 2024-11-20 PROCEDURE — 93017 CV STRESS TEST TRACING ONLY: CPT

## 2024-11-20 PROCEDURE — 2580000003 HC RX 258: Performed by: FAMILY MEDICINE

## 2024-11-20 RX ORDER — TRAMADOL HYDROCHLORIDE 50 MG/1
50 TABLET ORAL ONCE
Status: COMPLETED | OUTPATIENT
Start: 2024-11-20 | End: 2024-11-20

## 2024-11-20 RX ORDER — METAXALONE 800 MG/1
800 TABLET ORAL 3 TIMES DAILY
Qty: 30 TABLET | Refills: 0 | Status: SHIPPED | OUTPATIENT
Start: 2024-11-20 | End: 2024-11-20 | Stop reason: HOSPADM

## 2024-11-20 RX ORDER — KETOROLAC TROMETHAMINE 15 MG/ML
15 INJECTION, SOLUTION INTRAMUSCULAR; INTRAVENOUS ONCE
Status: COMPLETED | OUTPATIENT
Start: 2024-11-20 | End: 2024-11-20

## 2024-11-20 RX ORDER — CYCLOBENZAPRINE HCL 10 MG
10 TABLET ORAL 3 TIMES DAILY PRN
Qty: 21 TABLET | Refills: 0 | Status: SHIPPED | OUTPATIENT
Start: 2024-11-20 | End: 2024-11-30

## 2024-11-20 RX ORDER — REGADENOSON 0.08 MG/ML
0.4 INJECTION, SOLUTION INTRAVENOUS
Status: COMPLETED | OUTPATIENT
Start: 2024-11-20 | End: 2024-11-20

## 2024-11-20 RX ORDER — LIDOCAINE 4 G/G
1 PATCH TOPICAL DAILY
Qty: 7 EACH | Refills: 0 | Status: SHIPPED | OUTPATIENT
Start: 2024-11-21 | End: 2024-11-28

## 2024-11-20 RX ADMIN — METAXALONE 800 MG: 800 TABLET ORAL at 13:37

## 2024-11-20 RX ADMIN — REGADENOSON 0.4 MG: 0.08 INJECTION, SOLUTION INTRAVENOUS at 08:02

## 2024-11-20 RX ADMIN — TRAMADOL HYDROCHLORIDE 50 MG: 50 TABLET, COATED ORAL at 06:49

## 2024-11-20 RX ADMIN — KETOROLAC TROMETHAMINE 15 MG: 15 INJECTION, SOLUTION INTRAMUSCULAR; INTRAVENOUS at 09:54

## 2024-11-20 RX ADMIN — SODIUM CHLORIDE, PRESERVATIVE FREE 10 ML: 5 INJECTION INTRAVENOUS at 09:36

## 2024-11-20 RX ADMIN — TETROFOSMIN 32.3 MILLICURIE: 1.38 INJECTION, POWDER, LYOPHILIZED, FOR SOLUTION INTRAVENOUS at 08:08

## 2024-11-20 RX ADMIN — ASPIRIN 81 MG: 81 TABLET, CHEWABLE ORAL at 09:35

## 2024-11-20 RX ADMIN — ACETAMINOPHEN 325MG 650 MG: 325 TABLET ORAL at 05:37

## 2024-11-20 RX ADMIN — METAXALONE 800 MG: 800 TABLET ORAL at 09:35

## 2024-11-20 RX ADMIN — TETROFOSMIN 14.5 MILLICURIE: 1.38 INJECTION, POWDER, LYOPHILIZED, FOR SOLUTION INTRAVENOUS at 06:45

## 2024-11-20 RX ADMIN — LISINOPRIL 2.5 MG: 5 TABLET ORAL at 09:35

## 2024-11-20 ASSESSMENT — PAIN DESCRIPTION - LOCATION
LOCATION: BACK;CHEST
LOCATION: CHEST;BACK
LOCATION: BACK

## 2024-11-20 ASSESSMENT — PAIN SCALES - GENERAL
PAINLEVEL_OUTOF10: 8
PAINLEVEL_OUTOF10: 7
PAINLEVEL_OUTOF10: 8
PAINLEVEL_OUTOF10: 9

## 2024-11-20 ASSESSMENT — PAIN DESCRIPTION - ORIENTATION: ORIENTATION: MID

## 2024-11-20 ASSESSMENT — PAIN DESCRIPTION - DESCRIPTORS
DESCRIPTORS: ACHING;DISCOMFORT
DESCRIPTORS: ACHING
DESCRIPTORS: ACHING

## 2024-11-20 ASSESSMENT — PAIN DESCRIPTION - FREQUENCY: FREQUENCY: INTERMITTENT

## 2024-11-20 ASSESSMENT — PAIN - FUNCTIONAL ASSESSMENT: PAIN_FUNCTIONAL_ASSESSMENT: ACTIVITIES ARE NOT PREVENTED

## 2024-11-20 ASSESSMENT — PAIN DESCRIPTION - ONSET: ONSET: ON-GOING

## 2024-11-20 NOTE — PLAN OF CARE
Problem: Discharge Planning  Goal: Discharge to home or other facility with appropriate resources  11/20/2024 1014 by Savannah Tatum RN  Outcome: Progressing  11/19/2024 2304 by Roslyn Schroeder RN  Outcome: Progressing  Flowsheets (Taken 11/19/2024 2119)  Discharge to home or other facility with appropriate resources:   Identify barriers to discharge with patient and caregiver   Arrange for needed discharge resources and transportation as appropriate     Problem: Pain  Goal: Verbalizes/displays adequate comfort level or baseline comfort level  11/20/2024 1014 by Savannah Tatum RN  Outcome: Progressing  11/19/2024 2304 by Roslyn Schroeder RN  Outcome: Progressing     Problem: Cardiovascular - Adult  Goal: Maintains optimal cardiac output and hemodynamic stability  11/20/2024 1014 by Savannah Tatum RN  Outcome: Progressing  11/19/2024 2304 by Roslyn Schroeder RN  Outcome: Progressing  Flowsheets (Taken 11/19/2024 2119)  Maintains optimal cardiac output and hemodynamic stability:   Monitor blood pressure and heart rate   Assess for signs of decreased cardiac output   Administer vasoactive medications as ordered  Goal: Absence of cardiac dysrhythmias or at baseline  11/20/2024 1014 by Savannah Tatum RN  Outcome: Progressing  11/19/2024 2304 by Roslyn Schroeder RN  Outcome: Progressing  Flowsheets (Taken 11/19/2024 2119)  Absence of cardiac dysrhythmias or at baseline:   Monitor cardiac rate and rhythm   Assess for signs of decreased cardiac output   Administer antiarrhythmia medication and electrolyte replacement as ordered     Problem: Skin/Tissue Integrity - Adult  Goal: Skin integrity remains intact  11/20/2024 1014 by Savannah Tatum RN  Outcome: Progressing  11/19/2024 2304 by Roslyn Schroeder RN  Outcome: Progressing  Flowsheets (Taken 11/19/2024 2119)  Skin Integrity Remains Intact:   Monitor for areas of redness and/or skin breakdown   Assess vascular access sites hourly  Goal: Incisions,

## 2024-11-20 NOTE — CARE COORDINATION
Discharge Planning:    Per chart review, patient to hospital from [facility] on [address]. [facility] provided to patient yesterday for his review.    This RN CM to bedside to discuss/confirm discharge plan with patient. Patient confirms at this time that his goal is to return to the shelter house on [address]. He states that he still has the information on the Respite Center and will reach out to them, if needed. [facility] staff to provide transportation this afternoon. Patient confirmed he will arrange transport. No other dc need identified at this time. CM to continue to follow for updates.    Electronically signed by GUILLERMO HOOPER RN on 11/20/2024 at 12:12 PM

## 2024-11-20 NOTE — FLOWSHEET NOTE
Becca Long MD notified of patient request for additional dose of ultram for back and chest pain.  New order obtained.

## 2024-11-20 NOTE — PLAN OF CARE
Problem: Discharge Planning  Goal: Discharge to home or other facility with appropriate resources  Outcome: Progressing  Flowsheets (Taken 11/19/2024 2119)  Discharge to home or other facility with appropriate resources:   Identify barriers to discharge with patient and caregiver   Arrange for needed discharge resources and transportation as appropriate     Problem: Pain  Goal: Verbalizes/displays adequate comfort level or baseline comfort level  Outcome: Progressing     Problem: Cardiovascular - Adult  Goal: Maintains optimal cardiac output and hemodynamic stability  Outcome: Progressing  Flowsheets (Taken 11/19/2024 2119)  Maintains optimal cardiac output and hemodynamic stability:   Monitor blood pressure and heart rate   Assess for signs of decreased cardiac output   Administer vasoactive medications as ordered  Goal: Absence of cardiac dysrhythmias or at baseline  Outcome: Progressing  Flowsheets (Taken 11/19/2024 2119)  Absence of cardiac dysrhythmias or at baseline:   Monitor cardiac rate and rhythm   Assess for signs of decreased cardiac output   Administer antiarrhythmia medication and electrolyte replacement as ordered     Problem: Skin/Tissue Integrity - Adult  Goal: Skin integrity remains intact  Outcome: Progressing  Flowsheets (Taken 11/19/2024 2119)  Skin Integrity Remains Intact:   Monitor for areas of redness and/or skin breakdown   Assess vascular access sites hourly  Goal: Incisions, wounds, or drain sites healing without S/S of infection  Outcome: Progressing  Flowsheets (Taken 11/19/2024 2119)  Incisions, Wounds, or Drain Sites Healing Without Sign and Symptoms of Infection: TWICE DAILY: Assess and document skin integrity

## 2024-11-20 NOTE — PROGRESS NOTES
CLINICAL PHARMACY NOTE: MEDS TO BEDS    Total # of Prescriptions Filled: 2   The following medications were delivered to the patient:  Current Discharge Medication List        START taking these medications    Details   lidocaine 4 % external patch Place 1 patch onto the skin daily for 7 days  Qty: 7 each, Refills: 0      cyclobenzaprine (FLEXERIL) 10 MG tablet Take 1 tablet by mouth 3 times daily as needed for Muscle spasms  Qty: 21 tablet, Refills: 0               Additional Documentation:

## 2024-11-20 NOTE — PROGRESS NOTES
IV removed no complications. Discharge paperwork reviewed. No questions or concerns. Pt wheeled down to personal ordered uber.     Electronically signed by Savannah Tatum RN on 11/20/2024 at 1:46 PM

## 2024-11-20 NOTE — DISCHARGE INSTR - DIET
Good nutrition is important when healing from an illness, injury, or surgery.  Follow any nutrition recommendations given to you during your hospital stay.   If you were given an oral nutrition supplement while in the hospital, continue to take this supplement at home.  You can take it with meals, in-between meals, and/or before bedtime. These supplements can be purchased at most local grocery stores, pharmacies, and chain [employer]stores.   If you have any questions about your diet or nutrition, call the hospital and ask for the dietitian.  general

## 2024-11-20 NOTE — PROGRESS NOTES
Pt AO x4. VSS. Pt has 9/10 chest pain scheduled dose given, MD messaged to obtain. Pt went down for a stress test, returns no issues. Morning medications given. Morning assessment completed. No questions or concerns. Call light within reach, bed in lowest and locked position.     Electronically signed by Savannah Tatum RN on 11/20/2024 at 10:18 AM

## 2024-11-21 ENCOUNTER — TELEPHONE (OUTPATIENT)
Dept: FAMILY MEDICINE CLINIC | Age: 64
End: 2024-11-21

## 2024-11-21 NOTE — TELEPHONE ENCOUNTER
Please make 2 more attempts to reach pt for [facility] appt  Has not been seen since 2022 and will likely need assistance with transportation.     I lvm for him today   Thank you!

## 2024-11-21 NOTE — TELEPHONE ENCOUNTER
Care Transitions Initial Follow Up Call    Outreach made within 2 business days of discharge: Yes    Patient: Gregorio Coombs Sr. Patient : 1960   MRN: [id number]  Reason for Admission: chest pain  Discharge Date: 24       Spoke with: will    Discharge department/facility: [facility]    Adams has not been seen by pcp since . Lvm asking that he call back for appt. Will likely need help w transportation and office can assist with this    Scheduled appointment with PCP within 7-14 days    Follow Up  No future appointments.    Kristine Dubose MA

## 2024-12-04 NOTE — DISCHARGE SUMMARY
V2.0  Discharge Summary    Name:  Gregorio Coombs Sr. /Age/Sex: 1960 (64 y.o. male)   Admit Date: 2024  Discharge Date: 24    MRN & CSN:  [id number] & [id number] Encounter Date and Time 24 7:29 PM EST    Attending:  No att. providers found Discharging Provider: Mira Corral MD       Hospital Course:     Elevated troponin:  - cardiology consulted:  does not need heparin drip as elevation is mild but warrents stress test  - nuclear stress test performed and reassuring     Chest pain:  - is reproducible with pressure, suspect MSK pain  - muscle relaxer, lidocaine patch     HTN:  - cont home lisinopril, amlodipine     Insomnia:  - cont home trazodone    The patient expressed appropriate understanding of, and agreement with the discharge recommendations, medications, and plan.     Consults this admission:  IP CONSULT TO HOSPITALIST  IP CONSULT TO CARDIOLOGY    Discharge Diagnosis:   Elevated troponin        Discharge Instruction:   Follow up appointments: PCP  Primary care physician: Olena Morrissey, APRN - CNP within 1 week  Diet: cardiac diet   Activity: activity as tolerated  Disposition: Discharged to:   [x]Home, [][facility], []SNF, []Acute Rehab, []Hospice   Condition on discharge: Stable  Labs and Tests to be Followed up as an outpatient by PCP or Specialist: none pending    Discharge Medications:        Medication List        CONTINUE taking these medications      aspirin 81 MG chewable tablet  Take 1 tablet by mouth daily     atorvastatin 20 MG tablet  Commonly known as: LIPITOR  Take 1 tablet by mouth nightly     fluticasone-salmeterol 100-50 MCG/ACT Aepb diskus inhaler  Commonly known as: ADVAIR     lisinopril 2.5 MG tablet  Commonly known as: PRINIVIL;ZESTRIL     naproxen 500 MG tablet  Commonly known as: NAPROSYN     traZODone 50 MG tablet  Commonly known as: DESYREL  Take 1 tablet by mouth nightly as needed for Sleep     zolpidem 5 MG tablet  Commonly known as: AMBIEN

## 2024-12-06 ENCOUNTER — APPOINTMENT (OUTPATIENT)
Dept: CT IMAGING | Age: 64
DRG: 057 | End: 2024-12-06
Payer: MEDICARE

## 2024-12-06 ENCOUNTER — HOSPITAL ENCOUNTER (INPATIENT)
Age: 64
LOS: 6 days | Discharge: NURSING, REHAB OR LONG-TERM CARE | DRG: 057 | End: 2024-12-12
Attending: EMERGENCY MEDICINE | Admitting: INTERNAL MEDICINE
Payer: MEDICARE

## 2024-12-06 DIAGNOSIS — I48.91 NEW ONSET A-FIB (HCC): ICD-10-CM

## 2024-12-06 DIAGNOSIS — Q21.12 PFO (PATENT FORAMEN OVALE): ICD-10-CM

## 2024-12-06 DIAGNOSIS — I48.91 ATRIAL FIBRILLATION, UNSPECIFIED TYPE (HCC): Primary | ICD-10-CM

## 2024-12-06 DIAGNOSIS — G81.91 RIGHT HEMIPARESIS (HCC): ICD-10-CM

## 2024-12-06 LAB
AMPHETAMINES UR QL SCN>1000 NG/ML: ABNORMAL
ANION GAP SERPL CALCULATED.3IONS-SCNC: 13 MMOL/L (ref 3–16)
BARBITURATES UR QL SCN>200 NG/ML: ABNORMAL
BASOPHILS # BLD: 0 K/UL (ref 0–0.2)
BASOPHILS NFR BLD: 0.5 %
BENZODIAZ UR QL SCN>200 NG/ML: ABNORMAL
BILIRUB UR QL STRIP.AUTO: NEGATIVE
BUN SERPL-MCNC: 9 MG/DL (ref 7–20)
CALCIUM SERPL-MCNC: 9 MG/DL (ref 8.3–10.6)
CANNABINOIDS UR QL SCN>50 NG/ML: POSITIVE
CHLORIDE SERPL-SCNC: 103 MMOL/L (ref 99–110)
CLARITY UR: CLEAR
CO2 SERPL-SCNC: 19 MMOL/L (ref 21–32)
COCAINE UR QL SCN: ABNORMAL
COLOR UR: YELLOW
CREAT SERPL-MCNC: 0.8 MG/DL (ref 0.8–1.3)
DEPRECATED RDW RBC AUTO: 15.7 % (ref 12.4–15.4)
DRUG SCREEN COMMENT UR-IMP: ABNORMAL
EKG DIAGNOSIS: NORMAL
EKG Q-T INTERVAL: 308 MS
EKG QRS DURATION: 94 MS
EKG QTC CALCULATION (BAZETT): 458 MS
EKG R AXIS: 125 DEGREES
EKG T AXIS: 8 DEGREES
EKG VENTRICULAR RATE: 133 BPM
EOSINOPHIL # BLD: 0.2 K/UL (ref 0–0.6)
EOSINOPHIL NFR BLD: 2.8 %
ETHANOLAMINE SERPL-MCNC: 12 MG/DL (ref 0–0.08)
FENTANYL SCREEN, URINE: ABNORMAL
GFR SERPLBLD CREATININE-BSD FMLA CKD-EPI: >90 ML/MIN/{1.73_M2}
GLUCOSE SERPL-MCNC: 111 MG/DL (ref 70–99)
GLUCOSE UR STRIP.AUTO-MCNC: NEGATIVE MG/DL
HCT VFR BLD AUTO: 42 % (ref 40.5–52.5)
HGB BLD-MCNC: 14.3 G/DL (ref 13.5–17.5)
HGB UR QL STRIP.AUTO: NEGATIVE
INR PPP: 1.02 (ref 0.85–1.15)
KETONES UR STRIP.AUTO-MCNC: NEGATIVE MG/DL
LEUKOCYTE ESTERASE UR QL STRIP.AUTO: NEGATIVE
LYMPHOCYTES # BLD: 2.6 K/UL (ref 1–5.1)
LYMPHOCYTES NFR BLD: 43.9 %
MCH RBC QN AUTO: 33.2 PG (ref 26–34)
MCHC RBC AUTO-ENTMCNC: 34 G/DL (ref 31–36)
MCV RBC AUTO: 97.5 FL (ref 80–100)
METHADONE UR QL SCN>300 NG/ML: ABNORMAL
MONOCYTES # BLD: 0.5 K/UL (ref 0–1.3)
MONOCYTES NFR BLD: 8.6 %
NEUTROPHILS # BLD: 2.6 K/UL (ref 1.7–7.7)
NEUTROPHILS NFR BLD: 44.2 %
NITRITE UR QL STRIP.AUTO: NEGATIVE
NT-PROBNP SERPL-MCNC: <36 PG/ML (ref 0–124)
OPIATES UR QL SCN>300 NG/ML: ABNORMAL
OXYCODONE UR QL SCN: ABNORMAL
PCP UR QL SCN>25 NG/ML: ABNORMAL
PH UR STRIP.AUTO: 7.5 [PH] (ref 5–8)
PH UR STRIP: 6 [PH]
PLATELET # BLD AUTO: 384 K/UL (ref 135–450)
PMV BLD AUTO: 8.9 FL (ref 5–10.5)
POTASSIUM SERPL-SCNC: 3.9 MMOL/L (ref 3.5–5.1)
POTASSIUM SERPL-SCNC: ABNORMAL MMOL/L (ref 3.5–5.1)
PROT UR STRIP.AUTO-MCNC: NEGATIVE MG/DL
PROTHROMBIN TIME: 13.7 SEC (ref 11.9–14.9)
RBC # BLD AUTO: 4.3 M/UL (ref 4.2–5.9)
SODIUM SERPL-SCNC: 135 MMOL/L (ref 136–145)
SP GR UR STRIP.AUTO: 1.01 (ref 1–1.03)
TROPONIN, HIGH SENSITIVITY: 19 NG/L (ref 0–22)
TROPONIN, HIGH SENSITIVITY: 25 NG/L (ref 0–22)
UA COMPLETE W REFLEX CULTURE PNL UR: NORMAL
UA DIPSTICK W REFLEX MICRO PNL UR: NORMAL
URN SPEC COLLECT METH UR: NORMAL
UROBILINOGEN UR STRIP-ACNC: 0.2 E.U./DL
WBC # BLD AUTO: 5.9 K/UL (ref 4–11)

## 2024-12-06 PROCEDURE — 84132 ASSAY OF SERUM POTASSIUM: CPT

## 2024-12-06 PROCEDURE — 94761 N-INVAS EAR/PLS OXIMETRY MLT: CPT

## 2024-12-06 PROCEDURE — 81003 URINALYSIS AUTO W/O SCOPE: CPT

## 2024-12-06 PROCEDURE — 83880 ASSAY OF NATRIURETIC PEPTIDE: CPT

## 2024-12-06 PROCEDURE — 84484 ASSAY OF TROPONIN QUANT: CPT

## 2024-12-06 PROCEDURE — 80048 BASIC METABOLIC PNL TOTAL CA: CPT

## 2024-12-06 PROCEDURE — 6360000004 HC RX CONTRAST MEDICATION

## 2024-12-06 PROCEDURE — 70450 CT HEAD/BRAIN W/O DYE: CPT

## 2024-12-06 PROCEDURE — 2060000000 HC ICU INTERMEDIATE R&B

## 2024-12-06 PROCEDURE — 6360000002 HC RX W HCPCS: Performed by: INTERNAL MEDICINE

## 2024-12-06 PROCEDURE — 82077 ASSAY SPEC XCP UR&BREATH IA: CPT

## 2024-12-06 PROCEDURE — 36415 COLL VENOUS BLD VENIPUNCTURE: CPT

## 2024-12-06 PROCEDURE — 80307 DRUG TEST PRSMV CHEM ANLYZR: CPT

## 2024-12-06 PROCEDURE — 99285 EMERGENCY DEPT VISIT HI MDM: CPT

## 2024-12-06 PROCEDURE — 93005 ELECTROCARDIOGRAM TRACING: CPT | Performed by: EMERGENCY MEDICINE

## 2024-12-06 PROCEDURE — 71275 CT ANGIOGRAPHY CHEST: CPT

## 2024-12-06 PROCEDURE — 94640 AIRWAY INHALATION TREATMENT: CPT

## 2024-12-06 PROCEDURE — 2500000003 HC RX 250 WO HCPCS

## 2024-12-06 PROCEDURE — 85025 COMPLETE CBC W/AUTO DIFF WBC: CPT

## 2024-12-06 PROCEDURE — 2580000003 HC RX 258: Performed by: INTERNAL MEDICINE

## 2024-12-06 PROCEDURE — 85610 PROTHROMBIN TIME: CPT

## 2024-12-06 PROCEDURE — 96374 THER/PROPH/DIAG INJ IV PUSH: CPT

## 2024-12-06 PROCEDURE — 6370000000 HC RX 637 (ALT 250 FOR IP): Performed by: INTERNAL MEDICINE

## 2024-12-06 RX ORDER — METHOCARBAMOL 500 MG/1
500 TABLET, FILM COATED ORAL 4 TIMES DAILY
Status: DISCONTINUED | OUTPATIENT
Start: 2024-12-06 | End: 2024-12-12 | Stop reason: HOSPADM

## 2024-12-06 RX ORDER — TRAZODONE HYDROCHLORIDE 50 MG/1
50 TABLET, FILM COATED ORAL NIGHTLY PRN
Status: DISCONTINUED | OUTPATIENT
Start: 2024-12-06 | End: 2024-12-12 | Stop reason: HOSPADM

## 2024-12-06 RX ORDER — MAGNESIUM SULFATE IN WATER 40 MG/ML
2000 INJECTION, SOLUTION INTRAVENOUS PRN
Status: DISCONTINUED | OUTPATIENT
Start: 2024-12-06 | End: 2024-12-12 | Stop reason: HOSPADM

## 2024-12-06 RX ORDER — DILTIAZEM HYDROCHLORIDE 5 MG/ML
0.25 INJECTION INTRAVENOUS ONCE
Status: COMPLETED | OUTPATIENT
Start: 2024-12-06 | End: 2024-12-06

## 2024-12-06 RX ORDER — OXYCODONE HYDROCHLORIDE 5 MG/1
5 TABLET ORAL EVERY 4 HOURS PRN
Status: COMPLETED | OUTPATIENT
Start: 2024-12-06 | End: 2024-12-08

## 2024-12-06 RX ORDER — ATORVASTATIN CALCIUM 40 MG/1
40 TABLET, FILM COATED ORAL NIGHTLY
Status: DISCONTINUED | OUTPATIENT
Start: 2024-12-06 | End: 2024-12-12 | Stop reason: HOSPADM

## 2024-12-06 RX ORDER — ONDANSETRON 4 MG/1
4 TABLET, ORALLY DISINTEGRATING ORAL EVERY 8 HOURS PRN
Status: DISCONTINUED | OUTPATIENT
Start: 2024-12-06 | End: 2024-12-12 | Stop reason: HOSPADM

## 2024-12-06 RX ORDER — LIDOCAINE 4 G/G
1 PATCH TOPICAL DAILY
Status: DISCONTINUED | OUTPATIENT
Start: 2024-12-06 | End: 2024-12-12 | Stop reason: HOSPADM

## 2024-12-06 RX ORDER — ASPIRIN 81 MG/1
81 TABLET, CHEWABLE ORAL DAILY
Status: DISCONTINUED | OUTPATIENT
Start: 2024-12-07 | End: 2024-12-12 | Stop reason: HOSPADM

## 2024-12-06 RX ORDER — HEPARIN SODIUM 1000 [USP'U]/ML
4000 INJECTION, SOLUTION INTRAVENOUS; SUBCUTANEOUS ONCE
Status: DISCONTINUED | OUTPATIENT
Start: 2024-12-06 | End: 2024-12-06

## 2024-12-06 RX ORDER — LISINOPRIL 2.5 MG/1
2.5 TABLET ORAL DAILY
Status: DISCONTINUED | OUTPATIENT
Start: 2024-12-07 | End: 2024-12-12 | Stop reason: HOSPADM

## 2024-12-06 RX ORDER — ONDANSETRON 2 MG/ML
4 INJECTION INTRAMUSCULAR; INTRAVENOUS EVERY 6 HOURS PRN
Status: DISCONTINUED | OUTPATIENT
Start: 2024-12-06 | End: 2024-12-12 | Stop reason: HOSPADM

## 2024-12-06 RX ORDER — SODIUM CHLORIDE 0.9 % (FLUSH) 0.9 %
5-40 SYRINGE (ML) INJECTION EVERY 12 HOURS SCHEDULED
Status: DISCONTINUED | OUTPATIENT
Start: 2024-12-06 | End: 2024-12-12 | Stop reason: HOSPADM

## 2024-12-06 RX ORDER — DILTIAZEM HYDROCHLORIDE 30 MG/1
30 TABLET, FILM COATED ORAL EVERY 6 HOURS SCHEDULED
Status: DISCONTINUED | OUTPATIENT
Start: 2024-12-07 | End: 2024-12-07

## 2024-12-06 RX ORDER — SODIUM CHLORIDE 9 MG/ML
INJECTION, SOLUTION INTRAVENOUS PRN
Status: DISCONTINUED | OUTPATIENT
Start: 2024-12-06 | End: 2024-12-12 | Stop reason: HOSPADM

## 2024-12-06 RX ORDER — ENOXAPARIN SODIUM 100 MG/ML
40 INJECTION SUBCUTANEOUS DAILY
Status: CANCELLED | OUTPATIENT
Start: 2024-12-07

## 2024-12-06 RX ORDER — HEPARIN SODIUM 1000 [USP'U]/ML
4000 INJECTION, SOLUTION INTRAVENOUS; SUBCUTANEOUS PRN
Status: DISCONTINUED | OUTPATIENT
Start: 2024-12-06 | End: 2024-12-06

## 2024-12-06 RX ORDER — POTASSIUM CHLORIDE 7.45 MG/ML
10 INJECTION INTRAVENOUS PRN
Status: ACTIVE | OUTPATIENT
Start: 2024-12-06 | End: 2024-12-07

## 2024-12-06 RX ORDER — POTASSIUM CHLORIDE 1500 MG/1
40 TABLET, EXTENDED RELEASE ORAL PRN
Status: ACTIVE | OUTPATIENT
Start: 2024-12-06 | End: 2024-12-07

## 2024-12-06 RX ORDER — ALBUTEROL SULFATE 90 UG/1
2 INHALANT RESPIRATORY (INHALATION) EVERY 8 HOURS PRN
COMMUNITY

## 2024-12-06 RX ORDER — SODIUM CHLORIDE 0.9 % (FLUSH) 0.9 %
5-40 SYRINGE (ML) INJECTION PRN
Status: DISCONTINUED | OUTPATIENT
Start: 2024-12-06 | End: 2024-12-12 | Stop reason: HOSPADM

## 2024-12-06 RX ORDER — IOPAMIDOL 755 MG/ML
75 INJECTION, SOLUTION INTRAVASCULAR
Status: COMPLETED | OUTPATIENT
Start: 2024-12-06 | End: 2024-12-06

## 2024-12-06 RX ORDER — HEPARIN SODIUM 10000 [USP'U]/100ML
5-30 INJECTION, SOLUTION INTRAVENOUS CONTINUOUS
Status: DISCONTINUED | OUTPATIENT
Start: 2024-12-06 | End: 2024-12-06

## 2024-12-06 RX ORDER — ACETAMINOPHEN 325 MG/1
650 TABLET ORAL EVERY 6 HOURS PRN
Status: DISCONTINUED | OUTPATIENT
Start: 2024-12-06 | End: 2024-12-12 | Stop reason: HOSPADM

## 2024-12-06 RX ORDER — POLYETHYLENE GLYCOL 3350 17 G/17G
17 POWDER, FOR SOLUTION ORAL DAILY PRN
Status: DISCONTINUED | OUTPATIENT
Start: 2024-12-06 | End: 2024-12-12 | Stop reason: HOSPADM

## 2024-12-06 RX ORDER — HEPARIN SODIUM 1000 [USP'U]/ML
2000 INJECTION, SOLUTION INTRAVENOUS; SUBCUTANEOUS PRN
Status: DISCONTINUED | OUTPATIENT
Start: 2024-12-06 | End: 2024-12-06

## 2024-12-06 RX ORDER — ACETAMINOPHEN 650 MG/1
650 SUPPOSITORY RECTAL EVERY 6 HOURS PRN
Status: DISCONTINUED | OUTPATIENT
Start: 2024-12-06 | End: 2024-12-12 | Stop reason: HOSPADM

## 2024-12-06 RX ADMIN — METHOCARBAMOL TABLETS 500 MG: 500 TABLET, COATED ORAL at 23:16

## 2024-12-06 RX ADMIN — IOPAMIDOL 75 ML: 755 INJECTION, SOLUTION INTRAVENOUS at 15:59

## 2024-12-06 RX ADMIN — SODIUM CHLORIDE, PRESERVATIVE FREE 10 ML: 5 INJECTION INTRAVENOUS at 23:19

## 2024-12-06 RX ADMIN — ARFORMOTEROL TARTRATE: 15 SOLUTION RESPIRATORY (INHALATION) at 20:43

## 2024-12-06 RX ADMIN — OXYCODONE 5 MG: 5 TABLET ORAL at 23:16

## 2024-12-06 RX ADMIN — DILTIAZEM HYDROCHLORIDE 18 MG: 5 INJECTION, SOLUTION INTRAVENOUS at 17:33

## 2024-12-06 RX ADMIN — ATORVASTATIN CALCIUM 40 MG: 40 TABLET, FILM COATED ORAL at 23:16

## 2024-12-06 ASSESSMENT — PAIN DESCRIPTION - LOCATION
LOCATION: BACK;CHEST
LOCATION: ARM;CHEST;BACK
LOCATION: ARM

## 2024-12-06 ASSESSMENT — PAIN DESCRIPTION - ONSET
ONSET: ON-GOING
ONSET: ON-GOING

## 2024-12-06 ASSESSMENT — PAIN DESCRIPTION - PAIN TYPE
TYPE: ACUTE PAIN
TYPE: ACUTE PAIN

## 2024-12-06 ASSESSMENT — LIFESTYLE VARIABLES
HOW MANY STANDARD DRINKS CONTAINING ALCOHOL DO YOU HAVE ON A TYPICAL DAY: 3 OR 4
HOW OFTEN DO YOU HAVE A DRINK CONTAINING ALCOHOL: 2-3 TIMES A WEEK

## 2024-12-06 ASSESSMENT — PAIN DESCRIPTION - DESCRIPTORS
DESCRIPTORS: SHARP
DESCRIPTORS: SHARP

## 2024-12-06 ASSESSMENT — PAIN - FUNCTIONAL ASSESSMENT
PAIN_FUNCTIONAL_ASSESSMENT: ACTIVITIES ARE NOT PREVENTED
PAIN_FUNCTIONAL_ASSESSMENT: ACTIVITIES ARE NOT PREVENTED
PAIN_FUNCTIONAL_ASSESSMENT: 0-10

## 2024-12-06 ASSESSMENT — PAIN SCALES - GENERAL
PAINLEVEL_OUTOF10: 5
PAINLEVEL_OUTOF10: 9

## 2024-12-06 ASSESSMENT — PAIN DESCRIPTION - FREQUENCY
FREQUENCY: INTERMITTENT
FREQUENCY: INTERMITTENT

## 2024-12-06 ASSESSMENT — PAIN DESCRIPTION - ORIENTATION
ORIENTATION: RIGHT;UPPER
ORIENTATION: UPPER;RIGHT
ORIENTATION: RIGHT

## 2024-12-06 NOTE — ED PROVIDER NOTES
ED Attending Attestation Note     Date of evaluation: 12/6/2024    This patient was seen by the resident.  I have seen and examined the patient, agree with the workup, evaluation, management and diagnosis. The care plan has been discussed.  I have reviewed the ECG and concur with the resident's interpretation.      Briefly, Gregorio Coombs  is a 64 y.o. male with a PMH inclusive of  CAD, CAD, HTB, AUD who presents for evaluation of chest pain, back pain, SOB, right sided weakness.     Notable exam findings include Right sided weakness. Normal status. A fib with RVR    Assessment/ Medical Decision Making:     History and exam concerning for acute stroke syndrome.  Patient also found to have new onset A-fib.  He will require admission.         Porter Jimenez MD  12/10/24 0629

## 2024-12-06 NOTE — ED PROVIDER NOTES
THE [facility]  EMERGENCY DEPARTMENT ENCOUNTER          EM RESIDENT NOTE     Date of evaluation: 12/6/2024    Chief Complaint     Extremity Weakness (Weakness of r arm began last Thursday ) and Chest Pain (Pt endorses R sided chest and back pain)    History of Present Illness     Gregorio Coombs Sr. is a 64 y.o. male with a history of HTN, CAD, AUD who presents for evaluation of chest pain, back pain, SOB, and right sided weakness. Reports all of these symptoms started 4 days ago. Patient denies associated fever, cough, lightheadedness, dizziness, nausea, vomiting, vision changes. Patient recently admitted to the hospital for chest pain. Workup including nuclear stress test was unremarkable.     MEDICAL DECISION MAKING / ASSESSMENT / PLAN     INITIAL VITALS: BP: (!) 155/86, Temp: 98.6 °F (37 °C), Pulse: (!) 139, Respirations: 16, SpO2: 96 %    Gregorio Coombs Sr. is a 64 y.o. male with a history of hypertension and coronary artery disease presenting with 4 days of chest pain, back pain, shortness of breath, and right sided upper and lower extremity weakness.  Patient is noted to be in new onset atrial fibrillation with rapid ventricular response.  His blood pressures are normal to slightly hypertensive.  He has a mild right-sided facial droop and 4/5 strength in his right upper and lower extremity concerning for an acute stroke.  There is also concern for possible aortic dissection given his concomitant chest pain, back pain, new focal neurologic deficit.  Due to the required contrast timing, we are unable to obtain both CTAs of the head and neck and CTAs of the chest/abdomen/pelvis.  Because the patient is out of the window for acute intervention of his suspected stroke, the decision was made to forego CTAs of the head and neck     CTA head without contrast showed no acute intracranial bleed or dense stroke.  CTAs of the chest/abdomen/pelvis showed no evidence of an aortic dissection.  EKG consistent  Negative Negative mg/dL    Specific Gravity, UA 1.010 1.005 - 1.030    Blood, Urine Negative Negative    pH, Urine 7.5 5.0 - 8.0    Protein, UA Negative Negative mg/dL    Urobilinogen, Urine 0.2 <2.0 E.U./dL    Nitrite, Urine Negative Negative    Leukocyte Esterase, Urine Negative Negative    Microscopic Examination Not Indicated     Urine Type NotGiven     Urine Reflex to Culture Not Indicated    Urine Drug Screen   Result Value Ref Range    Amphetamine Screen, Ur Neg Negative <1000ng/mL    Barbiturate Screen, Ur Neg Negative <200 ng/mL    Benzodiazepine Screen, Urine Neg Negative <200 ng/mL    Cannabinoid Scrn, Ur POSITIVE (A) Negative <50 ng/mL    Cocaine Metabolite Screen, Urine Neg Negative <300 ng/mL    Opiate Screen, Urine Neg Negative <300 ng/mL    Phencyclidine (PCP), Screen, Urine Neg Negative <25 ng/mL    Methadone Screen, Urine Neg Negative <300 ng/mL    Oxycodone Urine Neg Negative <100 ng/ml    FENTANYL SCREEN, URINE Neg Negative <5 ng/mL    pH, Urine 6.0     Drug Screen Comment: see below    Ethanol   Result Value Ref Range    Ethanol Lvl 12 mg/dL   EKG 12 Lead   Result Value Ref Range    Ventricular Rate 133 BPM    QRS Duration 94 ms    Q-T Interval 308 ms    QTc Calculation (Bazett) 458 ms    R Axis 125 degrees    T Axis 8 degrees    Diagnosis       Supraventricular tachycardia with Premature supraventricular complexesSeptal infarct , age undeterminedLateral infarct , age undeterminedAbnormal ECGEKG performed in ER and to be interpreted by ER physician.Confirmed by MD, ER ([id number]),  CHARLENE WHITAKER ([id number]) on 12/6/2024 2:21:35 PM         EKG   Atrial fibrillation with rapid ventricular response.  Heart rate 133.  QRS duration 94.  QTc 458.  No obvious ST segment changes or pathologic T wave inversions.    ED BEDSIDE ULTRASOUND:  none    RECENT VITALS:  BP: (!) 139/93, Temp: 97.9 °F (36.6 °C), Pulse: 74,Respirations: 16, SpO2: 96 %     Procedures     None    ED Course     The patient was given the

## 2024-12-06 NOTE — ED NOTES
Patient Name: Gregorio Coombs .  : 1960 64 y.o.  MRN: [id number]  ED Room #: A05/A05-05     Chief complaint:   Chief Complaint   Patient presents with    Extremity Weakness     Weakness of r arm began last Thursday     Chest Pain     Pt endorses R sided chest and back pain     Hospital Problem/Diagnosis:       O2 Flow Rate:O2 Device: None (Room air)   (if applicable)  Cardiac Rhythm:   (if applicable)  Active LDA's:   Peripheral IV 24 Left Antecubital (Active)   Site Assessment Clean, dry & intact 24 142   Line Status Flushed 24 142            How does patient ambulate? Unknown, did not assess in the Emergency Department    2. How does patient take pills? Unknown, no oral medications were given in the Emergency Department    3. Is patient alert? Alert    4. Is patient oriented? To Person, To Place, To Time, To Situation, and Follows Commands    5.   Patient arrived from:  home  Facility Name: ___________________________________________    6. If patient is disoriented or from a Skill Nursing Facility has family been notified of admission? No    7. Patient belongings? Belongings: Clothing    Disposition of belongings? Kept with Patient     8. Any specific patient or family belongings/needs/dynamics?   a. Patient is right handed and c/o right side chest, back pain, and numbness right arm and decreased of function of right hand fingers that started Last Thursday. Patient in Atrial Fib RVR with hr of 110-130's, Cardizem 18 mg slow ivp given.     9. Miscellaneous comments/pending orders?  a. See admit orders      If there are any additional questions please reach out to the Emergency Department.      Demetra Melgar, NIKOLAS  [phone]

## 2024-12-07 LAB
ANION GAP SERPL CALCULATED.3IONS-SCNC: 11 MMOL/L (ref 3–16)
BUN SERPL-MCNC: 10 MG/DL (ref 7–20)
CALCIUM SERPL-MCNC: 8.9 MG/DL (ref 8.3–10.6)
CHLORIDE SERPL-SCNC: 103 MMOL/L (ref 99–110)
CHOLEST SERPL-MCNC: 210 MG/DL (ref 0–199)
CO2 SERPL-SCNC: 23 MMOL/L (ref 21–32)
CREAT SERPL-MCNC: 0.8 MG/DL (ref 0.8–1.3)
EKG ATRIAL RATE: 67 BPM
EKG ATRIAL RATE: 76 BPM
EKG DIAGNOSIS: NORMAL
EKG DIAGNOSIS: NORMAL
EKG P AXIS: 74 DEGREES
EKG P AXIS: 79 DEGREES
EKG P-R INTERVAL: 136 MS
EKG P-R INTERVAL: 138 MS
EKG Q-T INTERVAL: 410 MS
EKG Q-T INTERVAL: 438 MS
EKG QRS DURATION: 90 MS
EKG QRS DURATION: 90 MS
EKG QTC CALCULATION (BAZETT): 461 MS
EKG QTC CALCULATION (BAZETT): 462 MS
EKG R AXIS: 13 DEGREES
EKG R AXIS: 9 DEGREES
EKG T AXIS: 73 DEGREES
EKG T AXIS: 78 DEGREES
EKG VENTRICULAR RATE: 67 BPM
EKG VENTRICULAR RATE: 76 BPM
GFR SERPLBLD CREATININE-BSD FMLA CKD-EPI: >90 ML/MIN/{1.73_M2}
GLUCOSE SERPL-MCNC: 105 MG/DL (ref 70–99)
HDLC SERPL-MCNC: 87 MG/DL (ref 40–60)
LDLC SERPL CALC-MCNC: 93 MG/DL
POTASSIUM SERPL-SCNC: 4 MMOL/L (ref 3.5–5.1)
SODIUM SERPL-SCNC: 137 MMOL/L (ref 136–145)
TRIGL SERPL-MCNC: 151 MG/DL (ref 0–150)
TROPONIN, HIGH SENSITIVITY: 24 NG/L (ref 0–22)
TSH SERPL DL<=0.005 MIU/L-ACNC: 0.71 UIU/ML (ref 0.27–4.2)
VLDLC SERPL CALC-MCNC: 30 MG/DL

## 2024-12-07 PROCEDURE — 2580000003 HC RX 258: Performed by: INTERNAL MEDICINE

## 2024-12-07 PROCEDURE — 6360000002 HC RX W HCPCS: Performed by: INTERNAL MEDICINE

## 2024-12-07 PROCEDURE — 94761 N-INVAS EAR/PLS OXIMETRY MLT: CPT

## 2024-12-07 PROCEDURE — 93005 ELECTROCARDIOGRAM TRACING: CPT | Performed by: INTERNAL MEDICINE

## 2024-12-07 PROCEDURE — 84484 ASSAY OF TROPONIN QUANT: CPT

## 2024-12-07 PROCEDURE — 99222 1ST HOSP IP/OBS MODERATE 55: CPT | Performed by: INTERNAL MEDICINE

## 2024-12-07 PROCEDURE — 83036 HEMOGLOBIN GLYCOSYLATED A1C: CPT

## 2024-12-07 PROCEDURE — 93010 ELECTROCARDIOGRAM REPORT: CPT | Performed by: INTERNAL MEDICINE

## 2024-12-07 PROCEDURE — 92507 TX SP LANG VOICE COMM INDIV: CPT

## 2024-12-07 PROCEDURE — 6370000000 HC RX 637 (ALT 250 FOR IP): Performed by: INTERNAL MEDICINE

## 2024-12-07 PROCEDURE — 2060000000 HC ICU INTERMEDIATE R&B

## 2024-12-07 PROCEDURE — 80061 LIPID PANEL: CPT

## 2024-12-07 PROCEDURE — 36415 COLL VENOUS BLD VENIPUNCTURE: CPT

## 2024-12-07 PROCEDURE — 94640 AIRWAY INHALATION TREATMENT: CPT

## 2024-12-07 PROCEDURE — 84443 ASSAY THYROID STIM HORMONE: CPT

## 2024-12-07 PROCEDURE — 92610 EVALUATE SWALLOWING FUNCTION: CPT

## 2024-12-07 PROCEDURE — 92523 SPEECH SOUND LANG COMPREHEN: CPT

## 2024-12-07 PROCEDURE — 80048 BASIC METABOLIC PNL TOTAL CA: CPT

## 2024-12-07 PROCEDURE — 93005 ELECTROCARDIOGRAM TRACING: CPT | Performed by: NURSE PRACTITIONER

## 2024-12-07 RX ORDER — METOPROLOL SUCCINATE 25 MG/1
25 TABLET, EXTENDED RELEASE ORAL DAILY
Status: DISCONTINUED | OUTPATIENT
Start: 2024-12-07 | End: 2024-12-12 | Stop reason: HOSPADM

## 2024-12-07 RX ADMIN — METHOCARBAMOL TABLETS 500 MG: 500 TABLET, COATED ORAL at 17:27

## 2024-12-07 RX ADMIN — ARFORMOTEROL TARTRATE: 15 SOLUTION RESPIRATORY (INHALATION) at 08:35

## 2024-12-07 RX ADMIN — LISINOPRIL 2.5 MG: 2.5 TABLET ORAL at 09:33

## 2024-12-07 RX ADMIN — SODIUM CHLORIDE, PRESERVATIVE FREE 10 ML: 5 INJECTION INTRAVENOUS at 09:36

## 2024-12-07 RX ADMIN — METOPROLOL SUCCINATE 25 MG: 25 TABLET, EXTENDED RELEASE ORAL at 11:38

## 2024-12-07 RX ADMIN — ASPIRIN 81 MG: 81 TABLET, CHEWABLE ORAL at 09:33

## 2024-12-07 RX ADMIN — ATORVASTATIN CALCIUM 40 MG: 40 TABLET, FILM COATED ORAL at 20:29

## 2024-12-07 RX ADMIN — SODIUM CHLORIDE, PRESERVATIVE FREE 10 ML: 5 INJECTION INTRAVENOUS at 20:29

## 2024-12-07 RX ADMIN — METHOCARBAMOL TABLETS 500 MG: 500 TABLET, COATED ORAL at 20:29

## 2024-12-07 RX ADMIN — OXYCODONE 5 MG: 5 TABLET ORAL at 07:26

## 2024-12-07 RX ADMIN — METHOCARBAMOL TABLETS 500 MG: 500 TABLET, COATED ORAL at 11:38

## 2024-12-07 RX ADMIN — METHOCARBAMOL TABLETS 500 MG: 500 TABLET, COATED ORAL at 09:33

## 2024-12-07 RX ADMIN — TRAZODONE HYDROCHLORIDE 50 MG: 50 TABLET ORAL at 20:28

## 2024-12-07 ASSESSMENT — PAIN SCALES - WONG BAKER
WONGBAKER_NUMERICALRESPONSE: NO HURT
WONGBAKER_NUMERICALRESPONSE: NO HURT

## 2024-12-07 ASSESSMENT — PAIN SCALES - GENERAL
PAINLEVEL_OUTOF10: 3
PAINLEVEL_OUTOF10: 3
PAINLEVEL_OUTOF10: 8
PAINLEVEL_OUTOF10: 3
PAINLEVEL_OUTOF10: 0

## 2024-12-07 ASSESSMENT — PAIN DESCRIPTION - LOCATION
LOCATION: BACK;CHEST
LOCATION: CHEST;BACK

## 2024-12-07 ASSESSMENT — PAIN DESCRIPTION - ORIENTATION
ORIENTATION: RIGHT;UPPER
ORIENTATION: UPPER;RIGHT

## 2024-12-07 ASSESSMENT — PAIN DESCRIPTION - ONSET: ONSET: ON-GOING

## 2024-12-07 ASSESSMENT — PAIN DESCRIPTION - DESCRIPTORS
DESCRIPTORS: SHARP
DESCRIPTORS: SHARP

## 2024-12-07 ASSESSMENT — PAIN DESCRIPTION - FREQUENCY: FREQUENCY: INTERMITTENT

## 2024-12-07 ASSESSMENT — PAIN DESCRIPTION - PAIN TYPE: TYPE: ACUTE PAIN

## 2024-12-07 NOTE — PLAN OF CARE
Problem: Pain  Goal: Verbalizes/displays adequate comfort level or baseline comfort level  12/7/2024 1536 by Adam Ledesma RN  Outcome: Progressing  12/7/2024 0555 by Tashia Yao RN  Outcome: Progressing  Flowsheets (Taken 12/7/2024 0555)  Verbalizes/displays adequate comfort level or baseline comfort level:   Encourage patient to monitor pain and request assistance   Administer analgesics based on type and severity of pain and evaluate response   Consider cultural and social influences on pain and pain management   Assess pain using appropriate pain scale   Implement non-pharmacological measures as appropriate and evaluate response   Notify Licensed Independent Practitioner if interventions unsuccessful or patient reports new pain  Note: Patient noted pain during this shift. PRN pain medications given per MAR. See MAR for further details.      Problem: Neurosensory - Adult  Goal: Achieves stable or improved neurological status  12/7/2024 1536 by Adam Ledesma RN  Outcome: Progressing  12/7/2024 0555 by Tashia Yao RN  Outcome: Progressing  Flowsheets (Taken 12/7/2024 0555)  Achieves stable or improved neurological status:   Assess for and report changes in neurological status   Initiate measures to prevent increased intracranial pressure   Maintain blood pressure and fluid volume within ordered parameters to optimize cerebral perfusion and minimize risk of hemorrhage   Monitor temperature, glucose, and sodium. Initiate appropriate interventions as ordered  12/7/2024 0554 by Tashia Yao RN  Outcome: Progressing  Flowsheets (Taken 12/7/2024 0554)  Achieves stable or improved neurological status:   Assess for and report changes in neurological status   Initiate measures to prevent increased intracranial pressure   Maintain blood pressure and fluid volume within ordered parameters to optimize cerebral perfusion and minimize risk of hemorrhage   Monitor temperature, glucose, and sodium. Initiate

## 2024-12-07 NOTE — PROGRESS NOTES
4 Eyes Skin Assessment     NAME:  Gregorio Coombs .  YOB: 1960  MEDICAL RECORD NUMBER:  [id number]    The patient is being assessed for  Admission    I agree that at least one RN has performed a thorough Head to Toe Skin Assessment on the patient. ALL assessment sites listed below have been assessed.      Areas assessed by both nurses:    Head, Face, Ears, Shoulders, Back, Chest, Arms, Elbows, Hands, Sacrum. Buttock, Coccyx, Ischium, Legs. Feet and Heels, Under Medical Devices , and Other          Does the Patient have a Wound? No noted wound(s)       Kurtis Prevention initiated by RN: No  Wound Care Orders initiated by RN: No    Pressure Injury (Stage 3,4, Unstageable, DTI, NWPT, and Complex wounds) if present, place Wound referral order by RN under : No    New Ostomies, if present place, Ostomy referral order under : No     Nurse 1 eSignature: Electronically signed by Tashia Yao RN on 12/7/24 at 7:06 AM EST    **SHARE this note so that the co-signing nurse can place an eSignature**    Nurse 2 eSignature: {Esignature:[id number]}

## 2024-12-07 NOTE — PLAN OF CARE
Hold starting heparin until we get MRI  Need to assess stroke burden given symptoms   Discussed with attending neurologist, Dr. Sherwood.  POC discussed with Dr. Beck.    Pneumonia

## 2024-12-07 NOTE — PLAN OF CARE
Completed clinical swallow evaluation and speech evaluation. Please see report in EMR.     Carlene Erwin M.A., CCC-SLP [id number]  Speech-Language Pathologist

## 2024-12-07 NOTE — PROGRESS NOTES
Speech Language Pathology  Facility/Department:[address] [facility]  Cognitive-linguistic Evaluation + treatment / Swallow Evaluation  Name: Gregorio Coombs Sr.  : 1960  MRN: [id number]                                                       Patient Diagnosis(es):   Patient Active Problem List    Diagnosis Date Noted    Hypomagnesemia 2022    Thyroid nodule 2022    Respiratory failure with hypoxia 2022    New onset a-fib (HCC) 2024    Elevated troponin 2024    PFO (patent foramen ovale)     Other emphysema (HCC)- seen on CT scan  2021    Coronary artery disease involving native coronary artery of native heart without angina pectoris 2021    Elevated liver enzymes 2021    Alcohol abuse 2021    Folate deficiency 2021    Essential hypertension     Thyroid goiter 2020    Chest pain     SOB (shortness of breath)        Past Medical History:   Diagnosis Date    CAD (coronary artery disease)     Hyperlipidemia     Hypertension      History reviewed. No pertinent surgical history.    Reason for Referral:  Gregorio Coombs Sr.  was referred for a Speech Therapy evaluation to assess swallow function and/or communication.    History of Present Illness  Per MD notes:  Pt presenting with x4 days of right sided weakness and dysarthria. Pt is undergoing additional work up for a stroke at this time.     Imaging  CTA CHEST ABDOMEN PELVIS W CONTRAST   Final Result   1. No evidence for acute aortic process    2. Coronary artery calcifications   3. Prominent prostate   4. Dominant large left thyroid nodule noted on prior ultrasound 2024      Electronically signed by MD Braulio Miller      CT HEAD WO CONTRAST   Final Result      1. No evidence for acute intracranial process. No bleed or shift.   2. Acute sphenoid sinusitis      Electronically signed by MD Braulio Miller      MRI BRAIN WO CONTRAST    (Results Pending)   Vascular duplex lower extremity

## 2024-12-07 NOTE — ED NOTES
Attempted report to RN, but not ready and doesn't have correct phone yet. Will try back shortly.      Demetra Melgar, NIKOLAS  12/06/24 1931

## 2024-12-07 NOTE — ED NOTES
Attempted report again to Jag CONNOR on BCC but unable to take report still. Called Charge NIKOLAS Velasco and took report.      Demetra Melgar RN  12/06/24 2002

## 2024-12-07 NOTE — CONSULTS
[facility]  Cardiology Inpatient Consult Service                                                                                          Pt Name: Gregorio Coombs Sr.  Age: 64 y.o.  Sex: male  : 1960  Location: [facility]/3528-    Referring Physician: Elina Beck, *  Primary cardiologist : Dr Garcia    Reason for Consult:     Reason for Consultation/Chief Complaint: Weakness, Afib    HPI:      Gregorio Coombs Sr. is a 64 y.o. male with a past medical history of hypertension, atherosclerotic heart disease, alcohol use disorder who presented to the hospital with right-sided weakness and shortness of breath.  Found to have atrial fibrillation on admission.  Currently back in sinus rhythm.  States that he is unable to open his fingers completely.  Has history of alcohol use disorder.  No bleeding.  No abdominal pain.  No visual symptoms.  No syncope.      Histories     Past Medical History:   has a past medical history of CAD (coronary artery disease), Hyperlipidemia, and Hypertension.    Surgical History:   has no past surgical history on file.     Social History:   reports that he has been smoking cigarettes. He started smoking about 49 years ago. He has a 25 pack-year smoking history. He has never used smokeless tobacco. He reports current alcohol use. He reports current drug use. Drug: Marijuana (Weed).     Family History:  Noncontributory      Medications:       Home Medications  Were reviewed and are listed in nursing record. and/or listed below  Prior to Admission medications    Medication Sig Start Date End Date Taking? Authorizing Provider   albuterol sulfate HFA (VENTOLIN HFA) 108 (90 Base) MCG/ACT inhaler Inhale 2 puffs into the lungs every 8 hours as needed for Wheezing   Yes Provider, MD Misty   fluticasone-salmeterol (ADVAIR) 100-50 MCG/ACT AEPB diskus inhaler Inhale 1 puff into the lungs in the morning and 1 puff in the evening.   Yes Provider  (153 lb 14.1 oz)         General appearance: alert, appears stated age, cooperative and no distress  Lungs: Unlabored breathing, clear to auscultation bilaterally  Heart: regular rate and rhythm and S1, S2 normal,  Abdomen: soft, non-tender; bowel sounds normal  Extremities: no cyanosis, no edema.  Has Dupuytren's contractures   Skin: Skin color, texture, turgor normal.  Neurologic: No focal deficits. A, O x 3       Labs:     Recent Labs     12/06/24  1431 12/06/24  1937 12/07/24  0356   *  --  137   K see below 3.9 4.0   BUN 9  --  10   CREATININE 0.8  --  0.8     --  103   CO2 19*  --  23   GLUCOSE 111*  --  105*   CALCIUM 9.0  --  8.9     Recent Labs     12/06/24  1431   WBC 5.9   HGB 14.3   HCT 42.0      MCV 97.5       Recent Labs     12/06/24  1431   INR 1.02       Recent Labs     12/07/24  0027   TSH 0.71         Lab Results   Component Value Date    CKTOTAL 275 06/05/2020    TROPONINI <0.01 09/15/2022           Imaging:     I personally reviewed imaging studies including CXR, Stress test, TTE/SOPHIA.    Last ECG (if available) - I have reviewed EKG with the following interpretation  Initially afib with rvr - last EKG shows nsr    Telemetry: Personally interpreted  Sinus    Last Stress (if available):11/20/24  This is a negative stress study.  Myocardial perfusion imaging shows uniform tracer uptake with no reversibility/ischemia  Gated study shows normal LV size wall motion with EF of greater than 70%  EKG further test is negative for ischemia  Last TTE/SOPHIA(if available):    Last Cath (if available):    Last CMR  (if available):      Assessment / Plan:     PAF -currently in sinus rhythm.  Recommend starting anticoagulation once cleared by neurology.  Importance of avoiding alcohol stressed.  Follow-up with EP as outpatient.  Start Toprol-XL 25 mg daily.  Last stress test showed normal EF.  CVA-management per neurology  ASHD, coronary calcification, neg stress test.  Currently does not have any

## 2024-12-07 NOTE — CONSULTS
QRS Duration 90 ms    Q-T Interval 438 ms    QTc Calculation (Bazett) 462 ms    P Axis 74 degrees    R Axis 9 degrees    T Axis 78 degrees    Diagnosis Normal sinus rhythmNormal ECG           Studies:  CTA CHEST ABDOMEN PELVIS W CONTRAST   Final Result   1. No evidence for acute aortic process    2. Coronary artery calcifications   3. Prominent prostate   4. Dominant large left thyroid nodule noted on prior ultrasound July 23, 2024      Electronically signed by MD Braulio Miller      CT HEAD WO CONTRAST   Final Result      1. No evidence for acute intracranial process. No bleed or shift.   2. Acute sphenoid sinusitis      Electronically signed by MD Braulio Miller      MRI BRAIN WO CONTRAST    (Results Pending)   Vascular duplex lower extremity venous right    (Results Pending)   Vascular duplex lower extremity venous left    (Results Pending)       Impression:  Ischemic Stroke  Right sided weakness  Dysarthria  AFIB with PFO  CAD  HTN  Alcohol abuse  Tobacco abuse    Gregorio Coombs . is a 64 y.o. male who presented with right sided weakness acute in onset 12/2/24 with history of CAD, HTN, PFO, and new onset AFIB concerning for ischemic stroke.    Recommendations:  Obtain MRI brain, if small stroke for vascular territory may initiate anticoagulation after 7 days from the stroke, whereas if moderate to large stroke would recommend 10-14 days to initiation of anticoagulation.  Recommend anti-platelet and statin as stroke preventive therapy for now.  Recommend echo now and cardiology consultation for management of anticoagulation.  May follow up with me post-hospitalization, longer term recommend follow up with vascular neurology (e.g. at [facility] with many excellent vascular subspecialists).  If any new, worsening, or concerning symptoms were to develop the patient was instructed to seek immediate medical attention.  We discussed neurologic consequences of ongoing usage of alcohol and tobacco. Recommend  team pursue tobacco and alcohol cessation counseling as well.  If any acute worsening of patient's condition or examination a code stroke should be called, stat CT head/CTA head/neck and new consultation of the stroke team is recommended, and if edema or bleed is present stat neurosurgical consultation is recommended.    Your medical management. Thank you for allowing my participation in Gregorio's care. Please call if any questions or concerns.    Qasim Sherwood D.O.  [facility]    Total time spent performing history, physical examination, counseling, chart review, and documentation was 81 minutes. A copy of this note was provided for the attending: Spencer Cervantes MD .

## 2024-12-07 NOTE — PLAN OF CARE
Problem: Pain  Goal: Verbalizes/displays adequate comfort level or baseline comfort level  Outcome: Progressing  Verbalizes/displays adequate comfort level or baseline comfort level:   Encourage patient to monitor pain and request assistance   Administer analgesics based on type and severity of pain and evaluate response   Consider cultural and social influences on pain and pain management   Assess pain using appropriate pain scale   Implement non-pharmacological measures as appropriate and evaluate response   Notify Licensed Independent Practitioner if interventions unsuccessful or patient reports new pain  Note: Patient noted pain during this shift. PRN pain medications given per MAR. See MAR for further details.     Problem: Neurosensory - Adult  Goal: Achieves stable or improved neurological status  Outcome: Progressing  Achieves stable or improved neurological status:   Assess for and report changes in neurological status   Initiate measures to prevent increased intracranial pressure   Maintain blood pressure and fluid volume within ordered parameters to optimize cerebral perfusion and minimize risk of hemorrhage   Monitor temperature, glucose, and sodium. Initiate appropriate interventions as ordered      Problem: Respiratory - Adult  Goal: Achieves optimal ventilation and oxygenation  Outcome: Progressing  Achieves optimal ventilation and oxygenation:   Assess for changes in respiratory status   Position to facilitate oxygenation and minimize respiratory effort   Initiate smoking cessation protocol as indicated   Assess the need for suctioning and aspirate as needed   Respiratory therapy support as indicated   Assess for changes in mentation and behavior   Oxygen supplementation based on oxygen saturation or arterial blood gases   Encourage broncho-pulmonary hygiene including cough, deep breathe, incentive spirometry   Assess and instruct to report shortness of breath or any respiratory  imbalances   Monitor response to electrolyte replacements, including repeat lab results as appropriate   Instruct patient on fluid and nutrition restrictions as appropriate   Administer electrolyte replacement as ordered   Fluid restriction as ordered      Problem: Hematologic - Adult  Goal: Maintains hematologic stability  Outcome: Progressing  Maintains hematologic stability:   Assess for signs and symptoms of bleeding or hemorrhage   Administer blood products/factors as ordered   Monitor labs for bleeding or clotting disorders  Note: Patient's hemoglobin this AM:   Recent Labs     12/06/24  1431   HGB 14.3     Patient's platelet count this AM:   Recent Labs     12/06/24  1431       Thrombocytopenia not present at this time.  Patient showing no signs or symptoms of active bleeding.  Transfusion not indicated at this time.  Patient verbalizes understanding of all instructions. Will continue to assess and implement POC. Call light within reach and hourly rounding in place.        Problem: Safety - Adult  Goal: Free from fall injury  Outcome: Progressing  Free From Fall Injury:   Instruct family/caregiver on patient safety   Based on caregiver fall risk screen, instruct family/caregiver to ask for assistance with transferring infant if caregiver noted to have fall risk factors

## 2024-12-07 NOTE — H&P
helical images are made from the thoracic inlet to the pubic symphysis after 75 mL of Isovue-370 intravenous contrast. MIP reformatted images acquired on a separate workstation with concurrent supervision radiologist. Up-to-date CT equipment and radiation dose reduction techniques were employed. FINDINGS: On noncontrast images, atherosclerotic calcification LAD left circumflex right coronary arteries identified. Diffuse enlargement left thyroid gland identified with 3.8 cm nodular focus identified. Postcontrast, no dissection of the thoracic aorta is identified. Ascending thoracic aorta measures 3.4 cm. Within the right lung apex, large bulla identified measuring 5.1 cm. The infrarenal abdominal aorta measures 1.4 cm. No dissection. Iliac arteries are mildly tortuous without stenosis. The kidneys, adrenals, liver unremarkable. The celiac artery, superior mesenteric artery, and inferior mesenteric artery are patent. Prostate measures 3.8 cm in AP dimension. Spondylosis of the cervical spine and lumbosacral interval identified.     1. No evidence for acute aortic process 2. Coronary artery calcifications 3. Prominent prostate 4. Dominant large left thyroid nodule noted on prior ultrasound July 23, 2024 Electronically signed by MD Braulio Miller    CT HEAD WO CONTRAST    Result Date: 12/6/2024  Reason: Right-sided weakness CT the head without contrast TECHNIQUE: Collimated helical images are made from skull base to the brain convexity without intravenous contrast. Up-to-date CT equipment and radiation dose reduction techniques were employed. FINDINGS: The visualized paranasal sinuses demonstrate air-fluid level leftward sphenoid sinus. The gray-white junction is intact, bilaterally. No extra-axial fluid collecting system. No midline shift.     1. No evidence for acute intracranial process. No bleed or shift. 2. Acute sphenoid sinusitis Electronically signed by MD Braulio Miller        Electronically signed by Elina WEAVER

## 2024-12-07 NOTE — PROGRESS NOTES
Clinical Pharmacy Note  Medication History     Admit Date: 12/6/2024    List of current medications patient is taking is complete. Home Medication list in EPIC updated to reflect changes noted below.    Source of information: Rx Dispense Report and Patient Interview (in-person)    [facility]: [facility] ([place] - [place], [place] - [address] - P [phone] - F [phone]       Changes made to medication list:     Medications removed:  None    Medications added:   Albuterol Inhaler    Medication doses adjusted:   Trazodone, patient reports that he takes 50 mg in the evening and another 50 mg at bedtime instead of 50 mg nightly as needed for sleep per prescription.  Zolpidem, patient reports that he takes 5 mg in the evening and another 5 mg at bedtime instead of 5 mg nightly as needed for sleep per prescription.     Other notes:   None    Current Outpatient Medications   Medication Instructions    albuterol sulfate HFA (VENTOLIN HFA) 108 (90 Base) MCG/ACT inhaler 2 puffs, Inhalation, EVERY 8 HOURS PRN    aspirin 81 mg, Oral, DAILY    atorvastatin (LIPITOR) 20 mg, Oral, NIGHTLY    fluticasone-salmeterol (ADVAIR) 100-50 MCG/ACT AEPB diskus inhaler 1 puff, Inhalation, EVERY 12 HOURS    lisinopril (PRINIVIL;ZESTRIL) 2.5 mg, Oral, DAILY    naproxen (NAPROSYN) 500 mg, Oral, 2 TIMES DAILY WITH MEALS    traZODone (DESYREL) 50 mg, Oral, 2 TIMES at PM and Bedtime    zolpidem (AMBIEN) 5 mg, Oral, 2 TIMES at PM and Bedtime       Please contact pharmacy with any questions,    Daniel Thorne (Iris)  Pharmacy Intern, PharmD Candidate 2026

## 2024-12-07 NOTE — PROGRESS NOTES
Pain Progress Note    Data:  Patient noted 9/10 pain in upper back and right side of chest. Also, patient noted pain was radiating down to their right arm.     Action:  This RN notified on call MD and charge RN. PRN pain medication orders were placed. STAT EKG, MRI of head, and troponin level orders were placed as well. PRN pain medication given per MAR. See MAR for further details.     Response:  Patient noted pain level at a 3 and is feeling comfortable. Troponin results came back as 24, EKG showed as normal sinus, and MRI results are still pending at this time. Plan of care ongoing.

## 2024-12-07 NOTE — PROGRESS NOTES
chloride, oxyCODONE      Physical Exam Performed:      General appearance:  No apparent distress  Respiratory:  Normal respiratory effort.   Cardiovascular:  Regular rate and rhythm.  Abdomen:  Soft, non-tender, non-distended.  Musculoskeletal:  No edema  Neurologic:  Non-focal  Psychiatric:  Alert and oriented    BP (!) 142/75   Pulse 69   Temp 97.8 °F (36.6 °C) (Oral)   Resp (!) 9   Ht 1.702 m (5' 7\")   Wt 71.2 kg (157 lb)   SpO2 95%   BMI 24.59 kg/m²     Telemetry:      Personally reviewed and interpreted telemetry (Rhythm Strip) on 12/7/2024 with the following findings:     Diet: ADULT DIET; Regular    DVT Prophylaxis: []PPx LMWH  []SQ Heparin  []IPC/SCDs  []Eliquis  []Xarelto  []Coumadin  [] Heparin Drip  []Other -      Code status: Full Code    PT/OT Eval Status:   []NOT yet ordered  []Ordered and Pending   []Seen with Recommendations for:   []Home independently  []Home w/ assist  []HHC  []SNF  []Acute Rehab    Multi-Disciplinary Rounds with Case Management completed on 12/7/2024 with the following recommendations:  Anticipated Discharge Location: []Home w/ []HHC vs []SNF  []Acute Rehab  []LTAC  []Hospice  []Other -    Anticipated Discharge Day/Date:    Barriers to Discharge:   --------------------------------------------------    MDM (any 2 required for High level billing)    A. Problems (any 1)  [x] Acute/Chronic Illness/injury posing ongoing threat to life and/or bodily function without ongoing treatment    [] Severe exacerbation of chronic illness    --------------------------------------------------  B. Risk of Treatment (any 1)    [] Drugs/treatments that require intensive monitoring for toxicity    [] IV ABX (Vancomycin, Aminoglycosides, etc)     [] Post-Cath/Contrast study requiring serial monitoring    [] IV Narcotic analgesia    [] Aggressive IV diuresis    [] Hypertonic Saline    [] Critical electrolyte abnormalities requiring IV replacement    [] Insulin - Scheduled/SSI or Insulin gtt    []  Anticoagulation (Heparin gtt or Coumadin - other anticoagulants in special circumstances)    [x] Cardiac Medications (IV Amiodarone/Diltiazem, Tikosyn, etc)    [] Hemodialysis    [] Other -    [] Change in code status    [] Decision to escalate care    [] Major surgery/procedure with associated risk factors    --------------------------------------------------  C. Data (any 2)    [] Data Review (any 3)    [] Consultant notes from yesterday/today    [x] All available current labs reviewed interpreted for clinical significance    [x] Appropriate follow-up labs were ordered  [] Collateral history obtained     [] Independent Interpretation of tests (any 1)    [] Telemetry (Rhythm Strip) personally reviewed and interpreted        [] Imaging personally reviewed and interpreted     [x] Discussion (any 1)  [x] Multi-Disciplinary Rounds with Case Management  [] Discussed management of the case with           Labs:  Personally reviewed on 12/7/2024 and interpreted for clinical significance as documented above.     Recent Labs     12/06/24  1431   WBC 5.9   HGB 14.3   HCT 42.0        Recent Labs     12/06/24  1431 12/06/24  1937 12/07/24  0356   *  --  137   K see below 3.9 4.0     --  103   CO2 19*  --  23   BUN 9  --  10   CREATININE 0.8  --  0.8   CALCIUM 9.0  --  8.9     Recent Labs     12/06/24  1431 12/06/24  1611 12/07/24  0027   PROBNP <36  --   --    TROPHS 19 25* 24*     No results for input(s): \"LABA1C\" in the last 72 hours.  No results for input(s): \"AST\", \"ALT\", \"BILIDIR\", \"BILITOT\", \"ALKPHOS\" in the last 72 hours.  Recent Labs     12/06/24  1431 12/07/24  0027   INR 1.02  --    TSH  --  0.71       Urine Cultures: No results found for: \"LABURIN\"  Blood Cultures:   Lab Results   Component Value Date/Time    BC No Growth after 4 days of incubation. 08/02/2022 03:53 AM    BC No Growth after 4 days of incubation. 08/02/2022 03:53 AM     No results found for: \"BLOODCULT2\"  Organism: No results found

## 2024-12-08 ENCOUNTER — APPOINTMENT (OUTPATIENT)
Dept: MRI IMAGING | Age: 64
DRG: 057 | End: 2024-12-08
Payer: MEDICARE

## 2024-12-08 PROBLEM — I25.10 CORONARY ARTERY CALCIFICATION: Status: ACTIVE | Noted: 2024-12-08

## 2024-12-08 PROBLEM — I48.91 ATRIAL FIBRILLATION (HCC): Status: ACTIVE | Noted: 2024-12-08

## 2024-12-08 LAB
EST. AVERAGE GLUCOSE BLD GHB EST-MCNC: 125.5 MG/DL
HBA1C MFR BLD: 6 %

## 2024-12-08 PROCEDURE — 99232 SBSQ HOSP IP/OBS MODERATE 35: CPT | Performed by: NURSE PRACTITIONER

## 2024-12-08 PROCEDURE — 72142 MRI NECK SPINE W/DYE: CPT

## 2024-12-08 PROCEDURE — 6370000000 HC RX 637 (ALT 250 FOR IP): Performed by: INTERNAL MEDICINE

## 2024-12-08 PROCEDURE — 2060000000 HC ICU INTERMEDIATE R&B

## 2024-12-08 PROCEDURE — 72147 MRI CHEST SPINE W/DYE: CPT

## 2024-12-08 PROCEDURE — 94640 AIRWAY INHALATION TREATMENT: CPT

## 2024-12-08 PROCEDURE — 72146 MRI CHEST SPINE W/O DYE: CPT

## 2024-12-08 PROCEDURE — 99233 SBSQ HOSP IP/OBS HIGH 50: CPT | Performed by: NURSE PRACTITIONER

## 2024-12-08 PROCEDURE — 72141 MRI NECK SPINE W/O DYE: CPT

## 2024-12-08 PROCEDURE — 94761 N-INVAS EAR/PLS OXIMETRY MLT: CPT

## 2024-12-08 PROCEDURE — 70552 MRI BRAIN STEM W/DYE: CPT

## 2024-12-08 PROCEDURE — 6370000000 HC RX 637 (ALT 250 FOR IP): Performed by: NURSE PRACTITIONER

## 2024-12-08 PROCEDURE — 70551 MRI BRAIN STEM W/O DYE: CPT

## 2024-12-08 PROCEDURE — A9576 INJ PROHANCE MULTIPACK: HCPCS | Performed by: NURSE PRACTITIONER

## 2024-12-08 PROCEDURE — 6360000004 HC RX CONTRAST MEDICATION: Performed by: NURSE PRACTITIONER

## 2024-12-08 PROCEDURE — 6360000002 HC RX W HCPCS: Performed by: INTERNAL MEDICINE

## 2024-12-08 PROCEDURE — 2580000003 HC RX 258: Performed by: INTERNAL MEDICINE

## 2024-12-08 RX ORDER — OXYCODONE HYDROCHLORIDE 5 MG/1
5 TABLET ORAL NIGHTLY PRN
Status: DISCONTINUED | OUTPATIENT
Start: 2024-12-08 | End: 2024-12-12 | Stop reason: HOSPADM

## 2024-12-08 RX ADMIN — SODIUM CHLORIDE, PRESERVATIVE FREE 10 ML: 5 INJECTION INTRAVENOUS at 09:27

## 2024-12-08 RX ADMIN — SODIUM CHLORIDE, PRESERVATIVE FREE 10 ML: 5 INJECTION INTRAVENOUS at 20:04

## 2024-12-08 RX ADMIN — TRAZODONE HYDROCHLORIDE 50 MG: 50 TABLET ORAL at 20:04

## 2024-12-08 RX ADMIN — OXYCODONE 5 MG: 5 TABLET ORAL at 04:13

## 2024-12-08 RX ADMIN — ATORVASTATIN CALCIUM 40 MG: 40 TABLET, FILM COATED ORAL at 20:04

## 2024-12-08 RX ADMIN — ASPIRIN 81 MG: 81 TABLET, CHEWABLE ORAL at 09:26

## 2024-12-08 RX ADMIN — METHOCARBAMOL TABLETS 500 MG: 500 TABLET, COATED ORAL at 09:26

## 2024-12-08 RX ADMIN — METHOCARBAMOL TABLETS 500 MG: 500 TABLET, COATED ORAL at 16:18

## 2024-12-08 RX ADMIN — METHOCARBAMOL TABLETS 500 MG: 500 TABLET, COATED ORAL at 20:04

## 2024-12-08 RX ADMIN — ARFORMOTEROL TARTRATE: 15 SOLUTION RESPIRATORY (INHALATION) at 20:05

## 2024-12-08 RX ADMIN — APIXABAN 5 MG: 5 TABLET, FILM COATED ORAL at 20:04

## 2024-12-08 RX ADMIN — GADOTERIDOL 16 ML: 279.3 INJECTION, SOLUTION INTRAVENOUS at 14:18

## 2024-12-08 RX ADMIN — METOPROLOL SUCCINATE 25 MG: 25 TABLET, EXTENDED RELEASE ORAL at 09:26

## 2024-12-08 RX ADMIN — METHOCARBAMOL TABLETS 500 MG: 500 TABLET, COATED ORAL at 14:53

## 2024-12-08 RX ADMIN — LISINOPRIL 2.5 MG: 2.5 TABLET ORAL at 09:26

## 2024-12-08 ASSESSMENT — PAIN DESCRIPTION - PAIN TYPE: TYPE: ACUTE PAIN

## 2024-12-08 ASSESSMENT — PAIN SCALES - GENERAL
PAINLEVEL_OUTOF10: 0
PAINLEVEL_OUTOF10: 8
PAINLEVEL_OUTOF10: 8
PAINLEVEL_OUTOF10: 0

## 2024-12-08 ASSESSMENT — PAIN DESCRIPTION - DESCRIPTORS
DESCRIPTORS: BURNING;SHARP
DESCRIPTORS: ACHING

## 2024-12-08 ASSESSMENT — PAIN DESCRIPTION - FREQUENCY: FREQUENCY: INTERMITTENT

## 2024-12-08 ASSESSMENT — PAIN - FUNCTIONAL ASSESSMENT
PAIN_FUNCTIONAL_ASSESSMENT: PREVENTS OR INTERFERES SOME ACTIVE ACTIVITIES AND ADLS
PAIN_FUNCTIONAL_ASSESSMENT: ACTIVITIES ARE NOT PREVENTED

## 2024-12-08 ASSESSMENT — PAIN DESCRIPTION - ORIENTATION
ORIENTATION: RIGHT
ORIENTATION: UPPER

## 2024-12-08 ASSESSMENT — PAIN DESCRIPTION - LOCATION
LOCATION: ARM
LOCATION: BACK

## 2024-12-08 ASSESSMENT — PAIN DESCRIPTION - ONSET: ONSET: ON-GOING

## 2024-12-08 NOTE — PROGRESS NOTES
[facility] Cardiology Progress Note    Primary Cardiologist: Dr Garcia    CC/HPI: AF and weakness    Gregorio Coombs Sr. is a 64 y.o. male with a past medical history of hypertension, atherosclerotic heart disease, alcohol use disorder who presented to the hospital with right-sided weakness and shortness of breath.  Found to have atrial fibrillation on admission.  Currently back in sinus rhythm.  States that he is unable to open his fingers completely.  Has history of alcohol use disorder.  No bleeding.  No abdominal pain.  No visual symptoms.  No syncope.     S:  C/o sharp pain in chest, back, right arm and right leg. C/o right sided weakness.     Tele: Sinus, sinus bradycardia     O:  Physical Exam:  /67   Pulse 65   Temp 98.1 °F (36.7 °C) (Oral)   Resp 16   Ht 1.702 m (5' 7\")   Wt 71.2 kg (157 lb)   SpO2 94%   BMI 24.59 kg/m²    General (appearance):  No acute distress  Eyes: anicteric   Neck: soft, No JVD  Ears/Nose/Mouth/Thorat: No cyanosis  CV: RRR   Respiratory:  normal effort  GI: soft, non-tender, non-distended  Skin: Warm, dry. No rashes  Neuro/Psych: Alert and oriented x 3. Appropriate behavior  Ext:  No c/c.     I.O's=     Weight  Admission: Weight - Scale: 71.2 kg (157 lb)   Today: Weight - Scale: 71.2 kg (157 lb)    CBC:   Recent Labs     12/06/24  1431   WBC 5.9   HGB 14.3   HCT 42.0   MCV 97.5        BMP:   Recent Labs     12/06/24  1431 12/06/24  1937 12/07/24  0356   *  --  137   K see below 3.9 4.0     --  103   CO2 19*  --  23   BUN 9  --  10   CREATININE 0.8  --  0.8     Estimated Creatinine Clearance: 87 mL/min (based on SCr of 0.8 mg/dL).  Mag:   Lab Results   Component Value Date/Time    MG 1.60 08/03/2022 05:28 AM     LIVER PROFILE: No results for input(s): \"AST\", \"ALT\", \"LIPASE\", \"AMYLASE\", \"BILIDIR\", \"BILITOT\", \"ALKPHOS\" in the last 72 hours.    Invalid input(s): \"ALB\"  Pro-BNP:   Lab Results   Component Value Date/Time    PROBNP

## 2024-12-08 NOTE — PLAN OF CARE
Problem: Pain  Goal: Verbalizes/displays adequate comfort level or baseline comfort level  Outcome: Progressing  Verbalizes/displays adequate comfort level or baseline comfort level:   Encourage patient to monitor pain and request assistance   Administer analgesics based on type and severity of pain and evaluate response   Consider cultural and social influences on pain and pain management   Assess pain using appropriate pain scale   Implement non-pharmacological measures as appropriate and evaluate response   Notify Licensed Independent Practitioner if interventions unsuccessful or patient reports new pain  Note: Patient noted pain during this shift. PRN pain medication given per MAR. See MAR for further details.     Problem: Neurosensory - Adult  Goal: Achieves stable or improved neurological status  Outcome: Progressing  Achieves stable or improved neurological status:   Assess for and report changes in neurological status   Initiate measures to prevent increased intracranial pressure   Maintain blood pressure and fluid volume within ordered parameters to optimize cerebral perfusion and minimize risk of hemorrhage   Monitor temperature, glucose, and sodium. Initiate appropriate interventions as ordered      Problem: Respiratory - Adult  Goal: Achieves optimal ventilation and oxygenation  Outcome: Progressing  Achieves optimal ventilation and oxygenation:   Assess for changes in respiratory status   Position to facilitate oxygenation and minimize respiratory effort   Initiate smoking cessation protocol as indicated   Assess the need for suctioning and aspirate as needed   Respiratory therapy support as indicated   Assess for changes in mentation and behavior   Oxygen supplementation based on oxygen saturation or arterial blood gases   Encourage broncho-pulmonary hygiene including cough, deep breathe, incentive spirometry   Assess and instruct to report shortness of breath or any respiratory

## 2024-12-08 NOTE — PROGRESS NOTES
VSS on room air. AOx4. No acute changes in Neuro status this shift. NIHSS= 2. Voiding via BRP, denies pain or difficulty when urinating. Tolerating PO diet/fluids, denies N/V. All fall precautions in place.

## 2024-12-08 NOTE — PROGRESS NOTES
RN called MRI to find time to get this completed. MRI tech said they would put in transport for Pt, but that MRI had not been completed due to screening not being completed. This RN to complete MRI screening.

## 2024-12-08 NOTE — PLAN OF CARE
MRI CERVICAL SPINE:   1.  Multilevel degenerative disc and facet arthropathy as detailed above with mild spinal stenosis at C3-4, C4-5, C5-6, and C6-7. Mild to moderate spinal stenosis at C7-T1 without cord compression or abnormal cord signal.  2.  Severe bilateral C4, severe bilateral C5, severe bilateral C6, moderate left C7, moderate to severe right C7, and severe bilateral C8 foraminal stenosis.     MRI THORACIC SPINE:   1. Degenerative disc and facet arthropathy without spinal stenosis.  2. Moderate bilateral T1, moderate C2, and moderate right T9 foraminal stenosis.  3. Multinodular goiter with a large 4 cm left lower lobe nodule.    Discussed findings w/ Dr. Dunbar - can follow up as outpatient given no abnormal cord signal or cord compression.   Discussed w/ Dr. Sherwood - will obtain MRI B/C/T with mainro Payan NP  Neurology

## 2024-12-08 NOTE — PLAN OF CARE
Problem: Pain  Goal: Verbalizes/displays adequate comfort level or baseline comfort level  12/8/2024 1249 by Socrates Andrade, RN  Outcome: Progressing   Denies pain at this time  Problem: Safety - Adult  Goal: Free from fall injury  12/8/2024 1249 by Socrates Andrade, RN  Outcome: Progressing   All fall precautions in place. Bed locked and in lowest position with alarm on. Overbed table and personal belonings within reach. Call light within reach and patient instructed to use call light for assistance. Non-skid socks on.

## 2024-12-08 NOTE — PROGRESS NOTES
NEUROLOGY PROGRESS NOTE       Patient Name: Gregorio Coombs SrAlexandre YOB: 1960   Sex: Male Age: 64 yrs     Presenting CC:   Chief Complaint   Patient presents with    Extremity Weakness     Weakness of r arm began last Thursday     Chest Pain     Pt endorses R sided chest and back pain     Reason for Consult: stroke    Changes over last 24 hours:   MRI shows no stroke  Right hemiparesis persists  C/o pain in neck today, along w/ pain in right hemibody    ROS: continued R hemiparesis, no dysarthria    ASSESSMENT & RECOMMENDATIONS   Assessment:  Gregorio Coombs is a 65 y/o man with new onset atrial fibrillation and 6 days of right hemiparesis (most notable in right hand) and neck pain. He reports pain in his right upper limb and lower limb as well which would not be consistent with ischemic stroke. MRI brain shows no stroke. Will need to exclude myelopathy.     Plan:  - MRI C and T spine w/o mainor (STAT), d/w MRI dept  - If any evidence of compressive myelopathy will need neurosurgical consultation   - Continue to hold anticoagulation while awaiting further imaging as this may change plan of care  - If he does not have any need for surgery based on scans, will be okay to anticoagulate at that time    Will follow closely    D/w Dr. Sherwood, Cardiology NP, and MRI dept    JAMMIE Hill - CNP   Neurology  12/8/2024 9:59 AM  [employer]: [facility] Neurology    HISTORY     Initial HPI: 12/7  History of Present Illness:  Gregorio Coombs Sr. is a 64 y.o. male who presents with stroke  Location: right arm and leg  Quality: weak  Onset: abrupt  Course: ongoing  Frequency: once  Duration: 12/2/24 onset  Modifying factors: none  Associated symptoms: poor coordination and ataxia     He has history of PFO, CAD, HTN, and was found to have new onset AFIB.  He admits to alcohol consumption of half a pint of vodka two nights a week. He smokes cigarettes 0.5ppd for 40+ years.     He denies any numbness, dysphagia,  Oral Oral  Oral   SpO2: 97% 94%  97%   Weight:       Height:           General: Alert, no distress, well-nourished  Neurologic  Mental status: Alert  Fully oriented  Attention intact  No apparent aphasia  Follows commands with ease  Cranial nerves:   CN2: Visual fields grossly full  CN 3,4,6: Pupils equal and reactive to light, extraocular muscles intact  CN5: Facial sensation symmetric   CN7: Face symmetric without dysarthria  CN8: Hearing symmetric to spoken voice  Motor Exam:   R  L    Deltoid 3 5   Biceps 4 5   Triceps 4 5   Wrist extension  3 5   Interossei 1 5      R  L    Hip flexion  4 5   Hip extension  4 5   Knee flexion  4 5   Knee extension  4 5   Ankle dorsiflexion  3 5   Ankle plantar flexion  3 5     Sensory: light touch intact and symmetric in all 4 extremities.    Cerebellar/coordination: finger nose finger normal without ataxia  Tone: normal in all 4 extremities  Gait: deferred for safety    OTHER SYSTEMS:  Cardiovascular: Warm, appears well perfused   Respiratory: Easy, non-labored respiratory pattern   Abdominal: Abdomen is without distention   Extremities: Upper and lower extremities are atraumatic in appearance without deformity. No swelling or erythema

## 2024-12-08 NOTE — PROGRESS NOTES
[facility] Medicine Progress Note  V 10.25      Date of Admission: 12/6/2024    Hospital Day: 3      Chief Admission Complaint: Chest pain and shortness of breath, right-sided weakness and facial droop for 4 days    Subjective: Continues to have right-sided weakness some facial droop noted    Presenting Admission History:       Gregorio Coombs Sr. is a 64 y.o. male with a pmh of thyroid goiter, essential hypertension, alcohol use disorder, CAD, emphysema, PFO who presents with complaints of chest and back pain, SOB, right-sided weakness with facial droop for 4 days.  Patient was found to be in atrial fibrillation with RVR which is new onset.     Assessment/Plan:      Current Principal Problem:  New onset a-fib (HCC)    #Strokelike symptoms-right-sided upper extremity, lower extremity and right-sided facial droop.  On exam patient has weakness and ataxic movements of his right arm and weakness of the right leg with no ataxic movements and right-sided facial droop  -Symptom onset 4 days ago  -CT head without contrast with no acute intracranial bleed or dense stroke  -CTA head and neck was not done as patient already got IV contrast for CTA chest abdomen and pelvis  -No acute intervention for stroke given symptom onset 4 days ago  - NIHSS = 7  -MRI brain without contrast stat ordered-this has still not been done  -Neurochecks every 4 hourly  -Continue on aspirin 81 mg daily and increase the dose of atorvastatin  Per neurology obtain MRI recommending antiplatelet and statin  -PT/OT/SLP  -Check lipid panel and A1c    Patient underwent MRI this morning including MRI C and T-spine and MRI brain awaiting read  Continues to have upper extremity pain  Will add as needed nightly pain medication     #New onset A-fib with RVR  -Patient was given 18 mg of diltiazem IVP in the ED with good heart rate control  -Back in sinus rhythm now and short acting p.o. Cardizem discontinued  -Starting patient on heparin drip discussed with

## 2024-12-08 NOTE — CONSULTS
Gregorio Coombs .  12/8/2024  [id number]    Chief Complaint: New onset a-fib (HCC)    Subjective:   This is a 64-year-old male with a past medical history including:  Past Medical History:   Diagnosis Date    CAD (coronary artery disease)     Hyperlipidemia     Hypertension    Also including thyroid goiter alcohol use disorder CAD emphysema PFO came into the hospital with chest and back pain.  He also had right-sided weakness and facial droop for 4 days.  MRI Brain:  IMPRESSION:     1.  No acute stroke, mass, or hemorrhage.  2.  Mild chronic small vessel ischemic white matter disease with a few scattered tiny remote infarcts.    MRI of spine showed:    MRI CERVICAL SPINE:   1.  Multilevel degenerative disc and facet arthropathy as detailed above with mild spinal stenosis at C3-4, C4-5, C5-6, and C6-7. Mild to moderate spinal stenosis at C7-T1 without cord compression or abnormal cord signal.  2.  Severe bilateral C4, severe bilateral C5, severe bilateral C6, moderate left C7, moderate to severe right C7, and severe bilateral C8 foraminal stenosis.     MRI THORACIC SPINE:   1. Degenerative disc and facet arthropathy without spinal stenosis.  2. Moderate bilateral T1, moderate C2, and moderate right T9 foraminal stenosis.  3. Multinodular goiter with a large 4 cm left lower lobe nodule.           Patient will need to be evaluated by physical and Occupational Therapy before discharge recommendations are made.  Patient is interested in going to an acute rehab unit before planned discharge home.      ROS: No CP, SOB, dyspnea    Objective:  Patient Vitals for the past 24 hrs:   BP Temp Temp src Pulse Resp SpO2   12/08/24 0920 (!) 147/73 97.6 °F (36.4 °C) Oral 64 14 97 %   12/08/24 0443 -- -- -- -- 16 --   12/08/24 0408 124/67 98.1 °F (36.7 °C) Oral 65 15 94 %   12/07/24 2314 131/79 98.1 °F (36.7 °C) Oral 77 16 97 %   12/07/24 2020 130/87 97.8 °F (36.6 °C) Oral 65 16 96 %   12/07/24 1724 (!) 143/84 98 °F (36.7 °C) Oral

## 2024-12-09 ENCOUNTER — HOSPITAL ENCOUNTER (INPATIENT)
Dept: VASCULAR LAB | Age: 64
Discharge: HOME | DRG: 057 | End: 2024-12-11
Attending: INTERNAL MEDICINE
Payer: MEDICARE

## 2024-12-09 PROBLEM — G81.91 RIGHT HEMIPARESIS (HCC): Status: ACTIVE | Noted: 2024-12-09

## 2024-12-09 LAB
ALBUMIN SERPL-MCNC: 3.9 G/DL (ref 3.4–5)
ALBUMIN/GLOB SERPL: 1.6 {RATIO} (ref 1.1–2.2)
ALP SERPL-CCNC: 103 U/L (ref 40–129)
ALT SERPL-CCNC: 22 U/L (ref 10–40)
ANION GAP SERPL CALCULATED.3IONS-SCNC: 11 MMOL/L (ref 3–16)
AST SERPL-CCNC: 28 U/L (ref 15–37)
BASOPHILS # BLD: 0 K/UL (ref 0–0.2)
BASOPHILS NFR BLD: 0.8 %
BILIRUB SERPL-MCNC: 1 MG/DL (ref 0–1)
BUN SERPL-MCNC: 11 MG/DL (ref 7–20)
CALCIUM SERPL-MCNC: 9 MG/DL (ref 8.3–10.6)
CHLORIDE SERPL-SCNC: 103 MMOL/L (ref 99–110)
CO2 SERPL-SCNC: 22 MMOL/L (ref 21–32)
CREAT SERPL-MCNC: 1 MG/DL (ref 0.8–1.3)
DEPRECATED RDW RBC AUTO: 15.3 % (ref 12.4–15.4)
ECHO BSA: 1.83 M2
EKG ATRIAL RATE: 62 BPM
EKG DIAGNOSIS: NORMAL
EKG P AXIS: 74 DEGREES
EKG P-R INTERVAL: 138 MS
EKG Q-T INTERVAL: 436 MS
EKG QRS DURATION: 86 MS
EKG QTC CALCULATION (BAZETT): 442 MS
EKG R AXIS: 8 DEGREES
EKG T AXIS: 69 DEGREES
EKG VENTRICULAR RATE: 62 BPM
EOSINOPHIL # BLD: 0.4 K/UL (ref 0–0.6)
EOSINOPHIL NFR BLD: 6.9 %
GFR SERPLBLD CREATININE-BSD FMLA CKD-EPI: 84 ML/MIN/{1.73_M2}
GLUCOSE SERPL-MCNC: 105 MG/DL (ref 70–99)
HCT VFR BLD AUTO: 39.9 % (ref 40.5–52.5)
HGB BLD-MCNC: 13.7 G/DL (ref 13.5–17.5)
LYMPHOCYTES # BLD: 2.4 K/UL (ref 1–5.1)
LYMPHOCYTES NFR BLD: 41 %
MCH RBC QN AUTO: 33.8 PG (ref 26–34)
MCHC RBC AUTO-ENTMCNC: 34.4 G/DL (ref 31–36)
MCV RBC AUTO: 98.4 FL (ref 80–100)
MONOCYTES # BLD: 0.7 K/UL (ref 0–1.3)
MONOCYTES NFR BLD: 11.7 %
NEUTROPHILS # BLD: 2.4 K/UL (ref 1.7–7.7)
NEUTROPHILS NFR BLD: 39.6 %
PLATELET # BLD AUTO: 241 K/UL (ref 135–450)
PMV BLD AUTO: 8.1 FL (ref 5–10.5)
POTASSIUM SERPL-SCNC: 3.8 MMOL/L (ref 3.5–5.1)
PROT SERPL-MCNC: 6.4 G/DL (ref 6.4–8.2)
RBC # BLD AUTO: 4.06 M/UL (ref 4.2–5.9)
SODIUM SERPL-SCNC: 136 MMOL/L (ref 136–145)
TROPONIN, HIGH SENSITIVITY: 14 NG/L (ref 0–22)
WBC # BLD AUTO: 5.9 K/UL (ref 4–11)

## 2024-12-09 PROCEDURE — 97535 SELF CARE MNGMENT TRAINING: CPT

## 2024-12-09 PROCEDURE — 6360000002 HC RX W HCPCS: Performed by: INTERNAL MEDICINE

## 2024-12-09 PROCEDURE — 97530 THERAPEUTIC ACTIVITIES: CPT

## 2024-12-09 PROCEDURE — 6370000000 HC RX 637 (ALT 250 FOR IP): Performed by: INTERNAL MEDICINE

## 2024-12-09 PROCEDURE — 97166 OT EVAL MOD COMPLEX 45 MIN: CPT

## 2024-12-09 PROCEDURE — 84484 ASSAY OF TROPONIN QUANT: CPT

## 2024-12-09 PROCEDURE — 94640 AIRWAY INHALATION TREATMENT: CPT

## 2024-12-09 PROCEDURE — 93970 EXTREMITY STUDY: CPT | Performed by: SURGERY

## 2024-12-09 PROCEDURE — 99232 SBSQ HOSP IP/OBS MODERATE 35: CPT | Performed by: NURSE PRACTITIONER

## 2024-12-09 PROCEDURE — 93010 ELECTROCARDIOGRAM REPORT: CPT | Performed by: INTERNAL MEDICINE

## 2024-12-09 PROCEDURE — 93005 ELECTROCARDIOGRAM TRACING: CPT | Performed by: NURSE PRACTITIONER

## 2024-12-09 PROCEDURE — 93970 EXTREMITY STUDY: CPT

## 2024-12-09 PROCEDURE — 2060000000 HC ICU INTERMEDIATE R&B

## 2024-12-09 PROCEDURE — 97162 PT EVAL MOD COMPLEX 30 MIN: CPT

## 2024-12-09 PROCEDURE — 97116 GAIT TRAINING THERAPY: CPT

## 2024-12-09 PROCEDURE — 2580000003 HC RX 258: Performed by: INTERNAL MEDICINE

## 2024-12-09 PROCEDURE — 85025 COMPLETE CBC W/AUTO DIFF WBC: CPT

## 2024-12-09 PROCEDURE — 80053 COMPREHEN METABOLIC PANEL: CPT

## 2024-12-09 PROCEDURE — 6370000000 HC RX 637 (ALT 250 FOR IP): Performed by: NURSE PRACTITIONER

## 2024-12-09 PROCEDURE — 36415 COLL VENOUS BLD VENIPUNCTURE: CPT

## 2024-12-09 RX ADMIN — LISINOPRIL 2.5 MG: 2.5 TABLET ORAL at 09:48

## 2024-12-09 RX ADMIN — METHOCARBAMOL TABLETS 500 MG: 500 TABLET, COATED ORAL at 20:02

## 2024-12-09 RX ADMIN — METOPROLOL SUCCINATE 25 MG: 25 TABLET, EXTENDED RELEASE ORAL at 09:48

## 2024-12-09 RX ADMIN — APIXABAN 5 MG: 5 TABLET, FILM COATED ORAL at 20:02

## 2024-12-09 RX ADMIN — ACETAMINOPHEN 650 MG: 325 TABLET ORAL at 10:40

## 2024-12-09 RX ADMIN — APIXABAN 5 MG: 5 TABLET, FILM COATED ORAL at 09:48

## 2024-12-09 RX ADMIN — SODIUM CHLORIDE, PRESERVATIVE FREE 10 ML: 5 INJECTION INTRAVENOUS at 20:05

## 2024-12-09 RX ADMIN — ARFORMOTEROL TARTRATE: 15 SOLUTION RESPIRATORY (INHALATION) at 10:15

## 2024-12-09 RX ADMIN — OXYCODONE 5 MG: 5 TABLET ORAL at 02:53

## 2024-12-09 RX ADMIN — ACETAMINOPHEN 650 MG: 325 TABLET ORAL at 20:04

## 2024-12-09 RX ADMIN — ATORVASTATIN CALCIUM 40 MG: 40 TABLET, FILM COATED ORAL at 20:02

## 2024-12-09 RX ADMIN — METHOCARBAMOL TABLETS 500 MG: 500 TABLET, COATED ORAL at 16:39

## 2024-12-09 RX ADMIN — ARFORMOTEROL TARTRATE: 15 SOLUTION RESPIRATORY (INHALATION) at 20:35

## 2024-12-09 RX ADMIN — METHOCARBAMOL TABLETS 500 MG: 500 TABLET, COATED ORAL at 09:48

## 2024-12-09 RX ADMIN — METHOCARBAMOL TABLETS 500 MG: 500 TABLET, COATED ORAL at 12:00

## 2024-12-09 RX ADMIN — ASPIRIN 81 MG: 81 TABLET, CHEWABLE ORAL at 09:48

## 2024-12-09 RX ADMIN — TRAZODONE HYDROCHLORIDE 50 MG: 50 TABLET ORAL at 20:02

## 2024-12-09 RX ADMIN — OXYCODONE 5 MG: 5 TABLET ORAL at 20:02

## 2024-12-09 ASSESSMENT — PAIN DESCRIPTION - PAIN TYPE
TYPE: ACUTE PAIN

## 2024-12-09 ASSESSMENT — PAIN - FUNCTIONAL ASSESSMENT
PAIN_FUNCTIONAL_ASSESSMENT: ACTIVITIES ARE NOT PREVENTED

## 2024-12-09 ASSESSMENT — PAIN DESCRIPTION - DESCRIPTORS
DESCRIPTORS: ACHING;DULL
DESCRIPTORS: STABBING
DESCRIPTORS: ACHING
DESCRIPTORS: ACHING
DESCRIPTORS: SHARP

## 2024-12-09 ASSESSMENT — PAIN DESCRIPTION - LOCATION
LOCATION: HEAD
LOCATION: CHEST;ARM
LOCATION: CHEST;ARM
LOCATION: GENERALIZED
LOCATION: BACK;CHEST

## 2024-12-09 ASSESSMENT — PAIN DESCRIPTION - ORIENTATION
ORIENTATION: MID
ORIENTATION: RIGHT
ORIENTATION: MID
ORIENTATION: RIGHT;MID
ORIENTATION: RIGHT;LEFT

## 2024-12-09 ASSESSMENT — PAIN SCALES - GENERAL
PAINLEVEL_OUTOF10: 8
PAINLEVEL_OUTOF10: 3
PAINLEVEL_OUTOF10: 2
PAINLEVEL_OUTOF10: 0
PAINLEVEL_OUTOF10: 0
PAINLEVEL_OUTOF10: 8
PAINLEVEL_OUTOF10: 7
PAINLEVEL_OUTOF10: 2
PAINLEVEL_OUTOF10: 8

## 2024-12-09 ASSESSMENT — PAIN DESCRIPTION - FREQUENCY
FREQUENCY: INTERMITTENT
FREQUENCY: CONTINUOUS
FREQUENCY: CONTINUOUS
FREQUENCY: INTERMITTENT

## 2024-12-09 ASSESSMENT — PAIN DESCRIPTION - ONSET
ONSET: ON-GOING
ONSET: GRADUAL
ONSET: ON-GOING
ONSET: ON-GOING

## 2024-12-09 NOTE — PROGRESS NOTES
Occupational Therapy  Facility/Department: [address] [facility] [place]  Occupational Therapy Initial Assessment    Name: Gregorio Coombs Sr.  : 1960  MRN: [id number]  Date of Service: 2024    Discharge Recommendations:  IP Rehab  At baseline this patient was IND with functional mobility & ADL's. The current hospitalization has resulted in the patient now being limited with all aspects of mobility, negatively impacting the patient's functional independence, ability to safely access their home environment, and ability to complete valued daily tasks and life roles. Gregorio Coombs Sr. is motivated for recovery and demonstrates the ability to tolerate inpatient rehabilitation 3 hours/day, 5 days/week for optimal return to functional independence, quality of life, and decreased caregiver burden.    OT Equipment Recommendations  Equipment Needed: No       Patient Diagnosis(es): The primary encounter diagnosis was Atrial fibrillation, unspecified type (HCC). A diagnosis of PFO (patent foramen ovale) was also pertinent to this visit.  Past Medical History:  has a past medical history of CAD (coronary artery disease), Hyperlipidemia, and Hypertension.  Past Surgical History:  has no past surgical history on file.    Treatment Diagnosis: imp mob, tf, ADL      Assessment  Performance deficits / Impairments: Decreased functional mobility ;Decreased ADL status;Decreased endurance  Assessment: From shelter, admit with R side weakness and facial droop, new afib. Pt completing mobility with Liana this date requiring HHA or RW, normally IND w/o AD. Pt completing short mobility in room with assist. Assist with ADLs. Pt functioning significantly below baseline and would benefit from cont therapies while in acute care and cont inpt at ID.  Treatment Diagnosis: imp mob, tf, ADL  Decision Making: Medium Complexity  REQUIRES OT FOLLOW-UP: Yes  Activity Tolerance  Activity Tolerance: Patient Tolerated treatment well

## 2024-12-09 NOTE — PROGRESS NOTES
Gregorio Coombs .  12/9/2024  [id number]    Chief Complaint: New onset a-fib (HCC)    Subjective:   This is a 64-year-old male with a past medical history including:  Past Medical History:   Diagnosis Date    CAD (coronary artery disease)     Hyperlipidemia     Hypertension    Also including thyroid goiter alcohol use disorder CAD emphysema PFO came into the hospital with chest and back pain.  He also had right-sided weakness and facial droop for 4 days.  MRI Brain:  IMPRESSION:     1.  No acute stroke, mass, or hemorrhage.  2.  Mild chronic small vessel ischemic white matter disease with a few scattered tiny remote infarcts.    MRI of spine showed:    MRI CERVICAL SPINE:   1.  Multilevel degenerative disc and facet arthropathy as detailed above with mild spinal stenosis at C3-4, C4-5, C5-6, and C6-7. Mild to moderate spinal stenosis at C7-T1 without cord compression or abnormal cord signal.  2.  Severe bilateral C4, severe bilateral C5, severe bilateral C6, moderate left C7, moderate to severe right C7, and severe bilateral C8 foraminal stenosis.     MRI THORACIC SPINE:   1. Degenerative disc and facet arthropathy without spinal stenosis.  2. Moderate bilateral T1, moderate C2, and moderate right T9 foraminal stenosis.  3. Multinodular goiter with a large 4 cm left lower lobe nodule.           Patient will need to be evaluated by physical and Occupational Therapy before discharge recommendations are made.  Patient is interested in going to an acute rehab unit before planned discharge home.      Interval history: Seen in his room this morning.  [place] and is doing fairly well with therapy.  He is agreeable to go to an acute rehab unit.  He would like to go to [facility] as this is the closest rehab unit to his girlfriend's house.      ROS: No CP, SOB, dyspnea    Objective:  Patient Vitals for the past 24 hrs:   BP Temp Temp src Pulse Resp SpO2 Weight   12/09/24 1157 132/63 97.7 °F (36.5 °C)  native heart without angina pectoris    Other emphysema (HCC)- seen on CT scan     PFO (patent foramen ovale)    Respiratory failure with hypoxia    Hypomagnesemia    Thyroid nodule    Elevated troponin    New onset a-fib (HCC)    Coronary artery calcification    Atrial fibrillation (HCC)       Plan:   Appropriate for inpatient rehab  Defer placement to case management  Patient would like to go to [facility] for ARU needs      Thank you for this consult. Please contact me with any questions or concerns.      Bert Mary D.O. M.P.H  PM&R  12/9/2024  12:09 PM      * This document was created using dictation software.  While all precautions were taken to ensure accuracy, errors may have occurred.  Please disregard any typographical errors.

## 2024-12-09 NOTE — PROGRESS NOTES
NEUROLOGY PROGRESS NOTE       Patient Name: Gregorio Coombs SrAlexandre YOB: 1960   Sex: Male Age: 64 yrs     Presenting CC:   Chief Complaint   Patient presents with    Extremity Weakness     Weakness of r arm began last Thursday     Chest Pain     Pt endorses R sided chest and back pain     Reason for Consult: stroke    Changes over last 24 hours:   MRI brain w/ & w/o contrast shows no stroke or abnormal enhancement  MRI C/T w/ & w/o contrast - no cord impingement, no abnormal enhancement    ROS: continued R hemiparesis, no dysarthria    ASSESSMENT & RECOMMENDATIONS   Assessment:  Gregorio Coombs is a 65 y/o man with new onset atrial fibrillation and 6 days of right hemiparesis (most notable in right hand) and neck pain. He reports pain in his right upper limb and lower limb as well which would not be consistent with ischemic stroke.     MRI brain w/ & w/o contrast shows no stroke or abnormal enhancement  MRI C/T w/ & w/o contrast - no cord impingement, no abnormal enhancement    Plan:  - Okay to resume anticoagulation - okay to stop ASA once OAC is resumed  - Still no clear reason for R hemiparesis - migraine v seizure v functional  - Unilateral weakness symptoms would not fit w/ an AIDP. The duration of symptoms would be atypical for migraine or seizure.   - Next best step will be outpatient EMG  - f/u w/ neurosurgery for non-acute findings on MRI cervical spine'  - We will sign off. Please call with questions.     D/w Dr. Minda Castro, APRN - [facility]   Neurology  12/9/2024 2:11 PM  [employer]: [facility] Neurology    HISTORY     Initial HPI: 12/7  History of Present Illness:  Gregorio Coombs Sr. is a 64 y.o. male who presents with stroke  Location: right arm and leg  Quality: weak  Onset: abrupt  Course: ongoing  Frequency: once  Duration: 12/2/24 onset  Modifying factors: none  Associated symptoms: poor coordination and ataxia     He has history of PFO, CAD, HTN, and was found to have new  voice  Motor Exam:   R  L    Deltoid 4 5   Biceps 4 5   Triceps 4 5   Wrist extension  4 5   Interossei 4 5      R  L    Hip flexion  5 5   Hip extension  5 5   Knee flexion  5 5   Knee extension  5 5   Ankle dorsiflexion  5 5   Ankle plantar flexion  5 5     Sensory: light touch intact and symmetric in all 4 extremities.    Cerebellar/coordination: finger nose finger normal without ataxia  Tone: normal in all 4 extremities  Gait: deferred for safety    OTHER SYSTEMS:  Cardiovascular: Warm, appears well perfused   Respiratory: Easy, non-labored respiratory pattern   Abdominal: Abdomen is without distention   Extremities: Upper and lower extremities are atraumatic in appearance without deformity. No swelling or erythema

## 2024-12-09 NOTE — CARE COORDINATION
Addendum at 3:38pm: [facility] unable to accept due to MD oversight.     Updated pt at bedside. Discussed other ARU's from the list at bedside. He stated referral to [facility] or [facility] whichever one is closer to [facility].    Referral called to [facility] liaison via secure voicemail.     Addendum at 2:38pm: Follow up call placed to [facility] and spoke with rosita Monique whom states she hasn't looked at the referral but did receive message. She said she would call writer once reviewed. Aware to start cert if able to accept. Direct number: [phone]    Addendum at 12:52pm: No bed at [facility]. Met with pt and updated pt. Provided ARU list and pt requested referral to [facility].     Called [facility] at [phone] and referral left on Eneida's secure voicemail with request for a call back.     10:50am: Case Management Assessment  Initial Evaluation    Date/Time of Evaluation: 12/9/2024 10:50 AM  Assessment Completed by: STEPHEN Del Valle   for [facility] [place] ([facility])  [employer]: [phone]    If patient is discharged prior to next notation, then this note serves as note for discharge by case management.    Patient Name: Gregorio Coombs Sr.                   YOB: 1960  Diagnosis: New onset a-fib (HCC) [I48.91]                   Date / Time: 12/6/2024  2:04 PM    Patient Admission Status: Inpatient   Readmission Risk (Low < 19, Mod (19-27), High > 27): Readmission Risk Score: 12    Current PCP: Olena Morrissey APRN - CNP  PCP verified by CM? Yes    Chart Reviewed: Yes      History Provided by: Patient  Patient Orientation: Alert and Oriented    Patient Cognition: Alert    Hospitalization in the last 30 days (Readmission):  Yes    If yes, Readmission Assessment in CM Navigator will be completed.    Advance Directives:      Code Status: Full Code   Patient's Primary Decision Maker is: Legal Next of Kin (he states his son

## 2024-12-09 NOTE — PLAN OF CARE
tolerance for standing, transferring and ambulating with or without assistive devices  Taken 12/9/2024 3386  Return Mobility to Safest Level of Function: Assess patient stability and activity tolerance for standing, transferring and ambulating with or without assistive devices

## 2024-12-09 NOTE — PROGRESS NOTES
Pain Progress Note    Data:  Patient noted pain radiating down in right arm. Also, patient noted they had some numbness in right arm as well. Patient stated numbness feeling in right arm was going away. Patient's current vitals are Temp: 98.0, BP: 112/73 with MAP of 85, HR: 65, SpO2: 97% on room air, and RR: 12.     Action:  This RN notified on call NP via [employer]. Orders were placed for a STAT EKG, troponin labs, CBC with Auto Differential, and CMP labs. PRN pain medication was given per MAR. See MAR for further details.     Response:  This RN reassessed patient's pain level and sensation on right arm. Patient noted not having any pain or numbness feeling in right arm. Plan of care ongoing.

## 2024-12-09 NOTE — PROGRESS NOTES
WFL at this time. GOAL MET. D/C from dysphagia services.     Speech Goals:  The pt will be seen  1-3 x wk to address the following goals:   Pt will be comprehensible in optimal speaking environment for conversational level expression without cues.   12/9 - Pt endorses he is back to his communicative baseline (no dentition at baseline). The patient's dysphonia is resolved without vocal strain or pitch breaks previously noted and no breathlessness with conversation exchanges. Pt in agreement that he does not need speech therapy at this time. D/C Goal.   Pt will verbalize increased loudness as strategy for improved intelligibility. - Not indicated at this time.   Pt will tolerate ongoing cognitive-linguistic assessments as clinically indicated.  - not indicated at this time; educated pt that rehab facility will likely complete comprehensive evaluation of cognitive-linguistic skills at admission.     Education  Education as above re: discharge from services, significantly improved speech and voice at this time and rec to DC from services and no exercises completed this date due to this.     Pt goal: Did not state  Pt discharge goal: Did not state    Total treatment time: <8 minutes, no charges rendered    Plan: Continue per POC  Recommended Diet: Regular / Thins  Medication administration: Whole with liquid    Strategies:   Small bites, reduced rate of intake  Discharge Recommendation: Not indicated at this time; Pt endorses he is at his communicative baseline.   Discussed with RNMonica  Needs met prior to leaving room, call light within reach    Carlene Erwin M.A., CCC-SLP [id number]  Speech-Language Pathologist  This document will serve as a dc summary if pt dc prior to next visit

## 2024-12-09 NOTE — PROGRESS NOTES
[facility] - Acute Rehab Unit   After review, this patient is felt to be:       [x]  Dr. Mary states this patient is appropriate for rehab.     []  Not appropriate for Acute Inpatient Rehab    []  Referral received and ARU reviewing patient      Pt is a good candidate for acute rehab, but due to no bed availability at [facility] at this time please refer pt to [facility], [facility], or [facility].     Will notify CM/SW with further updates. Thank you for the referral.    Paris WOOD OTR/L  Clinical Liaison- [facility]   (P): [phone]  (F): [phone]

## 2024-12-09 NOTE — PROGRESS NOTES
Physical Therapy  Facility/Department: [address]  Physical Therapy Initial Assessment/treatment note    Name: Gregorio Coombs Sr.  : 1960  MRN: [id number]  Date of Service: 2024    Discharge Recommendations:  IP Rehab   PT Equipment Recommendations  Equipment Needed: No      Patient Diagnosis(es): The primary encounter diagnosis was Atrial fibrillation, unspecified type (HCC). A diagnosis of PFO (patent foramen ovale) was also pertinent to this visit.  Past Medical History:  has a past medical history of CAD (coronary artery disease), Hyperlipidemia, and Hypertension.  Past Surgical History:  has no past surgical history on file.    Assessment  Assessment: 65 yo admitted for new onset afib/CVA symptoms. Pt demo mobility below his reported baseline of independent in homeless shelter without AD. Pt required min assist and use of RW for mobility this session. Recommend ARU at discharge to maximize his functional independence and he is in agreement. Discussed with RN and CM.  Treatment Diagnosis: mobility impairment due to CVA  Decision Making: Medium Complexity  Requires PT Follow-Up: Yes  Activity Tolerance  Activity Tolerance: Patient tolerated evaluation without incident;Patient limited by fatigue;Patient limited by endurance    Plan  Physical Therapy Plan  General Plan:  (5-7)  Current Treatment Recommendations: Transfer training, Functional mobility training, Gait training  Safety Devices  Type of Devices: Chair alarm in place, Gait belt, Left in chair, Nurse notified    Restrictions  Position Activity Restriction  Other Position/Activity Restrictions: up as tolerated     Subjective  General  Chart Reviewed: Yes  Additional Pertinent Hx: 64 y.o. male with a pmh of thyroid goiter, essential hypertension, alcohol use disorder, CAD, emphysema, PFO who presented toED 24 with complaints of chest and back pain, SOB, right-sided weakness with facial droop for 4 days.  Patient was found  to be in atrial fibrillation with RVR which is new onset. Neuro consult; brain MRI and Head CT neg; MRI T and C spine positive for multi level spinal stenosis.  Family/Caregiver Present: No  Diagnosis: new onset afib  Follows Commands: Within Functional Limits  Subjective  Subjective: Pt found supine in bed and agreeable to PT.         Social/Functional History  Social/Functional History  Type of Home: Homeless (staying at a shelter)  Home Access: Level entry  Bathroom Shower/Tub:  (community shower stalls- they have handicap bathrooms available)  Home Equipment: None  Has the patient had two or more falls in the past year or any fall with injury in the past year?: No  Prior Level of Assist for ADLs: Independent  Prior Level of Assist for Homemaking: Independent  Prior Level of Assist for Ambulation: Independent household ambulator, with or without device  Prior Level of Assist for Transfers: Independent  Active : No  Additional Comments: shelter is trying to find him housing    Vision/Hearing  Vision  Vision: Within Functional Limits  Hearing  Hearing: Within functional limits    Cognition     Orientation  Overall Orientation Status: Within Functional Limits  Cognition  Overall Cognitive Status: WFL    Objective       Gross Assessment  Coordination:  (R UE/LE decreased)  Sensation: Intact    AROM RLE (degrees)  RLE AROM: WFL  AROM LLE (degrees)  LLE AROM : WFL    Strength RLE  Strength RLE:  (hip flexion/knee extension 3+/5; ankle DF/PF 2+/5)  Strength LLE  Strength LLE: WFL          Bed mobility  Supine to Sit: Supervision    Transfers  Sit to Stand: Minimal Assistance (with max vc for hand placement; x3 trials)  Stand to Sit: Minimal Assistance (with max vc for hand placement; x3 trials)    Ambulation  Device: Rolling Walker;No Device  Assistance: Minimal assistance  Quality of Gait: slow ani with decreased step length/height on R LE; pt reaching out for UE support without RW and max vc initially for

## 2024-12-09 NOTE — PLAN OF CARE
Problem: Pain  Goal: Verbalizes/displays adequate comfort level or baseline comfort level  Outcome: Progressing  Verbalizes/displays adequate comfort level or baseline comfort level:   Encourage patient to monitor pain and request assistance   Administer analgesics based on type and severity of pain and evaluate response   Consider cultural and social influences on pain and pain management   Assess pain using appropriate pain scale   Implement non-pharmacological measures as appropriate and evaluate response   Notify Licensed Independent Practitioner if interventions unsuccessful or patient reports new pain  Note: Patient noted pain during this shift. PRN pain medication given per MAR. See MAR for further details.      Problem: Neurosensory - Adult  Goal: Achieves stable or improved neurological status  Outcome: Progressing  Achieves stable or improved neurological status:   Assess for and report changes in neurological status   Initiate measures to prevent increased intracranial pressure   Maintain blood pressure and fluid volume within ordered parameters to optimize cerebral perfusion and minimize risk of hemorrhage   Monitor temperature, glucose, and sodium. Initiate appropriate interventions as ordered      Problem: Respiratory - Adult  Goal: Achieves optimal ventilation and oxygenation  Outcome: Progressing  Achieves optimal ventilation and oxygenation:   Assess for changes in respiratory status   Position to facilitate oxygenation and minimize respiratory effort   Initiate smoking cessation protocol as indicated   Assess the need for suctioning and aspirate as needed   Respiratory therapy support as indicated   Assess for changes in mentation and behavior   Oxygen supplementation based on oxygen saturation or arterial blood gases   Encourage broncho-pulmonary hygiene including cough, deep breathe, incentive spirometry   Assess and instruct to report shortness of breath or any respiratory  difficulty      Problem: Cardiovascular - Adult  Goal: Maintains optimal cardiac output and hemodynamic stability  Outcome: Progressing  Maintains optimal cardiac output and hemodynamic stability:   Monitor blood pressure and heart rate   Monitor urine output and notify Licensed Independent Practitioner for values outside of normal range   Assess for signs of decreased cardiac output   Administer fluid and/or volume expanders as ordered   Administer vasoactive medications as ordered   For PPHN infants, administer sedation as ordered and minimize all controllable stressors.      Problem: Skin/Tissue Integrity - Adult  Goal: Skin integrity remains intact  Outcome: Progressing  Skin Integrity Remains Intact:   Monitor for areas of redness and/or skin breakdown   Assess vascular access sites hourly   Every 4-6 hours minimum: Change oxygen saturation probe site   Every 4-6 hours: If on nasal continuous positive airway pressure, respiratory therapy assesses nares and determine need for appliance change or resting period  Note: No new skin breakdown noted during this shift.       Problem: Musculoskeletal - Adult  Goal: Return mobility to safest level of function  Outcome: Progressing  Return Mobility to Safest Level of Function:   Assess patient stability and activity tolerance for standing, transferring and ambulating with or without assistive devices   Assist with transfers and ambulation using safe patient handling equipment as needed   Ensure adequate protection for wounds/incisions during mobilization   Obtain physical therapy/occupational therapy consults as needed   Apply continuous passive motion per provider or physical therapy orders to increase flexion toward goal   Instruct patient/family in ordered activity level      Problem: Metabolic/Fluid and Electrolytes - Adult  Goal: Electrolytes maintained within normal limits  Outcome: Progressing  Electrolytes maintained within normal limits:   Monitor labs and

## 2024-12-09 NOTE — PROGRESS NOTES
Hospital Medicine Progress Note  V 10.25      Date of Admission: 12/6/2024    Hospital Day: 4      Chief Admission Complaint: Chest pain and shortness of breath, right-sided weakness and facial droop for 4 days    Subjective: Continues to have right-sided weakness some facial droop noted    Presenting Admission History:       Gregorio Coombs Sr. is a 64 y.o. male with a pmh of thyroid goiter, essential hypertension, alcohol use disorder, CAD, emphysema, PFO who presents with complaints of chest and back pain, SOB, right-sided weakness with facial droop for 4 days.  Patient was found to be in atrial fibrillation with RVR which is new onset.     Assessment/Plan:      Current Principal Problem:  New onset a-fib (HCC)    #Strokelike symptoms-right-sided upper extremity, lower extremity and right-sided facial droop.  On exam patient has weakness and ataxic movements of his right arm and weakness of the right leg with no ataxic movements and right-sided facial droop  -Symptom onset 4 days ago  -CT head without contrast with no acute intracranial bleed or dense stroke  -CTA head and neck was not done as patient already got IV contrast for CTA chest abdomen and pelvis  -No acute intervention for stroke given symptom onset 4 days ago  - NIHSS = 7  -MRI brain without contrast stat ordered-this has still not been done  -Neurochecks every 4 hourly  -Continue on aspirin 81 mg daily and increase the dose of atorvastatin  Per neurology obtain MRI recommending antiplatelet and statin  -PT/OT/SLP  -Check lipid panel and A1c    Patient underwent MRI this morning including MRI C and T-spine and MRI brain awaiting read  Continues to have upper extremity pain  Will add as needed nightly pain medication    MRI brain has been negative for acute stroke per neurology patient can resume anticoagulation which was started on Eliquis 5 mg twice daily patient does not need aspirin.  He will need neuromuscular workup as an outpatient     #New  for: \"BLOODCULT2\"  Organism: No results found for: \"ORG\"      Elizabeth Lake, APRN - CNP

## 2024-12-09 NOTE — PROGRESS NOTES
[facility] Cardiology Progress Note    Primary Cardiologist: Dr Garcia    CC/HPI: AF and weakness    Gregorio Coombs Sr. is a 64 y.o. male with a past medical history of hypertension, atherosclerotic heart disease, alcohol use disorder who presented to the hospital with right-sided weakness and shortness of breath.  Found to have atrial fibrillation on admission.  Currently back in sinus rhythm.  States that he is unable to open his fingers completely.  Has history of alcohol use disorder.  No bleeding.  No abdominal pain.  No visual symptoms.  No syncope.     S:  C/o sharp pain in between shoulder blades radiating into the chest and right arm and right leg. C/o right sided weakness.     Tele: Sinus, sinus bradycardia     O:  Physical Exam:  /71   Pulse 62   Temp 97.9 °F (36.6 °C) (Oral)   Resp 11   Ht 1.702 m (5' 7\")   Wt 70 kg (154 lb 6.4 oz)   SpO2 97%   BMI 24.18 kg/m²    General (appearance):  No acute distress  Eyes: anicteric   Neck: soft, No JVD  Ears/Nose/Mouth/Thorat: No cyanosis  CV: RRR   Respiratory:  normal effort  GI: soft, non-tender, non-distended  Skin: Warm, dry. No rashes  Neuro/Psych: Alert and oriented x 3. Appropriate behavior  Ext:  No c/c.     I.O's=     Weight  Admission: Weight - Scale: 71.2 kg (157 lb)   Today: Weight - Scale: 70 kg (154 lb 6.4 oz)    CBC:   Recent Labs     12/06/24  1431 12/09/24  0351   WBC 5.9 5.9   HGB 14.3 13.7   HCT 42.0 39.9*   MCV 97.5 98.4    241     BMP:   Recent Labs     12/06/24  1431 12/06/24  1937 12/07/24  0356 12/09/24  0351   *  --  137 136   K see below 3.9 4.0 3.8     --  103 103   CO2 19*  --  23 22   BUN 9  --  10 11   CREATININE 0.8  --  0.8 1.0     Estimated Creatinine Clearance: 70 mL/min (based on SCr of 1 mg/dL).  Mag:   Lab Results   Component Value Date/Time    MG 1.60 08/03/2022 05:28 AM     LIVER PROFILE:   Recent Labs     12/09/24  0351   AST 28   ALT 22   BILITOT 1.0   ALKPHOS 103      Pro-BNP:   Lab Results   Component Value Date/Time    PROBNP <36 2024 02:31 PM    PROBNP <36 2024 09:29 AM    PROBNP 27 2022 03:41 AM    PROBNP 33 2021 10:50 AM    PROBNP 9 2020 09:58 AM     High Sensitivity Troponin:   Lab Results   Component Value Date    CKTOTAL 275 2020    TROPONINI <0.01 09/15/2022    TROPHS 14 2024       Imagin2024 CTA chest  1. No evidence for acute aortic process   2. Coronary artery calcifications  3. Prominent prostate  4. Dominant large left thyroid nodule noted on prior ultrasound 2024 Nuc stress  This is a negative stress study.  Myocardial perfusion imaging shows uniform tracer uptake with no reversibility/ischemia  Gated study shows normal LV size wall motion with EF of greater than 70%  EKG further test is negative for ischemia     [facility]  1.  Moderate to heavy calcification of the left main trunk which is  widely patent.  2.  Mild to moderate calcification of the proximal and the mid LAD with  patent LAD and 30% diagonal stenosis.  3.  Patent circumflex artery and its branches.  4.  A 30% stenosis of the distal right coronary artery with evidence of  mild calcification.  5.  Preserved left ventricular systolic function with normal left heart  hemodynamics.    Assessment:  pAF: now sinus  CVA  ASHD, coronary calcification     Plan:  -Keep K>4, Mg>2., Hgb> 7-8  -Start AC when ok with neurology (eliquis 5 mg po bid )  -Toprol 25 mg po daily   -ASA 81 mg daily  -Lipitor 40 mg po daily  -Lisinopril 2.5 mg po daily     Please call the nurse navigator at [phone] during the weekdays  Please call the office at [phone] after hours and weekends.   -8

## 2024-12-10 PROCEDURE — 6370000000 HC RX 637 (ALT 250 FOR IP): Performed by: INTERNAL MEDICINE

## 2024-12-10 PROCEDURE — 6370000000 HC RX 637 (ALT 250 FOR IP): Performed by: NURSE PRACTITIONER

## 2024-12-10 PROCEDURE — 97530 THERAPEUTIC ACTIVITIES: CPT

## 2024-12-10 PROCEDURE — 2580000003 HC RX 258: Performed by: INTERNAL MEDICINE

## 2024-12-10 PROCEDURE — 6360000002 HC RX W HCPCS: Performed by: INTERNAL MEDICINE

## 2024-12-10 PROCEDURE — 94761 N-INVAS EAR/PLS OXIMETRY MLT: CPT

## 2024-12-10 PROCEDURE — 97110 THERAPEUTIC EXERCISES: CPT

## 2024-12-10 PROCEDURE — 2060000000 HC ICU INTERMEDIATE R&B

## 2024-12-10 PROCEDURE — 94640 AIRWAY INHALATION TREATMENT: CPT

## 2024-12-10 PROCEDURE — 99232 SBSQ HOSP IP/OBS MODERATE 35: CPT | Performed by: NURSE PRACTITIONER

## 2024-12-10 RX ORDER — OXYCODONE HYDROCHLORIDE 5 MG/1
5 TABLET ORAL NIGHTLY PRN
Qty: 3 TABLET | Refills: 0 | Status: SHIPPED | OUTPATIENT
Start: 2024-12-10 | End: 2024-12-13

## 2024-12-10 RX ORDER — TRAMADOL HYDROCHLORIDE 50 MG/1
50 TABLET ORAL EVERY 8 HOURS PRN
Status: DISCONTINUED | OUTPATIENT
Start: 2024-12-10 | End: 2024-12-12 | Stop reason: HOSPADM

## 2024-12-10 RX ORDER — LIDOCAINE 4 G/G
1 PATCH TOPICAL DAILY
Qty: 10 EACH | Refills: 0 | Status: SHIPPED | OUTPATIENT
Start: 2024-12-11

## 2024-12-10 RX ORDER — ATORVASTATIN CALCIUM 40 MG/1
40 TABLET, FILM COATED ORAL NIGHTLY
Qty: 30 TABLET | Refills: 3 | Status: SHIPPED | OUTPATIENT
Start: 2024-12-10

## 2024-12-10 RX ORDER — TRAMADOL HYDROCHLORIDE 50 MG/1
50 TABLET ORAL EVERY 8 HOURS PRN
Qty: 21 TABLET | Refills: 0 | Status: SHIPPED | OUTPATIENT
Start: 2024-12-10 | End: 2024-12-17

## 2024-12-10 RX ORDER — METHOCARBAMOL 500 MG/1
500 TABLET, FILM COATED ORAL 4 TIMES DAILY
Qty: 40 TABLET | Refills: 0 | Status: SHIPPED | OUTPATIENT
Start: 2024-12-10 | End: 2024-12-20

## 2024-12-10 RX ORDER — POLYETHYLENE GLYCOL 3350 17 G/17G
17 POWDER, FOR SOLUTION ORAL DAILY PRN
Qty: 30 PACKET | Refills: 0 | Status: SHIPPED | OUTPATIENT
Start: 2024-12-10 | End: 2025-01-09

## 2024-12-10 RX ORDER — METOPROLOL SUCCINATE 25 MG/1
25 TABLET, EXTENDED RELEASE ORAL DAILY
Qty: 30 TABLET | Refills: 3 | Status: SHIPPED | OUTPATIENT
Start: 2024-12-11

## 2024-12-10 RX ADMIN — METHOCARBAMOL TABLETS 500 MG: 500 TABLET, COATED ORAL at 12:34

## 2024-12-10 RX ADMIN — LISINOPRIL 2.5 MG: 2.5 TABLET ORAL at 08:58

## 2024-12-10 RX ADMIN — METHOCARBAMOL TABLETS 500 MG: 500 TABLET, COATED ORAL at 20:23

## 2024-12-10 RX ADMIN — ACETAMINOPHEN 650 MG: 325 TABLET ORAL at 12:34

## 2024-12-10 RX ADMIN — OXYCODONE 5 MG: 5 TABLET ORAL at 20:23

## 2024-12-10 RX ADMIN — METOPROLOL SUCCINATE 25 MG: 25 TABLET, EXTENDED RELEASE ORAL at 08:58

## 2024-12-10 RX ADMIN — METHOCARBAMOL TABLETS 500 MG: 500 TABLET, COATED ORAL at 08:58

## 2024-12-10 RX ADMIN — ARFORMOTEROL TARTRATE: 15 SOLUTION RESPIRATORY (INHALATION) at 09:07

## 2024-12-10 RX ADMIN — ASPIRIN 81 MG: 81 TABLET, CHEWABLE ORAL at 08:59

## 2024-12-10 RX ADMIN — APIXABAN 5 MG: 5 TABLET, FILM COATED ORAL at 08:58

## 2024-12-10 RX ADMIN — TRAZODONE HYDROCHLORIDE 50 MG: 50 TABLET ORAL at 20:23

## 2024-12-10 RX ADMIN — SODIUM CHLORIDE, PRESERVATIVE FREE 10 ML: 5 INJECTION INTRAVENOUS at 20:25

## 2024-12-10 RX ADMIN — ATORVASTATIN CALCIUM 40 MG: 40 TABLET, FILM COATED ORAL at 20:23

## 2024-12-10 RX ADMIN — METHOCARBAMOL TABLETS 500 MG: 500 TABLET, COATED ORAL at 18:03

## 2024-12-10 RX ADMIN — APIXABAN 5 MG: 5 TABLET, FILM COATED ORAL at 20:23

## 2024-12-10 RX ADMIN — ACETAMINOPHEN 650 MG: 325 TABLET ORAL at 20:23

## 2024-12-10 RX ADMIN — TRAMADOL HYDROCHLORIDE 50 MG: 50 TABLET, COATED ORAL at 15:40

## 2024-12-10 ASSESSMENT — PAIN - FUNCTIONAL ASSESSMENT
PAIN_FUNCTIONAL_ASSESSMENT: ACTIVITIES ARE NOT PREVENTED

## 2024-12-10 ASSESSMENT — PAIN DESCRIPTION - DESCRIPTORS
DESCRIPTORS: ACHING
DESCRIPTORS: ACHING;DULL;THROBBING

## 2024-12-10 ASSESSMENT — PAIN DESCRIPTION - LOCATION
LOCATION: BACK;CHEST
LOCATION: ARM
LOCATION: BACK;CHEST;ARM
LOCATION: BACK;CHEST;ARM

## 2024-12-10 ASSESSMENT — PAIN DESCRIPTION - FREQUENCY
FREQUENCY: INTERMITTENT

## 2024-12-10 ASSESSMENT — PAIN DESCRIPTION - ONSET
ONSET: ON-GOING

## 2024-12-10 ASSESSMENT — PAIN SCALES - GENERAL
PAINLEVEL_OUTOF10: 8
PAINLEVEL_OUTOF10: 4
PAINLEVEL_OUTOF10: 2
PAINLEVEL_OUTOF10: 8
PAINLEVEL_OUTOF10: 2
PAINLEVEL_OUTOF10: 5

## 2024-12-10 ASSESSMENT — PAIN DESCRIPTION - ORIENTATION
ORIENTATION: RIGHT;MID
ORIENTATION: RIGHT
ORIENTATION: RIGHT;MID
ORIENTATION: RIGHT;UPPER;MID

## 2024-12-10 ASSESSMENT — PAIN DESCRIPTION - PAIN TYPE
TYPE: ACUTE PAIN

## 2024-12-10 NOTE — PLAN OF CARE
Problem: Pain  Goal: Verbalizes/displays adequate comfort level or baseline comfort level  Outcome: Progressing     Problem: Neurosensory - Adult  Goal: Achieves stable or improved neurological status  Outcome: Progressing     Problem: Skin/Tissue Integrity - Adult  Goal: Skin integrity remains intact  Outcome: Progressing     Problem: Musculoskeletal - Adult  Goal: Return mobility to safest level of function  Outcome: Progressing     Problem: Safety - Adult  Goal: Free from fall injury  Outcome: Progressing

## 2024-12-10 NOTE — PROGRESS NOTES
Occupational Therapy  Facility/Department: [address] CANCER [place]  Occupational Therapy Treatment Note    Name: Gregorio Coombs Sr.  : 1960  MRN: [id number]  Date of Service: 12/10/2024    Discharge Recommendations:  IP Rehab  OT Equipment Recommendations  Equipment Needed: No       Patient Diagnosis(es): The primary encounter diagnosis was Atrial fibrillation, unspecified type (HCC). Diagnoses of PFO (patent foramen ovale) and Right hemiparesis (HCC) were also pertinent to this visit.  Past Medical History:  has a past medical history of CAD (coronary artery disease), Hyperlipidemia, and Hypertension.  Past Surgical History:  has no past surgical history on file.    Treatment Diagnosis: imp mob, tf, ADL      Assessment  Performance deficits / Impairments: Decreased functional mobility ;Decreased ADL status;Decreased endurance  Assessment: At rest, pt keeps R wrist and fingers flexed.  Whenever he tries to move his UE it tremors and he reports pain radiating from neck down extremity.  Pt educated in verbal/written HEP for R hand-stretching fingers to neutral, extending wrist, flex/ext thumb, and thumb circles to R and L.  He verbalized and demonstrated understanding.  Pt needs CGA for functional transfers with use of rolling walker, cues for safety.  Recommend ARU at discharge to maximize his functional indep with all tasks.  Treatment Diagnosis: imp mob, tf, ADL  REQUIRES OT FOLLOW-UP: Yes  Activity Tolerance  Activity Tolerance: Patient Tolerated treatment well     Plan  Occupational Therapy Plan  Times Per Week: 5-7  Current Treatment Recommendations: Balance training, Functional mobility training, Endurance training, Self-Care / ADL    Restrictions  Position Activity Restriction  Other Position/Activity Restrictions: up as tolerated    Subjective  General  Chart Reviewed: Yes  Additional Pertinent Hx: 64 y.o. male with a pmh of thyroid goiter, essential hypertension, alcohol use disorder, CAD,

## 2024-12-10 NOTE — CARE COORDINATION
Addendum at 1:15pm: Spoke with pt at bedside. He confirmed agreement with [facility] and states someone already came to update him. He is aware if denied can look at a SNF. He is hopeful for [facility] then return to the shelter. He said he has a  there and they will need to be updated on final plan. He said that they are aware that he is in the hospital.    Addendum at 11:09am: Spoke with CNP while on the unit who is aware precert is started for an ARU. Per Elizabeth JURADO, pt is ready for discharge.     Updated Carlos A at [facility] who states will start precert for [facility].    7:34am: Received voicemail from Carlos A with [facility] yesterday after writer left for the day stating clinically they can accept pt and will start precert this AM.     Raven ALLEN, DIXIE-S   for [facility] ([facility])  [employer] Mobile: [phone]

## 2024-12-10 NOTE — PROGRESS NOTES
[facility] Cardiology Progress Note    Primary Cardiologist: Dr Garcia    CC/HPI: AF and weakness    Gregorio Coombs Sr. is a 64 y.o. male with a past medical history of hypertension, atherosclerotic heart disease, alcohol use disorder who presented to the hospital with right-sided weakness and shortness of breath.  Found to have atrial fibrillation on admission.  Currently back in sinus rhythm.  States that he is unable to open his fingers completely.  Has history of alcohol use disorder.  No bleeding.  No abdominal pain.  No visual symptoms.  No syncope.     S:  No events overnight.     Tele: Sinus, sinus bradycardia     O:  Physical Exam:  /72   Pulse 63   Temp 98.1 °F (36.7 °C) (Oral)   Resp 12   Ht 1.702 m (5' 7\")   Wt 70 kg (154 lb 6.4 oz)   SpO2 100%   BMI 24.18 kg/m²    General (appearance):  No acute distress  Eyes: anicteric   Neck: soft, No JVD  Ears/Nose/Mouth/Thorat: No cyanosis  CV: RRR   Respiratory:  normal effort  GI: soft, non-tender, non-distended  Skin: Warm, dry. No rashes  Neuro/Psych: Alert and oriented x 3. Appropriate behavior  Ext:  No c/c.     I.O's=     Weight  Admission: Weight - Scale: 71.2 kg (157 lb)   Today: Weight - Scale: 70 kg (154 lb 6.4 oz)    CBC:   Recent Labs     12/09/24  0351   WBC 5.9   HGB 13.7   HCT 39.9*   MCV 98.4        BMP:   Recent Labs     12/09/24  0351      K 3.8      CO2 22   BUN 11   CREATININE 1.0     Estimated Creatinine Clearance: 70 mL/min (based on SCr of 1 mg/dL).  Mag:   Lab Results   Component Value Date/Time    MG 1.60 08/03/2022 05:28 AM     LIVER PROFILE:   Recent Labs     12/09/24  0351   AST 28   ALT 22   BILITOT 1.0   ALKPHOS 103     Pro-BNP:   Lab Results   Component Value Date/Time    PROBNP <36 12/06/2024 02:31 PM    PROBNP <36 11/19/2024 09:29 AM    PROBNP 27 08/02/2022 03:41 AM    PROBNP 33 09/26/2021 10:50 AM    PROBNP 9 06/04/2020 09:58 AM     High Sensitivity Troponin:   Lab

## 2024-12-10 NOTE — PLAN OF CARE
Problem: Pain  Goal: Verbalizes/displays adequate comfort level or baseline comfort level  12/10/2024 0020 by Zaira Browne RN  Outcome: Progressing  Flowsheets (Taken 12/9/2024 2015)  Verbalizes/displays adequate comfort level or baseline comfort level:   Encourage patient to monitor pain and request assistance   Assess pain using appropriate pain scale  12/9/2024 1321 by Carley Ha RN  Outcome: Progressing  Flowsheets (Taken 12/9/2024 1321)  Verbalizes/displays adequate comfort level or baseline comfort level:   Encourage patient to monitor pain and request assistance   Assess pain using appropriate pain scale   Administer analgesics based on type and severity of pain and evaluate response     Problem: Neurosensory - Adult  Goal: Achieves stable or improved neurological status  12/10/2024 0020 by Zaira Browne RN  Outcome: Progressing  Flowsheets (Taken 12/9/2024 2015)  Achieves stable or improved neurological status: Assess for and report changes in neurological status  12/9/2024 1321 by Carley Ha RN  Outcome: Progressing  Flowsheets  Taken 12/9/2024 1321  Achieves stable or improved neurological status:   Assess for and report changes in neurological status   Initiate measures to prevent increased intracranial pressure   Maintain blood pressure and fluid volume within ordered parameters to optimize cerebral perfusion and minimize risk of hemorrhage   Monitor temperature, glucose, and sodium. Initiate appropriate interventions as ordered  Taken 12/9/2024 0940  Achieves stable or improved neurological status:   Assess for and report changes in neurological status   Initiate measures to prevent increased intracranial pressure   Maintain blood pressure and fluid volume within ordered parameters to optimize cerebral perfusion and minimize risk of hemorrhage   Monitor temperature, glucose, and sodium. Initiate appropriate interventions as ordered  Goal: Absence of seizures  12/10/2024 0020 by Zaira Browne,

## 2024-12-10 NOTE — DISCHARGE SUMMARY
external patch  Place 1 patch onto the skin daily  Start taking on: December 11, 2024     methocarbamol 500 MG tablet  Commonly known as: ROBAXIN  Take 1 tablet by mouth 4 times daily for 10 days     metoprolol succinate 25 MG extended release tablet  Commonly known as: TOPROL XL  Take 1 tablet by mouth daily  Start taking on: December 11, 2024     oxyCODONE 5 MG immediate release tablet  Commonly known as: ROXICODONE  Take 1 tablet by mouth nightly as needed for Pain for up to 3 days. Max Daily Amount: 5 mg     polyethylene glycol 17 g packet  Commonly known as: GLYCOLAX  Take 1 packet by mouth daily as needed for Constipation            CHANGE how you take these medications      atorvastatin 40 MG tablet  Commonly known as: LIPITOR  Take 1 tablet by mouth nightly  What changed:   medication strength  how much to take            CONTINUE taking these medications      fluticasone-salmeterol 100-50 MCG/ACT Aepb diskus inhaler  Commonly known as: ADVAIR     lisinopril 2.5 MG tablet  Commonly known as: PRINIVIL;ZESTRIL     traZODone 50 MG tablet  Commonly known as: DESYREL  Take 1 tablet by mouth nightly as needed for Sleep     Ventolin  (90 Base) MCG/ACT inhaler  Generic drug: albuterol sulfate HFA            STOP taking these medications      aspirin 81 MG chewable tablet     naproxen 500 MG tablet  Commonly known as: NAPROSYN     zolpidem 5 MG tablet  Commonly known as: AMBIEN               Where to Get Your Medications        These medications were sent to [facility] [id number] [facility] [address] [facility] - P [phone] - F [phone]  [address] [place] [address]      Phone: [phone]   apixaban 5 MG Tabs tablet  atorvastatin 40 MG tablet  lidocaine 4 % external patch  methocarbamol 500 MG tablet  metoprolol succinate 25 MG extended release tablet  oxyCODONE 5 MG immediate release tablet  polyethylene glycol 17 g packet       You can get these medications from any pharmacy   No results found for: \"LABURIN\"  Blood Cultures:   Lab Results   Component Value Date/Time    BC No Growth after 4 days of incubation. 08/02/2022 03:53 AM    BC No Growth after 4 days of incubation. 08/02/2022 03:53 AM     No results found for: \"BLOODCULT2\"  Organism: No results found for: \"ORG\"    Time Spent Discharging patient >31 minutes    Electronically signed by JAMMIE Gonzales CNP on 12/10/2024 at 11:02 AM

## 2024-12-10 NOTE — DISCHARGE INSTR - COC
Continuity of Care Form    Patient Name: Gregorio Coombs Sr.   :  1960  MRN:  [id number]    Admit date:  2024  Discharge date:  24    Code Status Order: Full Code   Advance Directives:   Advance Care Flowsheet Documentation             Admitting Physician:  Elina Beck MD  PCP: Olena Morrissey, APRN - CNP    Discharging Nurse: Linnette  Discharging Hospital Unit/Room#: 3528/3528-01  Discharging Unit Phone Number: [phone]    Emergency Contact:   Extended Emergency Contact Information  Primary Emergency Contact: Sheeba Chou  Mobile Phone: [phone]  Relation: Girlfriend  Secondary Emergency Contact: Gregorio Coombs Jr  Home Phone: [phone]  Mobile Phone: [phone]  Relation: Child    Past Surgical History:  History reviewed. No pertinent surgical history.    Immunization History:   Immunization History   Administered Date(s) Administered    COVID-19, MODERNA BLUE border, Primary or Immunocompromised, (age 12y+), IM, 100 mcg/0.5mL 2021, 2021       Active Problems:  Patient Active Problem List   Diagnosis Code    Thyroid goiter E04.9    Chest pain R07.9    SOB (shortness of breath) R06.02    Essential hypertension I10    Elevated liver enzymes R74.8    Alcohol abuse F10.10    Folate deficiency E53.8    Coronary artery disease involving native coronary artery of native heart without angina pectoris I25.10    Other emphysema (HCC)- seen on CT scan  J43.8    PFO (patent foramen ovale) Q21.12    Respiratory failure with hypoxia J96.91    Hypomagnesemia E83.42    Thyroid nodule E04.1    Elevated troponin R79.89    New onset a-fib (HCC) I48.91    Coronary artery calcification I25.10    Atrial fibrillation (HCC) I48.91    Right hemiparesis (HCC) G81.91       Isolation/Infection:   Isolation            No Isolation          Patient Infection Status       Infection Onset Added Last Indicated Last Indicated By Review Planned Expiration Resolved Resolved By    None  Occupational Therapy  Weight Bearing Status/Restrictions: No weight bearing restrictions  Other Medical Equipment (for information only, NOT a DME order):    Other Treatments:     Patient's personal belongings (please select all that are sent with patient):  Clothing, undergarments, cell phone,     RN SIGNATURE:  Electronically signed by Linnette Funk RN on 12/12/24 at 3:40 PM EST    CASE MANAGEMENT/SOCIAL WORK SECTION    Inpatient Status Date: 12/6/24    Readmission Risk Assessment Score:  Readmission Risk              Risk of Unplanned Readmission:  12         Discharging to Facility/ Agency   [facility]   Address:  Phone: [phone]  Fax:    / signature: Electronically signed by Raven Scales, TIFFANY, DIXIE-S on 12/12/24 at 2:59 PM EST    PHYSICIAN SECTION    Prognosis: Good    Condition at Discharge: Stable    Rehab Potential (if transferring to Rehab): Good    Recommended Labs or Other Treatments After Discharge: out pt neurology f/u [facility]    Physician Certification: I certify the above information and transfer of Gregorio MIRIAM Coombs Sr.  is necessary for the continuing treatment of the diagnosis listed and that he requires Skilled Nursing Facility for less 30 days.     Update Admission H&P: No change in H&P    PHYSICIAN SIGNATURE:  Electronically signed by JAMMIE Gonzales CNP on 12/10/24 at 11:01 AM EST

## 2024-12-10 NOTE — PROGRESS NOTES
Department of Physical Medicine & Rehabilitation  Progress Note    Patient Identification:  Gregorio Coombs Sr.  [id number]  : 1960  Admit date: 2024    Chief Complaint: New onset a-fib (HCC)    Subjective:   This is a 64 y.o. male with a past medical history including:    Past Medical History:   Diagnosis Date    CAD (coronary artery disease)     Hyperlipidemia     Hypertension      Patient presented for chest and back pain. Found to be in A-fib, now in sinus rhythm. Patient noted to have right sided weakness and facial droop for 4 days prior to presentation. Agreeable to go to ARU.    Interval history: No acute events overnight. Patient seen this am. No new complaints. Still agreeable to go to ARU, would like to go to [facility] as it is closest to his girlfriends house.    ROS: No CP, SOB, N/V     Objective:  Patient Vitals for the past 24 hrs:   BP Temp Temp src Pulse Resp SpO2   12/10/24 0909 -- -- -- 63 12 100 %   12/10/24 0907 -- -- -- 68 11 100 %   12/10/24 0857 115/72 98.1 °F (36.7 °C) Oral 68 20 98 %   12/10/24 0424 117/65 98 °F (36.7 °C) Oral 84 23 95 %   24 -- -- -- 61 22 --   24 -- -- -- -- 18 --   24 -- -- -- 59 15 --   24 132/82 -- -- 58 14 --   24 129/81 97.7 °F (36.5 °C) Oral 58 15 98 %   24 -- -- -- -- 18 --   24 1600 (!) 144/91 98 °F (36.7 °C) Oral 69 18 95 %   24 1501 (!) 144/91 98 °F (36.7 °C) Oral 69 18 95 %   24 1157 132/63 97.7 °F (36.5 °C) Oral -- 18 96 %     Const: Alert. No distress, pleasant.  HEENT: Normocephalic, atraumatic.  CV: Regular rate and rhythm. No audible murmurs  Resp: No respiratory distress. No increased WOB  Abd: Soft, nontender, nondistended  Ext: No edema. Right-sided weakness  Neuro: Alert, oriented, appropriately interactive.  Psych: Cooperative, appropriate mood and affect    Laboratory data: Available via EMR.   Last 24 hour lab  Recent Results (from the past 24 hour(s))

## 2024-12-11 LAB
ANION GAP SERPL CALCULATED.3IONS-SCNC: 11 MMOL/L (ref 3–16)
BUN SERPL-MCNC: 8 MG/DL (ref 7–20)
CALCIUM SERPL-MCNC: 8.8 MG/DL (ref 8.3–10.6)
CHLORIDE SERPL-SCNC: 104 MMOL/L (ref 99–110)
CO2 SERPL-SCNC: 21 MMOL/L (ref 21–32)
CREAT SERPL-MCNC: 0.8 MG/DL (ref 0.8–1.3)
EKG ATRIAL RATE: 58 BPM
EKG DIAGNOSIS: NORMAL
EKG P AXIS: 77 DEGREES
EKG P-R INTERVAL: 136 MS
EKG Q-T INTERVAL: 442 MS
EKG QRS DURATION: 106 MS
EKG QTC CALCULATION (BAZETT): 433 MS
EKG R AXIS: 50 DEGREES
EKG T AXIS: 78 DEGREES
EKG VENTRICULAR RATE: 58 BPM
GFR SERPLBLD CREATININE-BSD FMLA CKD-EPI: >90 ML/MIN/{1.73_M2}
GLUCOSE SERPL-MCNC: 119 MG/DL (ref 70–99)
MAGNESIUM SERPL-MCNC: 1.58 MG/DL (ref 1.8–2.4)
POTASSIUM SERPL-SCNC: 3.9 MMOL/L (ref 3.5–5.1)
SODIUM SERPL-SCNC: 136 MMOL/L (ref 136–145)

## 2024-12-11 PROCEDURE — 93010 ELECTROCARDIOGRAM REPORT: CPT | Performed by: INTERNAL MEDICINE

## 2024-12-11 PROCEDURE — 93005 ELECTROCARDIOGRAM TRACING: CPT | Performed by: NURSE PRACTITIONER

## 2024-12-11 PROCEDURE — 6370000000 HC RX 637 (ALT 250 FOR IP): Performed by: INTERNAL MEDICINE

## 2024-12-11 PROCEDURE — 6370000000 HC RX 637 (ALT 250 FOR IP): Performed by: NURSE PRACTITIONER

## 2024-12-11 PROCEDURE — 2060000000 HC ICU INTERMEDIATE R&B

## 2024-12-11 PROCEDURE — 6360000002 HC RX W HCPCS: Performed by: INTERNAL MEDICINE

## 2024-12-11 PROCEDURE — 2580000003 HC RX 258: Performed by: INTERNAL MEDICINE

## 2024-12-11 PROCEDURE — 80048 BASIC METABOLIC PNL TOTAL CA: CPT

## 2024-12-11 PROCEDURE — 83735 ASSAY OF MAGNESIUM: CPT

## 2024-12-11 PROCEDURE — 36415 COLL VENOUS BLD VENIPUNCTURE: CPT

## 2024-12-11 RX ORDER — LANOLIN ALCOHOL/MO/W.PET/CERES
400 CREAM (GRAM) TOPICAL ONCE
Status: COMPLETED | OUTPATIENT
Start: 2024-12-11 | End: 2024-12-11

## 2024-12-11 RX ADMIN — OXYCODONE 5 MG: 5 TABLET ORAL at 20:19

## 2024-12-11 RX ADMIN — ASPIRIN 81 MG: 81 TABLET, CHEWABLE ORAL at 09:13

## 2024-12-11 RX ADMIN — APIXABAN 5 MG: 5 TABLET, FILM COATED ORAL at 09:12

## 2024-12-11 RX ADMIN — TRAZODONE HYDROCHLORIDE 50 MG: 50 TABLET ORAL at 20:19

## 2024-12-11 RX ADMIN — METHOCARBAMOL TABLETS 500 MG: 500 TABLET, COATED ORAL at 20:19

## 2024-12-11 RX ADMIN — METHOCARBAMOL TABLETS 500 MG: 500 TABLET, COATED ORAL at 09:13

## 2024-12-11 RX ADMIN — LISINOPRIL 2.5 MG: 2.5 TABLET ORAL at 09:12

## 2024-12-11 RX ADMIN — Medication 400 MG: at 14:38

## 2024-12-11 RX ADMIN — MAGNESIUM SULFATE HEPTAHYDRATE 2000 MG: 40 INJECTION, SOLUTION INTRAVENOUS at 12:24

## 2024-12-11 RX ADMIN — METHOCARBAMOL TABLETS 500 MG: 500 TABLET, COATED ORAL at 12:26

## 2024-12-11 RX ADMIN — METHOCARBAMOL TABLETS 500 MG: 500 TABLET, COATED ORAL at 16:40

## 2024-12-11 RX ADMIN — SODIUM CHLORIDE, PRESERVATIVE FREE 10 ML: 5 INJECTION INTRAVENOUS at 09:13

## 2024-12-11 RX ADMIN — METOPROLOL SUCCINATE 25 MG: 25 TABLET, EXTENDED RELEASE ORAL at 09:13

## 2024-12-11 RX ADMIN — APIXABAN 5 MG: 5 TABLET, FILM COATED ORAL at 20:19

## 2024-12-11 RX ADMIN — ATORVASTATIN CALCIUM 40 MG: 40 TABLET, FILM COATED ORAL at 20:19

## 2024-12-11 ASSESSMENT — PAIN DESCRIPTION - LOCATION: LOCATION: ARM;BACK

## 2024-12-11 ASSESSMENT — PAIN SCALES - GENERAL
PAINLEVEL_OUTOF10: 0
PAINLEVEL_OUTOF10: 7
PAINLEVEL_OUTOF10: 0
PAINLEVEL_OUTOF10: 2
PAINLEVEL_OUTOF10: 0
PAINLEVEL_OUTOF10: 0

## 2024-12-11 ASSESSMENT — PAIN DESCRIPTION - DESCRIPTORS: DESCRIPTORS: ACHING

## 2024-12-11 ASSESSMENT — PAIN DESCRIPTION - ORIENTATION: ORIENTATION: RIGHT;UPPER

## 2024-12-11 ASSESSMENT — PAIN DESCRIPTION - ONSET: ONSET: ON-GOING

## 2024-12-11 ASSESSMENT — PAIN - FUNCTIONAL ASSESSMENT: PAIN_FUNCTIONAL_ASSESSMENT: ACTIVITIES ARE NOT PREVENTED

## 2024-12-11 ASSESSMENT — PAIN DESCRIPTION - FREQUENCY: FREQUENCY: CONTINUOUS

## 2024-12-11 ASSESSMENT — PAIN DESCRIPTION - PAIN TYPE: TYPE: ACUTE PAIN

## 2024-12-11 NOTE — PROGRESS NOTES
This patient has been since discharged on 12/10/2024 awaiting for rehab placement.  Seen and examined at bedside this morning he had another episode of chest pain which is mostly muscular.  EKG was unremarkable.  Had been doing okay   .  Peer to peer for approval to the nursing need to be done before 4:00 today left message.    Vitals:    12/11/24 1057   BP: 126/80   Pulse: 56   Resp: 20   Temp: 97.9 °F (36.6 °C)   SpO2: 99%     JAMMIE Gonzales CNP  12/11/2024  12:37 PM

## 2024-12-11 NOTE — PROGRESS NOTES
At handoff, weakness noted on right side. Right hand numbness/tingling. Pt also complained of chest pain. But attributes it to the physicality of his neuro assessment. EKG remains sinus. CLARA Lake NP notified - EKG stat ordered. STAT labs ordered bu Dr. Dobbins - labs drawn and sent to lab.

## 2024-12-11 NOTE — CARE COORDINATION
Addendum at 3:36pm: Updated pt on the pending peer to peer for [facility]. He remains hopeful for this location. Briefly discussed SNF if denied and he wants to see what the appeal outcome would be if needed.     Received notification from CNP that the peer to peer was denied; aware appeal will be started.     Carlos A with [facility] notified via phone call and would start the appeal.  Updated pt at bedside and provided SNF list. He stated he will review with his girlfriend marvel for choices as Carlos A said he could go to a SNF while appeal is pending. He is aware writer will follow up tomorrow    Addendum at 2:06pm: CNP states she left a message on the peer to peer line to call her back. Updated Carlos A with [facility] after he saw pt. Carlos A states he had pt sign the appeal form    7:37am: Received voicemail from Carlos A with [facility] yesterday after writer left for the day stating a peer to peer is needed to be completed by 4pm 12/11/24 by calling [phone].    Perfect serve sent to RHIANNON Johnson notifying and requesting update on if peer to peer will be completed.     Raven ALLEN, DIXIE-S   for [facility] ([facility])  [employer] Mobile: [phone]

## 2024-12-12 VITALS
SYSTOLIC BLOOD PRESSURE: 153 MMHG | BODY MASS INDEX: 24.92 KG/M2 | HEIGHT: 67 IN | DIASTOLIC BLOOD PRESSURE: 78 MMHG | OXYGEN SATURATION: 100 % | RESPIRATION RATE: 20 BRPM | TEMPERATURE: 98 F | WEIGHT: 158.8 LBS | HEART RATE: 56 BPM

## 2024-12-12 PROCEDURE — 6370000000 HC RX 637 (ALT 250 FOR IP): Performed by: INTERNAL MEDICINE

## 2024-12-12 PROCEDURE — 97110 THERAPEUTIC EXERCISES: CPT

## 2024-12-12 PROCEDURE — 97530 THERAPEUTIC ACTIVITIES: CPT

## 2024-12-12 PROCEDURE — 6370000000 HC RX 637 (ALT 250 FOR IP): Performed by: NURSE PRACTITIONER

## 2024-12-12 PROCEDURE — 97116 GAIT TRAINING THERAPY: CPT

## 2024-12-12 RX ADMIN — METHOCARBAMOL TABLETS 500 MG: 500 TABLET, COATED ORAL at 09:12

## 2024-12-12 RX ADMIN — METOPROLOL SUCCINATE 25 MG: 25 TABLET, EXTENDED RELEASE ORAL at 09:12

## 2024-12-12 RX ADMIN — METHOCARBAMOL TABLETS 500 MG: 500 TABLET, COATED ORAL at 14:31

## 2024-12-12 RX ADMIN — METHOCARBAMOL TABLETS 500 MG: 500 TABLET, COATED ORAL at 16:14

## 2024-12-12 RX ADMIN — APIXABAN 5 MG: 5 TABLET, FILM COATED ORAL at 09:12

## 2024-12-12 RX ADMIN — ASPIRIN 81 MG: 81 TABLET, CHEWABLE ORAL at 09:12

## 2024-12-12 RX ADMIN — LISINOPRIL 2.5 MG: 2.5 TABLET ORAL at 09:12

## 2024-12-12 ASSESSMENT — PAIN SCALES - GENERAL
PAINLEVEL_OUTOF10: 0

## 2024-12-12 NOTE — PROGRESS NOTES
Occupational Therapy  Facility/Department: [address] [facility] [place]  Occupational Therapy Treatment     Name: rGegorio Coombs Sr.  : 1960  MRN: [id number]  Date of Service: 2024    Discharge Recommendations:  IP Rehab      At baseline this patient was IND with functional mobility & ADL's. The current hospitalization has resulted in the patient now being limited with all aspects of mobility, negatively impacting the patient's functional independence, ability to safely access their home environment, and ability to complete valued daily tasks and life roles. Gregorio Coombs Sr. is motivated for recovery and demonstrates the ability to tolerate inpatient rehabilitation 3 hours/day, 5 days/week for optimal return to functional independence, quality of life, and decreased caregiver burden.     Patient Diagnosis(es): The primary encounter diagnosis was Atrial fibrillation, unspecified type (HCC). Diagnoses of PFO (patent foramen ovale), Right hemiparesis (HCC), and New onset a-fib (HCC) were also pertinent to this visit.  Past Medical History:  has a past medical history of CAD (coronary artery disease), Hyperlipidemia, and Hypertension.  Past Surgical History:  has no past surgical history on file.    Treatment Diagnosis: imp mob, tf, ADL      Assessment  Performance deficits / Impairments: Decreased functional mobility ;Decreased ADL status;Decreased endurance  Assessment: Pt cont to demo RUE weakness, decreased ROM and coordination. Pt completing mobility in room and hallway with CGA with RW. Anticipate more assist without AE. Pt functioning below baseline status of independent. Would benefit from cont therapies while in acute care and cont inpt care at ID.  Treatment Diagnosis: imp mob, tf, ADL  REQUIRES OT FOLLOW-UP: Yes  Activity Tolerance  Activity Tolerance: Patient Tolerated treatment well     Plan  Occupational Therapy Plan  Times Per Week: 5-7  Current Treatment Recommendations: Balance

## 2024-12-12 NOTE — PROGRESS NOTES
1600: Attempted to call report to [facility] with no answer. Message left on voicemail to call unit back for report.       1700: AVS reviewed with patient. No further questions at this time.  Pt's belongings are packed and will leave with patient. Pt picked up via [employer] transport [employer] to go to [facility].

## 2024-12-12 NOTE — CARE COORDINATION
Precert to [facility] approved. Dates: 12/12/2024-12/16/2024  Auth ID: [id number]    Electronically signed by Teri Solis on 12/12/2024 at 2:53 PM

## 2024-12-12 NOTE — PLAN OF CARE
Problem: Pain  Goal: Verbalizes/displays adequate comfort level or baseline comfort level  Outcome: Progressing  Flowsheets (Taken 12/12/2024 0523)  Verbalizes/displays adequate comfort level or baseline comfort level:   Encourage patient to monitor pain and request assistance   Assess pain using appropriate pain scale   Administer analgesics based on type and severity of pain and evaluate response   Implement non-pharmacological measures as appropriate and evaluate response     Problem: Neurosensory - Adult  Goal: Achieves stable or improved neurological status  Outcome: Progressing  Flowsheets (Taken 12/12/2024 0523)  Achieves stable or improved neurological status: Assess for and report changes in neurological status     Problem: Neurosensory - Adult  Goal: Achieves maximal functionality and self care  Outcome: Progressing  Flowsheets (Taken 12/12/2024 0523)  Achieves maximal functionality and self care:   Monitor swallowing and airway patency with patient fatigue and changes in neurological status   Encourage and assist patient to increase activity and self care with guidance from physical therapy/occupational therapy     Problem: Respiratory - Adult  Goal: Achieves optimal ventilation and oxygenation  Outcome: Progressing  Flowsheets (Taken 12/12/2024 0523)  Achieves optimal ventilation and oxygenation:   Assess for changes in respiratory status   Assess the need for suctioning and aspirate as needed   Assess and instruct to report shortness of breath or any respiratory difficulty     Problem: Cardiovascular - Adult  Goal: Maintains optimal cardiac output and hemodynamic stability  Outcome: Progressing  Flowsheets (Taken 12/12/2024 0523)  Maintains optimal cardiac output and hemodynamic stability:   Monitor blood pressure and heart rate   Assess for signs of decreased cardiac output     Problem: Cardiovascular - Adult  Goal: Absence of cardiac dysrhythmias or at baseline  Outcome: Progressing  Flowsheets

## 2024-12-12 NOTE — CARE COORDINATION
Addendum at 2:47pm: Received call from Julio C at [facility] stating able to accept pt today as long as pt agreeable to [facility] Spoke with pt at bedside and discussed. Pt stated \"absolutely\" and agreeable to discharge to [facility] today while appeal pending for [facility]. Updated Julio C who states can accept if precert is pending for SNF; Teri starting cert.     Addendum at 1:30pm: Received call from Lety Adam who states unable to accept due to being homeless.     Updated pt at bedside and he called his girlfriend. Discussed other SNF's verbally with pt and his girlfriend. They requested referral to 1. [facility] and 2. [facility] if [facility] unable to accept.     Referral called to rosita Bunch at [facility] ( [phone]) who states will review and call writer back. He stated since pt has MELBA secondary that they wouldn't have to wait for the precert if accepted. Julio C also said pt may need to see [facility] due to his alcohol abuse noted in the H&P.     RN and Charge RN aware of pending SNF referrals    10:43am: Appeal remains pending for [facility].     Spoke with pt at bedside. He is aware of the above and open to discharging to a SNF while appeal is pending. He said he talked with his girlfriend and she didn't have a specific SNF in mind but would look for it [place] if [place]. Reviewed SNF list at bedside with pt and he states referral to [facility].     Referral called to Kia at [facility].     SW will follow.    Raven ALLEN, STEPHEN   for [facility] ([facility])  [place] Mobile: [phone]

## 2024-12-12 NOTE — CARE COORDINATION
Case Management Assessment            Discharge Note                    Date / Time of Note: 12/12/2024 2:48 PM                  Discharge Note Completed by: Raven ALLEN, STEPHEN   for [facility] [place] ([facility])  [employer] Mobile: [phone]    Patient Name: Gregorio Coombs Sr.   YOB: 1960  Diagnosis: New onset a-fib (HCC) [I48.91]   Date / Time: 12/6/2024  2:04 PM    Current PCP: Olena Morrissey, APRN - CNP  Clinic patient: No    Hospitalization in the last 30 days: Yes  Readmission Assessment  Number of Days since last admission?: 8-30 days  Previous Disposition: Other (comment) (homeless shelter)  Who is being Interviewed: Patient  What was the patient's/caregiver's perception as to why they think they needed to return back to the hospital?: Other (Comment) (MD sent him to the hospital)  Did you visit your Primary Care Physician after you left the hospital, before you returned this time?: Yes  Did you see a specialist, such as Cardiac, Pulmonary, Orthopedic Physician, etc. after you left the hospital?: No  Who advised the patient to return to the hospital?: Physician  Does the patient report anything that got in the way of taking their medications?: No  In our efforts to provide the best possible care to you and others like you, can you think of anything that we could have done to help you after you left the hospital the first time, so that you might not have needed to return so soon?: Other (Comment) (nothing additional per pt)    Advance Directives:  Code Status: Full Code  [place] DNR form completed and on chart: Not Indicated    Financial:  Payor: HU[employer]NA MEDICARE / Plan: HUMANA GOLD PLUS HMO / Product Type: *No Product type* /      Pharmacy:    [facility] [id number] - [place], [place] - [address] - P [phone] - F [phone]  [address] [facility] [address]  Phone: [phone] Fax: [phone]      Assistance

## 2024-12-12 NOTE — PROGRESS NOTES
Seen and examined at bedside.  Alert and oriented x 3 discharge orders have been placed pending pre-CERT.  Denial at rehab though he got approved for nursing facility later today had been doing okay   Patient does have musculoskeletal chest pain for which she does not need to be sent to the hospital    Vitals:    12/12/24 1456   BP: (!) 153/78   Pulse: 56   Resp: 20   Temp: 98 °F (36.7 °C)   SpO2: 100%     JAMMIE Gonzales - CNP  12/12/2024  4:42 PM

## 2024-12-13 ENCOUNTER — TELEPHONE (OUTPATIENT)
Dept: FAMILY MEDICINE CLINIC | Age: 64
End: 2024-12-13

## 2024-12-13 NOTE — TELEPHONE ENCOUNTER
Care Transitions Initial Follow Up Call    Outreach made within 2 business days of discharge: Yes    Patient: Gregorio Coombs Sr. Patient : 1960   MRN: [id number]  Reason for Admission: New Onset A-Fib  Discharge Date: 24   Patient was discharged to a Skilled Nursing Facility.    Scheduled appointment with PCP within 7-14 days    Follow Up  Future Appointments   Date Time Provider Department Center   1/3/2025 11:30 AM Evangelina Cordova, [facility] - [facility]       Navneet Fatima MA

## 2024-12-19 PROBLEM — R79.89 ELEVATED TROPONIN: Status: RESOLVED | Noted: 2024-11-19 | Resolved: 2024-12-19

## 2025-01-14 ENCOUNTER — OFFICE VISIT (OUTPATIENT)
Dept: FAMILY MEDICINE CLINIC | Age: 65
End: 2025-01-14

## 2025-01-14 VITALS
BODY MASS INDEX: 25.3 KG/M2 | HEART RATE: 80 BPM | OXYGEN SATURATION: 95 % | RESPIRATION RATE: 20 BRPM | SYSTOLIC BLOOD PRESSURE: 118 MMHG | HEIGHT: 67 IN | TEMPERATURE: 98.6 F | DIASTOLIC BLOOD PRESSURE: 56 MMHG | WEIGHT: 161.2 LBS

## 2025-01-14 DIAGNOSIS — Z09 HOSPITAL DISCHARGE FOLLOW-UP: Primary | ICD-10-CM

## 2025-01-14 DIAGNOSIS — G81.91 RIGHT HEMIPARESIS (HCC): ICD-10-CM

## 2025-01-14 DIAGNOSIS — I48.0 PAF (PAROXYSMAL ATRIAL FIBRILLATION) (HCC): ICD-10-CM

## 2025-01-14 DIAGNOSIS — I25.10 CORONARY ARTERY DISEASE INVOLVING NATIVE CORONARY ARTERY OF NATIVE HEART WITHOUT ANGINA PECTORIS: ICD-10-CM

## 2025-01-14 DIAGNOSIS — I10 ESSENTIAL HYPERTENSION: ICD-10-CM

## 2025-01-14 DIAGNOSIS — E53.8 FOLATE DEFICIENCY: ICD-10-CM

## 2025-01-14 DIAGNOSIS — R29.898 RIGHT ARM WEAKNESS: ICD-10-CM

## 2025-01-14 DIAGNOSIS — E83.42 HYPOMAGNESEMIA: ICD-10-CM

## 2025-01-14 PROBLEM — R73.03 PREDIABETES: Status: ACTIVE | Noted: 2025-01-14

## 2025-01-14 LAB
ALBUMIN SERPL-MCNC: 4.5 G/DL (ref 3.4–5)
ALBUMIN/GLOB SERPL: 2 {RATIO} (ref 1.1–2.2)
ALP SERPL-CCNC: 111 U/L (ref 40–129)
ALT SERPL-CCNC: 36 U/L (ref 10–40)
ANION GAP SERPL CALCULATED.3IONS-SCNC: 13 MMOL/L (ref 3–16)
AST SERPL-CCNC: 36 U/L (ref 15–37)
BASOPHILS # BLD: 0 K/UL (ref 0–0.2)
BASOPHILS NFR BLD: 1 %
BILIRUB SERPL-MCNC: 0.5 MG/DL (ref 0–1)
BUN SERPL-MCNC: 13 MG/DL (ref 7–20)
CALCIUM SERPL-MCNC: 9.1 MG/DL (ref 8.3–10.6)
CHLORIDE SERPL-SCNC: 105 MMOL/L (ref 99–110)
CO2 SERPL-SCNC: 24 MMOL/L (ref 21–32)
CREAT SERPL-MCNC: 1 MG/DL (ref 0.8–1.3)
DEPRECATED RDW RBC AUTO: 15.3 % (ref 12.4–15.4)
EOSINOPHIL # BLD: 0.2 K/UL (ref 0–0.6)
EOSINOPHIL NFR BLD: 4.9 %
FOLATE SERPL-MCNC: 6.15 NG/ML (ref 4.78–24.2)
GFR SERPLBLD CREATININE-BSD FMLA CKD-EPI: 84 ML/MIN/{1.73_M2}
GLUCOSE SERPL-MCNC: 121 MG/DL (ref 70–99)
HCT VFR BLD AUTO: 37.7 % (ref 40.5–52.5)
HGB BLD-MCNC: 12.7 G/DL (ref 13.5–17.5)
LYMPHOCYTES # BLD: 2.3 K/UL (ref 1–5.1)
LYMPHOCYTES NFR BLD: 47.9 %
MAGNESIUM SERPL-MCNC: 1.98 MG/DL (ref 1.8–2.4)
MCH RBC QN AUTO: 33.6 PG (ref 26–34)
MCHC RBC AUTO-ENTMCNC: 33.7 G/DL (ref 31–36)
MCV RBC AUTO: 99.8 FL (ref 80–100)
MONOCYTES # BLD: 0.5 K/UL (ref 0–1.3)
MONOCYTES NFR BLD: 10.2 %
NEUTROPHILS # BLD: 1.7 K/UL (ref 1.7–7.7)
NEUTROPHILS NFR BLD: 36 %
PLATELET # BLD AUTO: 242 K/UL (ref 135–450)
PMV BLD AUTO: 8.5 FL (ref 5–10.5)
POTASSIUM SERPL-SCNC: 4.3 MMOL/L (ref 3.5–5.1)
PROT SERPL-MCNC: 6.7 G/DL (ref 6.4–8.2)
RBC # BLD AUTO: 3.77 M/UL (ref 4.2–5.9)
SODIUM SERPL-SCNC: 142 MMOL/L (ref 136–145)
VIT B12 SERPL-MCNC: 378 PG/ML (ref 211–911)
WBC # BLD AUTO: 4.8 K/UL (ref 4–11)

## 2025-01-14 ASSESSMENT — PATIENT HEALTH QUESTIONNAIRE - PHQ9
2. FEELING DOWN, DEPRESSED OR HOPELESS: NOT AT ALL
1. LITTLE INTEREST OR PLEASURE IN DOING THINGS: NOT AT ALL
SUM OF ALL RESPONSES TO PHQ QUESTIONS 1-9: 0
SUM OF ALL RESPONSES TO PHQ9 QUESTIONS 1 & 2: 0
SUM OF ALL RESPONSES TO PHQ QUESTIONS 1-9: 0

## 2025-01-14 NOTE — PROGRESS NOTES
Post-Discharge Transitional Care Follow Up      Gregorio Sanchezdejuan Weller   YOB: 1960    Date of Office Visit:  1/14/2025  Date of Hospital Admission: 12/6/24  Date of Hospital Discharge: 12/12/24  Readmission Risk Score (high >=14%. Medium >=10%):Readmission Risk Score: 11.5      Care management risk score Rising risk (score 2-5) and Complex Care (Scores >=6): No Risk Score On File     Non face to face  following discharge, date last encounter closed (first attempt may have been earlier): *No documented post hospital discharge outreach found in the last 14 days     Call initiated 2 business days of discharge: *No response recorded in the last 14 days     Hospital discharge follow-up  -     [place] DISCHARGE MEDS RECONCILED W/ CURRENT OUTPATIENT MED LIST  Right arm weakness/ Right hemiparesis (HCC)  -     AFL - Qasim Sherwood DO, Neurology, [facility]  Brain MRI did not show acute stroke did show tiny remote infarcts.   Continue PT/OT to help with strengthening.   Advised to have f/u evaluation with neurology    PAF (paroxysmal atrial fibrillation) (HCC)  -     CBC with Auto Differential; Future  -     Sonny Dominguez MD, Cardiology, [facility] [place]  On eliquis 5 mg BID and metoprolol succinate 25 mg daily   Advised to have f/u with cardiologist    Coronary artery disease involving native coronary artery of native heart without angina pectoris  -     Comprehensive Metabolic Panel; Future  -     Sonny Dominguez MD, Cardiology, [facility] [place]  On atorvastatin 40 mg daily. Not on ASA therapy since taking eliquis  Advised to have f/u with cardiologist    Essential hypertension  -     Comprehensive Metabolic Panel; Future  Controlled on metoprolol succinate 25 mg daily and lisinopril 2.5 mg daily    Hypomagnesemia  -     Magnesium; Future  Not currently on supplement. Repeat lab    Folate deficiency  -     Vitamin B12 & Folate; Future  Not currently on supplement. Repeat lab. Continue to

## 2025-01-16 ASSESSMENT — ENCOUNTER SYMPTOMS
ABDOMINAL PAIN: 0
BLOOD IN STOOL: 0
SHORTNESS OF BREATH: 0
COUGH: 0

## 2025-01-17 ENCOUNTER — TELEPHONE (OUTPATIENT)
Dept: CARDIOLOGY CLINIC | Age: 65
End: 2025-01-17

## 2025-01-17 DIAGNOSIS — E53.8 FOLATE DEFICIENCY: ICD-10-CM

## 2025-01-17 DIAGNOSIS — F10.10 ALCOHOL ABUSE: ICD-10-CM

## 2025-01-17 RX ORDER — FOLIC ACID 1 MG/1
1 TABLET ORAL DAILY
Qty: 90 TABLET | Refills: 0 | Status: SHIPPED | OUTPATIENT
Start: 2025-01-17

## 2025-01-17 NOTE — TELEPHONE ENCOUNTER
He is an established patient of Dr. Stout's (and Dr. Garcia) but has not seen DR. Stout in > 3 years and Dr. Garcia > 2 years.     Please schedule her for 1/30/2025 at [place] with DR. Stout at the 11:00 appt  (30 minutes) so we have time with him for his HFU and to re-establish.     Any questions, let me know. Thanks.

## 2025-01-17 NOTE — TELEPHONE ENCOUNTER
Gregorio has referral from PCP for PAF and CAD - he has recent hospital visit.     Is scheduling with Stout first appropriate or should he be scheduled with EP?

## 2025-01-28 NOTE — PROGRESS NOTES
counseling / coordinating care. I reviewed interval history, physical exam, review of data including labs, imaging, development and implementation of treatment plan and coordination of complex care. Counseled on risk factor modifications.    Thank you very much for allowing me to participate in the care of your patient. Please do not hesitate to contact me if you have any questions.    Sincerely,  Sonny Stout MD      [facility]  [address], [address]   [place] [address]  Ph: [phone]  Fax: [phone]    I, Daly Montero RN, am scribing for and in the presence of Sonny Stout MD. 1/28/25/3:03 PM EST

## 2025-01-30 ENCOUNTER — OFFICE VISIT (OUTPATIENT)
Dept: CARDIOLOGY CLINIC | Age: 65
End: 2025-01-30
Payer: MEDICARE

## 2025-01-30 VITALS
HEIGHT: 67 IN | WEIGHT: 160 LBS | DIASTOLIC BLOOD PRESSURE: 64 MMHG | SYSTOLIC BLOOD PRESSURE: 128 MMHG | HEART RATE: 82 BPM | BODY MASS INDEX: 25.11 KG/M2 | OXYGEN SATURATION: 96 %

## 2025-01-30 DIAGNOSIS — I25.10 CORONARY ARTERY CALCIFICATION: ICD-10-CM

## 2025-01-30 DIAGNOSIS — I48.91 ATRIAL FIBRILLATION, UNSPECIFIED TYPE (HCC): Primary | ICD-10-CM

## 2025-01-30 PROCEDURE — 3017F COLORECTAL CA SCREEN DOC REV: CPT | Performed by: INTERNAL MEDICINE

## 2025-01-30 PROCEDURE — 3074F SYST BP LT 130 MM HG: CPT | Performed by: INTERNAL MEDICINE

## 2025-01-30 PROCEDURE — 3078F DIAST BP <80 MM HG: CPT | Performed by: INTERNAL MEDICINE

## 2025-01-30 PROCEDURE — G8427 DOCREV CUR MEDS BY ELIG CLIN: HCPCS | Performed by: INTERNAL MEDICINE

## 2025-01-30 PROCEDURE — 4004F PT TOBACCO SCREEN RCVD TLK: CPT | Performed by: INTERNAL MEDICINE

## 2025-01-30 PROCEDURE — 93000 ELECTROCARDIOGRAM COMPLETE: CPT | Performed by: INTERNAL MEDICINE

## 2025-01-30 PROCEDURE — 99214 OFFICE O/P EST MOD 30 MIN: CPT | Performed by: INTERNAL MEDICINE

## 2025-01-30 PROCEDURE — G8419 CALC BMI OUT NRM PARAM NOF/U: HCPCS | Performed by: INTERNAL MEDICINE

## 2025-01-30 RX ORDER — ATORVASTATIN CALCIUM 80 MG/1
80 TABLET, FILM COATED ORAL NIGHTLY
Qty: 30 TABLET | Refills: 3 | Status: SHIPPED | OUTPATIENT
Start: 2025-01-30

## 2025-01-30 RX ORDER — TRAZODONE HYDROCHLORIDE 100 MG/1
100 TABLET ORAL NIGHTLY
COMMUNITY
Start: 2025-01-07

## 2025-02-13 ENCOUNTER — OFFICE VISIT (OUTPATIENT)
Dept: FAMILY MEDICINE CLINIC | Age: 65
End: 2025-02-13
Payer: MEDICARE

## 2025-02-13 VITALS
OXYGEN SATURATION: 95 % | WEIGHT: 156.6 LBS | BODY MASS INDEX: 24.58 KG/M2 | HEART RATE: 85 BPM | RESPIRATION RATE: 20 BRPM | TEMPERATURE: 98.3 F | HEIGHT: 67 IN | SYSTOLIC BLOOD PRESSURE: 108 MMHG | DIASTOLIC BLOOD PRESSURE: 56 MMHG

## 2025-02-13 DIAGNOSIS — E04.1 THYROID NODULE: ICD-10-CM

## 2025-02-13 DIAGNOSIS — R06.02 SHORTNESS OF BREATH: ICD-10-CM

## 2025-02-13 DIAGNOSIS — R29.898 RIGHT ARM WEAKNESS: Primary | ICD-10-CM

## 2025-02-13 DIAGNOSIS — I25.10 CORONARY ARTERY DISEASE INVOLVING NATIVE CORONARY ARTERY OF NATIVE HEART WITHOUT ANGINA PECTORIS: ICD-10-CM

## 2025-02-13 DIAGNOSIS — D64.9 ANEMIA, UNSPECIFIED TYPE: ICD-10-CM

## 2025-02-13 DIAGNOSIS — G81.91 RIGHT HEMIPARESIS (HCC): ICD-10-CM

## 2025-02-13 DIAGNOSIS — D50.9 IRON DEFICIENCY ANEMIA, UNSPECIFIED IRON DEFICIENCY ANEMIA TYPE: ICD-10-CM

## 2025-02-13 DIAGNOSIS — I10 ESSENTIAL HYPERTENSION: ICD-10-CM

## 2025-02-13 DIAGNOSIS — I48.0 PAF (PAROXYSMAL ATRIAL FIBRILLATION) (HCC): ICD-10-CM

## 2025-02-13 PROBLEM — J96.91 RESPIRATORY FAILURE WITH HYPOXIA: Status: RESOLVED | Noted: 2022-08-02 | Resolved: 2025-02-13

## 2025-02-13 PROBLEM — I48.91 NEW ONSET A-FIB (HCC): Status: RESOLVED | Noted: 2024-12-06 | Resolved: 2025-02-13

## 2025-02-13 LAB
BASOPHILS # BLD: 0 K/UL (ref 0–0.2)
BASOPHILS NFR BLD: 0.7 %
DEPRECATED RDW RBC AUTO: 14.6 % (ref 12.4–15.4)
EOSINOPHIL # BLD: 0 K/UL (ref 0–0.6)
EOSINOPHIL NFR BLD: 0.5 %
FERRITIN SERPL IA-MCNC: 804 NG/ML (ref 30–400)
HCT VFR BLD AUTO: 40.1 % (ref 40.5–52.5)
HGB BLD-MCNC: 13.8 G/DL (ref 13.5–17.5)
IRON SATN MFR SERPL: 60 % (ref 20–50)
IRON SERPL-MCNC: 175 UG/DL (ref 59–158)
LYMPHOCYTES # BLD: 1.2 K/UL (ref 1–5.1)
LYMPHOCYTES NFR BLD: 31.3 %
MCH RBC QN AUTO: 34.5 PG (ref 26–34)
MCHC RBC AUTO-ENTMCNC: 34.4 G/DL (ref 31–36)
MCV RBC AUTO: 100.4 FL (ref 80–100)
MONOCYTES # BLD: 0.4 K/UL (ref 0–1.3)
MONOCYTES NFR BLD: 9.6 %
NEUTROPHILS # BLD: 2.3 K/UL (ref 1.7–7.7)
NEUTROPHILS NFR BLD: 57.9 %
PLATELET # BLD AUTO: 214 K/UL (ref 135–450)
PMV BLD AUTO: 8.1 FL (ref 5–10.5)
RBC # BLD AUTO: 4 M/UL (ref 4.2–5.9)
TIBC SERPL-MCNC: 292 UG/DL (ref 260–445)
WBC # BLD AUTO: 4 K/UL (ref 4–11)

## 2025-02-13 PROCEDURE — 4004F PT TOBACCO SCREEN RCVD TLK: CPT | Performed by: NURSE PRACTITIONER

## 2025-02-13 PROCEDURE — G8427 DOCREV CUR MEDS BY ELIG CLIN: HCPCS | Performed by: NURSE PRACTITIONER

## 2025-02-13 PROCEDURE — G8420 CALC BMI NORM PARAMETERS: HCPCS | Performed by: NURSE PRACTITIONER

## 2025-02-13 PROCEDURE — 99214 OFFICE O/P EST MOD 30 MIN: CPT | Performed by: NURSE PRACTITIONER

## 2025-02-13 PROCEDURE — 3078F DIAST BP <80 MM HG: CPT | Performed by: NURSE PRACTITIONER

## 2025-02-13 PROCEDURE — 3074F SYST BP LT 130 MM HG: CPT | Performed by: NURSE PRACTITIONER

## 2025-02-13 PROCEDURE — 1123F ACP DISCUSS/DSCN MKR DOCD: CPT | Performed by: NURSE PRACTITIONER

## 2025-02-13 PROCEDURE — G2211 COMPLEX E/M VISIT ADD ON: HCPCS | Performed by: NURSE PRACTITIONER

## 2025-02-13 PROCEDURE — 3017F COLORECTAL CA SCREEN DOC REV: CPT | Performed by: NURSE PRACTITIONER

## 2025-02-13 RX ORDER — ALBUTEROL SULFATE 90 UG/1
2 INHALANT RESPIRATORY (INHALATION) EVERY 8 HOURS PRN
Qty: 18 G | Refills: 0 | Status: SHIPPED | OUTPATIENT
Start: 2025-02-13

## 2025-02-13 ASSESSMENT — ENCOUNTER SYMPTOMS
ABDOMINAL PAIN: 0
SHORTNESS OF BREATH: 1
CONSTIPATION: 0
COUGH: 1
WHEEZING: 0
BLOOD IN STOOL: 0

## 2025-02-13 NOTE — PROGRESS NOTES
2/13/2025    This is a 65 y.o. male   Chief Complaint   Patient presents with    Hypertension     Bp has been good.  Following with cardiology for a-fib.   .    HPI  Currently in rehab at [facility].   Getting PT/OT 3 days per week.   Right arm weakness/ right hemiparesis   Has been referred to neurologist Dr. Sherwood, but he has not yet scheduled appt.   Feels that right hand is getting a little stronger.     Hypertension:  Home blood pressure monitoring: No.  He is not adherent to a low sodium diet. Patient denies chest pain, headache, peripheral edema, and palpitations.  Antihypertensive medication side effects: no medication side effects noted.      On metoprolol succinate 25 mg daily and lisinopril 2.5 mg daily     Hyperlipidemia:  No new myalgias or GI upset on atorvastatin (Lipitor).       PAF- on eliquis 5 mg BID and metoprolol succinate 25 mg daily   Cardiologist Dr. Stout   Reports that chronic shortness of breath has not improved. He has not yet scheduled f/u with pulmonologist     CAD- on atorvastatin 40 mg daily. Not on ASA therapy since taking eliquis   Cardiologist Dr. Stout     Lab Results   Component Value Date    LABA1C 6.0 12/07/2024    LABA1C 5.4 09/27/2021     Lab Results   Component Value Date    CREATININE 1.0 01/14/2025     Lab Results   Component Value Date    ALT 36 01/14/2025    AST 36 01/14/2025     Lab Results   Component Value Date    CHOL 210 (H) 12/07/2024    TRIG 151 (H) 12/07/2024    HDL 87 (H) 12/07/2024    LDL 93 12/07/2024        Thyroid goiter and thyroid nodule. He reports that he has never had a biopsy.   TSH normal 12/7/24   Has been referred to have nodule biopsied, which he has never completed. Has been referred to endo but he never scheduled appt.      Patient Active Problem List   Diagnosis    Thyroid goiter    Chest pain    SOB (shortness of breath)    Essential hypertension    Elevated liver enzymes    Alcohol abuse    Folate deficiency    Coronary artery disease

## 2025-02-17 DIAGNOSIS — R79.89 HIGH SERUM FERRITIN: ICD-10-CM

## 2025-02-17 DIAGNOSIS — R79.89 HIGH SERUM FERRITIN: Primary | ICD-10-CM

## 2025-02-22 LAB
HFE GENE MUT ANL BLD/T: NORMAL
HFE P.C282Y BLD/T QL: NEGATIVE
HFE P.H63D BLD/T QL: NORMAL
HFE P.S65C BLD/T QL: NEGATIVE
SPECIMEN SOURCE: NORMAL

## 2025-02-24 DIAGNOSIS — R79.89 ELEVATED FERRITIN LEVEL: ICD-10-CM

## 2025-02-24 DIAGNOSIS — Z14.8 HEMOCHROMATOSIS CARRIER: Primary | ICD-10-CM

## 2025-03-28 ENCOUNTER — TRANSCRIBE ORDERS (OUTPATIENT)
Dept: ADMINISTRATIVE | Age: 65
End: 2025-03-28

## 2025-03-28 DIAGNOSIS — D89.89 AUTOIMMUNE DISORDER: ICD-10-CM

## 2025-03-28 DIAGNOSIS — G96.89 CNS MASS: ICD-10-CM

## 2025-03-28 DIAGNOSIS — Z72.0 TOBACCO ABUSE: ICD-10-CM

## 2025-03-28 DIAGNOSIS — G37.9 DEMYELINATING DISEASE (HCC): Primary | ICD-10-CM

## 2025-03-28 DIAGNOSIS — I15.9 SECONDARY HYPERTENSION: ICD-10-CM

## 2025-03-28 DIAGNOSIS — E78.5 HYPERLIPIDEMIA, UNSPECIFIED HYPERLIPIDEMIA TYPE: ICD-10-CM

## 2025-03-28 DIAGNOSIS — I63.9 ISCHEMIC STROKE (HCC): ICD-10-CM

## 2025-05-20 ENCOUNTER — PATIENT MESSAGE (OUTPATIENT)
Dept: PULMONOLOGY | Age: 65
End: 2025-05-20